# Patient Record
Sex: FEMALE | Race: BLACK OR AFRICAN AMERICAN | Employment: FULL TIME | URBAN - METROPOLITAN AREA
[De-identification: names, ages, dates, MRNs, and addresses within clinical notes are randomized per-mention and may not be internally consistent; named-entity substitution may affect disease eponyms.]

---

## 2019-03-03 ENCOUNTER — HOSPITAL ENCOUNTER (INPATIENT)
Age: 29
LOS: 14 days | Discharge: HOME | DRG: 500 | End: 2019-03-18
Attending: EMERGENCY MEDICINE | Admitting: HOSPITALIST
Payer: SELF-PAY

## 2019-03-03 DIAGNOSIS — G72.9 MYOPATHY: ICD-10-CM

## 2019-03-03 DIAGNOSIS — M62.82 NON-TRAUMATIC RHABDOMYOLYSIS: Primary | ICD-10-CM

## 2019-03-03 LAB
BASOPHILS # BLD: 0 K/UL (ref 0–0.1)
BASOPHILS NFR BLD: 0 % (ref 0–1)
DIFFERENTIAL METHOD BLD: ABNORMAL
EOSINOPHIL # BLD: 0 K/UL (ref 0–0.4)
EOSINOPHIL NFR BLD: 0 % (ref 0–7)
ERYTHROCYTE [DISTWIDTH] IN BLOOD BY AUTOMATED COUNT: 13.1 % (ref 11.5–14.5)
HCT VFR BLD AUTO: 41.4 % (ref 35–47)
HGB BLD-MCNC: 13.8 G/DL (ref 11.5–16)
IMM GRANULOCYTES # BLD AUTO: 0 K/UL (ref 0–0.04)
IMM GRANULOCYTES NFR BLD AUTO: 0 % (ref 0–0.5)
LYMPHOCYTES # BLD: 0.7 K/UL (ref 0.8–3.5)
LYMPHOCYTES NFR BLD: 5 % (ref 12–49)
MCH RBC QN AUTO: 28.3 PG (ref 26–34)
MCHC RBC AUTO-ENTMCNC: 33.3 G/DL (ref 30–36.5)
MCV RBC AUTO: 84.8 FL (ref 80–99)
MONOCYTES # BLD: 0.8 K/UL (ref 0–1)
MONOCYTES NFR BLD: 6 % (ref 5–13)
NEUTS SEG # BLD: 12.1 K/UL (ref 1.8–8)
NEUTS SEG NFR BLD: 89 % (ref 32–75)
NRBC # BLD: 0 K/UL (ref 0–0.01)
NRBC BLD-RTO: 0 PER 100 WBC
PLATELET # BLD AUTO: 256 K/UL (ref 150–400)
PMV BLD AUTO: 9.1 FL (ref 8.9–12.9)
RBC # BLD AUTO: 4.88 M/UL (ref 3.8–5.2)
RBC MORPH BLD: ABNORMAL
WBC # BLD AUTO: 13.6 K/UL (ref 3.6–11)

## 2019-03-03 PROCEDURE — 82550 ASSAY OF CK (CPK): CPT

## 2019-03-03 PROCEDURE — 74011250636 HC RX REV CODE- 250/636: Performed by: EMERGENCY MEDICINE

## 2019-03-03 PROCEDURE — 96361 HYDRATE IV INFUSION ADD-ON: CPT

## 2019-03-03 PROCEDURE — 84100 ASSAY OF PHOSPHORUS: CPT

## 2019-03-03 PROCEDURE — 36415 COLL VENOUS BLD VENIPUNCTURE: CPT

## 2019-03-03 PROCEDURE — 99285 EMERGENCY DEPT VISIT HI MDM: CPT

## 2019-03-03 PROCEDURE — 96360 HYDRATION IV INFUSION INIT: CPT

## 2019-03-03 PROCEDURE — 83735 ASSAY OF MAGNESIUM: CPT

## 2019-03-03 PROCEDURE — 80053 COMPREHEN METABOLIC PANEL: CPT

## 2019-03-03 PROCEDURE — 85025 COMPLETE CBC W/AUTO DIFF WBC: CPT

## 2019-03-03 RX ADMIN — SODIUM CHLORIDE 1000 ML: 900 INJECTION, SOLUTION INTRAVENOUS at 22:58

## 2019-03-03 RX ADMIN — SODIUM CHLORIDE 1000 ML: 900 INJECTION, SOLUTION INTRAVENOUS at 23:41

## 2019-03-04 PROBLEM — E87.6 HYPOKALEMIA: Status: ACTIVE | Noted: 2019-03-04

## 2019-03-04 LAB
ALBUMIN SERPL-MCNC: 3.9 G/DL (ref 3.5–5)
ALBUMIN/GLOB SERPL: 0.9 {RATIO} (ref 1.1–2.2)
ALP SERPL-CCNC: 72 U/L (ref 45–117)
ALT SERPL-CCNC: 111 U/L (ref 12–78)
AMORPH CRY URNS QL MICRO: ABNORMAL
AMPHET UR QL SCN: NEGATIVE
ANION GAP SERPL CALC-SCNC: 13 MMOL/L (ref 5–15)
ANION GAP SERPL CALC-SCNC: 14 MMOL/L (ref 5–15)
ANION GAP SERPL CALC-SCNC: 20 MMOL/L (ref 5–15)
APPEARANCE UR: ABNORMAL
ARTERIAL PATENCY WRIST A: ABNORMAL
AST SERPL-CCNC: 428 U/L (ref 15–37)
BACTERIA URNS QL MICRO: ABNORMAL /HPF
BARBITURATES UR QL SCN: NEGATIVE
BASE DEFICIT BLD-SCNC: 9 MMOL/L
BDY SITE: ABNORMAL
BENZODIAZ UR QL: NEGATIVE
BILIRUB SERPL-MCNC: 0.6 MG/DL (ref 0.2–1)
BILIRUB UR QL CFM: NEGATIVE
BUN SERPL-MCNC: 14 MG/DL (ref 6–20)
BUN SERPL-MCNC: 15 MG/DL (ref 6–20)
BUN SERPL-MCNC: 20 MG/DL (ref 6–20)
BUN/CREAT SERPL: 12 (ref 12–20)
BUN/CREAT SERPL: 14 (ref 12–20)
BUN/CREAT SERPL: 17 (ref 12–20)
CA-I BLD-SCNC: 0.8 MMOL/L (ref 1.12–1.32)
CALCIUM SERPL-MCNC: 6.3 MG/DL (ref 8.5–10.1)
CALCIUM SERPL-MCNC: 6.6 MG/DL (ref 8.5–10.1)
CALCIUM SERPL-MCNC: 8.5 MG/DL (ref 8.5–10.1)
CANNABINOIDS UR QL SCN: NEGATIVE
CHLORIDE SERPL-SCNC: 100 MMOL/L (ref 97–108)
CHLORIDE SERPL-SCNC: 107 MMOL/L (ref 97–108)
CHLORIDE SERPL-SCNC: 110 MMOL/L (ref 97–108)
CK SERPL-CCNC: ABNORMAL U/L (ref 26–192)
CO2 SERPL-SCNC: 10 MMOL/L (ref 21–32)
CO2 SERPL-SCNC: 16 MMOL/L (ref 21–32)
CO2 SERPL-SCNC: 21 MMOL/L (ref 21–32)
COCAINE UR QL SCN: NEGATIVE
COLOR UR: ABNORMAL
COMMENT, HOLDF: NORMAL
CREAT SERPL-MCNC: 1.01 MG/DL (ref 0.55–1.02)
CREAT SERPL-MCNC: 1.15 MG/DL (ref 0.55–1.02)
CREAT SERPL-MCNC: 1.23 MG/DL (ref 0.55–1.02)
DRUG SCRN COMMENT,DRGCM: NORMAL
EPITH CASTS URNS QL MICRO: ABNORMAL /LPF
GAS FLOW.O2 O2 DELIVERY SYS: ABNORMAL L/MIN
GLOBULIN SER CALC-MCNC: 4.4 G/DL (ref 2–4)
GLUCOSE SERPL-MCNC: 104 MG/DL (ref 65–100)
GLUCOSE SERPL-MCNC: 106 MG/DL (ref 65–100)
GLUCOSE SERPL-MCNC: 88 MG/DL (ref 65–100)
GLUCOSE UR STRIP.AUTO-MCNC: 100 MG/DL
HCG UR QL: NEGATIVE
HCO3 BLD-SCNC: 17.9 MMOL/L (ref 22–26)
HGB UR QL STRIP: ABNORMAL
KETONES UR QL STRIP.AUTO: 15 MG/DL
LEUKOCYTE ESTERASE UR QL STRIP.AUTO: ABNORMAL
MAGNESIUM SERPL-MCNC: 2.2 MG/DL (ref 1.6–2.4)
METHADONE UR QL: NEGATIVE
MUCOUS THREADS URNS QL MICRO: ABNORMAL /LPF
NITRITE UR QL STRIP.AUTO: POSITIVE
OPIATES UR QL: NEGATIVE
PCO2 BLD: 37.5 MMHG (ref 35–45)
PCP UR QL: NEGATIVE
PH BLD: 7.29 [PH] (ref 7.35–7.45)
PH UR STRIP: 7 [PH] (ref 5–8)
PHOSPHATE SERPL-MCNC: 4.4 MG/DL (ref 2.6–4.7)
PO2 BLD: 62 MMHG (ref 80–100)
POTASSIUM SERPL-SCNC: 2.7 MMOL/L (ref 3.5–5.1)
POTASSIUM SERPL-SCNC: 2.8 MMOL/L (ref 3.5–5.1)
POTASSIUM SERPL-SCNC: 3.3 MMOL/L (ref 3.5–5.1)
POTASSIUM SERPL-SCNC: 3.9 MMOL/L (ref 3.5–5.1)
PROT SERPL-MCNC: 8.3 G/DL (ref 6.4–8.2)
PROT UR STRIP-MCNC: >300 MG/DL
RBC #/AREA URNS HPF: ABNORMAL /HPF (ref 0–5)
SAMPLES BEING HELD,HOLD: NORMAL
SAO2 % BLD: 88 % (ref 92–97)
SODIUM SERPL-SCNC: 134 MMOL/L (ref 136–145)
SODIUM SERPL-SCNC: 137 MMOL/L (ref 136–145)
SODIUM SERPL-SCNC: 140 MMOL/L (ref 136–145)
SP GR UR REFRACTOMETRY: 1.02 (ref 1–1.03)
SPECIMEN TYPE: ABNORMAL
TSH SERPL DL<=0.05 MIU/L-ACNC: 0.3 UIU/ML (ref 0.36–3.74)
UROBILINOGEN UR QL STRIP.AUTO: 0.2 EU/DL (ref 0.2–1)
WBC URNS QL MICRO: ABNORMAL /HPF (ref 0–4)
YEAST BUDDING URNS QL: PRESENT

## 2019-03-04 PROCEDURE — 74011250636 HC RX REV CODE- 250/636: Performed by: INTERNAL MEDICINE

## 2019-03-04 PROCEDURE — 82550 ASSAY OF CK (CPK): CPT

## 2019-03-04 PROCEDURE — 81001 URINALYSIS AUTO W/SCOPE: CPT

## 2019-03-04 PROCEDURE — 74011000258 HC RX REV CODE- 258: Performed by: HOSPITALIST

## 2019-03-04 PROCEDURE — 80048 BASIC METABOLIC PNL TOTAL CA: CPT

## 2019-03-04 PROCEDURE — 74011250636 HC RX REV CODE- 250/636: Performed by: HOSPITALIST

## 2019-03-04 PROCEDURE — 84443 ASSAY THYROID STIM HORMONE: CPT

## 2019-03-04 PROCEDURE — 65270000029 HC RM PRIVATE

## 2019-03-04 PROCEDURE — 80307 DRUG TEST PRSMV CHEM ANLYZR: CPT

## 2019-03-04 PROCEDURE — 51798 US URINE CAPACITY MEASURE: CPT

## 2019-03-04 PROCEDURE — 96361 HYDRATE IV INFUSION ADD-ON: CPT

## 2019-03-04 PROCEDURE — 81025 URINE PREGNANCY TEST: CPT

## 2019-03-04 PROCEDURE — 77030034850

## 2019-03-04 PROCEDURE — 74011250637 HC RX REV CODE- 250/637: Performed by: EMERGENCY MEDICINE

## 2019-03-04 PROCEDURE — 84132 ASSAY OF SERUM POTASSIUM: CPT

## 2019-03-04 PROCEDURE — 74011000258 HC RX REV CODE- 258: Performed by: NURSE PRACTITIONER

## 2019-03-04 PROCEDURE — 74011250636 HC RX REV CODE- 250/636: Performed by: NURSE PRACTITIONER

## 2019-03-04 PROCEDURE — 74011000250 HC RX REV CODE- 250: Performed by: HOSPITALIST

## 2019-03-04 PROCEDURE — 82803 BLOOD GASES ANY COMBINATION: CPT

## 2019-03-04 PROCEDURE — 36415 COLL VENOUS BLD VENIPUNCTURE: CPT

## 2019-03-04 RX ORDER — SODIUM CHLORIDE 9 MG/ML
100 INJECTION, SOLUTION INTRAVENOUS CONTINUOUS
Status: DISCONTINUED | OUTPATIENT
Start: 2019-03-04 | End: 2019-03-05

## 2019-03-04 RX ORDER — SODIUM CHLORIDE 0.9 % (FLUSH) 0.9 %
5-40 SYRINGE (ML) INJECTION EVERY 8 HOURS
Status: DISCONTINUED | OUTPATIENT
Start: 2019-03-04 | End: 2019-03-18 | Stop reason: HOSPADM

## 2019-03-04 RX ORDER — ACETAMINOPHEN 500 MG
500 TABLET ORAL
Status: DISCONTINUED | OUTPATIENT
Start: 2019-03-04 | End: 2019-03-18 | Stop reason: HOSPADM

## 2019-03-04 RX ORDER — POTASSIUM CHLORIDE 1.5 G/1.77G
40 POWDER, FOR SOLUTION ORAL
Status: COMPLETED | OUTPATIENT
Start: 2019-03-04 | End: 2019-03-04

## 2019-03-04 RX ORDER — ENOXAPARIN SODIUM 100 MG/ML
40 INJECTION SUBCUTANEOUS EVERY 24 HOURS
Status: DISCONTINUED | OUTPATIENT
Start: 2019-03-04 | End: 2019-03-10

## 2019-03-04 RX ORDER — POTASSIUM CHLORIDE 7.45 MG/ML
10 INJECTION INTRAVENOUS
Status: COMPLETED | OUTPATIENT
Start: 2019-03-04 | End: 2019-03-04

## 2019-03-04 RX ORDER — OXYCODONE HYDROCHLORIDE 5 MG/1
5 TABLET ORAL
Status: DISCONTINUED | OUTPATIENT
Start: 2019-03-04 | End: 2019-03-13

## 2019-03-04 RX ORDER — SODIUM CHLORIDE 0.9 % (FLUSH) 0.9 %
5-40 SYRINGE (ML) INJECTION AS NEEDED
Status: DISCONTINUED | OUTPATIENT
Start: 2019-03-04 | End: 2019-03-18 | Stop reason: HOSPADM

## 2019-03-04 RX ORDER — ONDANSETRON 2 MG/ML
4 INJECTION INTRAMUSCULAR; INTRAVENOUS ONCE
Status: COMPLETED | OUTPATIENT
Start: 2019-03-04 | End: 2019-03-04

## 2019-03-04 RX ORDER — MORPHINE SULFATE 2 MG/ML
2 INJECTION, SOLUTION INTRAMUSCULAR; INTRAVENOUS
Status: DISCONTINUED | OUTPATIENT
Start: 2019-03-04 | End: 2019-03-05

## 2019-03-04 RX ORDER — ONDANSETRON 2 MG/ML
4 INJECTION INTRAMUSCULAR; INTRAVENOUS
Status: DISCONTINUED | OUTPATIENT
Start: 2019-03-04 | End: 2019-03-18 | Stop reason: HOSPADM

## 2019-03-04 RX ORDER — POTASSIUM CHLORIDE 1.5 G/1.77G
40 POWDER, FOR SOLUTION ORAL
Status: DISCONTINUED | OUTPATIENT
Start: 2019-03-04 | End: 2019-03-04 | Stop reason: SDUPTHER

## 2019-03-04 RX ORDER — POTASSIUM CHLORIDE 750 MG/1
40 TABLET, FILM COATED, EXTENDED RELEASE ORAL
Status: DISCONTINUED | OUTPATIENT
Start: 2019-03-04 | End: 2019-03-04 | Stop reason: ALTCHOICE

## 2019-03-04 RX ADMIN — ONDANSETRON HYDROCHLORIDE 4 MG: 2 INJECTION INTRAMUSCULAR; INTRAVENOUS at 20:41

## 2019-03-04 RX ADMIN — MORPHINE SULFATE 2 MG: 2 INJECTION, SOLUTION INTRAMUSCULAR; INTRAVENOUS at 09:18

## 2019-03-04 RX ADMIN — POTASSIUM CHLORIDE 10 MEQ: 10 INJECTION, SOLUTION INTRAVENOUS at 02:31

## 2019-03-04 RX ADMIN — Medication 10 ML: at 06:33

## 2019-03-04 RX ADMIN — POTASSIUM CHLORIDE 10 MEQ: 10 INJECTION, SOLUTION INTRAVENOUS at 06:33

## 2019-03-04 RX ADMIN — MORPHINE SULFATE 2 MG: 2 INJECTION, SOLUTION INTRAMUSCULAR; INTRAVENOUS at 14:01

## 2019-03-04 RX ADMIN — CALCIUM GLUCONATE 2 G: 98 INJECTION, SOLUTION INTRAVENOUS at 16:25

## 2019-03-04 RX ADMIN — ENOXAPARIN SODIUM 40 MG: 100 INJECTION SUBCUTANEOUS at 10:00

## 2019-03-04 RX ADMIN — MORPHINE SULFATE 2 MG: 2 INJECTION, SOLUTION INTRAMUSCULAR; INTRAVENOUS at 02:31

## 2019-03-04 RX ADMIN — CALCIUM GLUCONATE 2 G: 98 INJECTION, SOLUTION INTRAVENOUS at 19:47

## 2019-03-04 RX ADMIN — SODIUM CHLORIDE 200 ML/HR: 900 INJECTION, SOLUTION INTRAVENOUS at 02:31

## 2019-03-04 RX ADMIN — SODIUM BICARBONATE: 84 INJECTION, SOLUTION INTRAVENOUS at 21:52

## 2019-03-04 RX ADMIN — ONDANSETRON 4 MG: 2 INJECTION INTRAMUSCULAR; INTRAVENOUS at 14:01

## 2019-03-04 RX ADMIN — POTASSIUM CHLORIDE 10 MEQ: 10 INJECTION, SOLUTION INTRAVENOUS at 03:44

## 2019-03-04 RX ADMIN — Medication 10 ML: at 14:01

## 2019-03-04 RX ADMIN — MORPHINE SULFATE 2 MG: 2 INJECTION, SOLUTION INTRAMUSCULAR; INTRAVENOUS at 22:31

## 2019-03-04 RX ADMIN — POTASSIUM CHLORIDE 10 MEQ: 10 INJECTION, SOLUTION INTRAVENOUS at 05:20

## 2019-03-04 RX ADMIN — POTASSIUM CHLORIDE 40 MEQ: 1.5 POWDER, FOR SOLUTION ORAL at 01:09

## 2019-03-04 RX ADMIN — MORPHINE SULFATE 2 MG: 2 INJECTION, SOLUTION INTRAMUSCULAR; INTRAVENOUS at 18:23

## 2019-03-04 NOTE — PROGRESS NOTES
Primary Nurse Lenny Eason RN and Alejandro Shin RN performed a dual skin assessment on this patient No impairment noted  Jacky score is 17      0920:  Patient arrived on the unit. 4 assist from stretcher to bed. Assessment and orientation to unit  Patient requested IV morphine - administered to the patient. 0945:  Patient complained of purewick \"not working\". Had patient explain further what she meant. She stated that \"it's just not working. They always give me a catheter every time I come in for this. Why don't they do it anymore? \"  Explained to her that it a catheter increases her risk for a UTI and we would like to avoid that. Educated her on the straight cath protocol. Patient was bladder scanned for 464 and straight cathed for 500.   1000:  Upon reassessment of pain, patient is snoring in bed. [place] the patient who stated her pain is now 7/10, decreased from 8/10  Call received from lab regarding patient's CPK level of 132,780. MD notified. No new orders given. 1115: Mother at bedside. Patient declined turning  1230:  Patient vomited, cleaned by PCT, turned on right side  1400:  Administered PRN medications per request.  Patient stated that her bladd has been feeling \"full\" for 2 hours now, bladder scanned patient for 275ml. Offered bedpan. 1515:  Patient began complaining that we need to straight cath her and that she was feeling \"really uncomfortable\". Patient straight cathed for 400.   Repositioned to left side

## 2019-03-04 NOTE — ROUTINE PROCESS
TRANSFER - OUT REPORT:    Verbal report given to LUIS Ambrocio(name) on [address]  being transferred to 546(unit) for routine progression of care       Report consisted of patients Situation, Background, Assessment and   Recommendations(SBAR). Information from the following report(s) SBAR, ED Summary, [facility] and Recent Results was reviewed with the receiving nurse. Lines:   Peripheral IV 03/03/19 Left Antecubital (Active)   Site Assessment Clean, dry, & intact 3/3/2019 10:59 PM   Phlebitis Assessment 0 3/3/2019 10:59 PM   Infiltration Assessment 0 3/3/2019 10:59 PM   Dressing Status Clean, dry, & intact 3/3/2019 10:59 PM   Dressing Type Transparent 3/3/2019 10:59 PM   Hub Color/Line Status Green; Infusing;Flushed;Patent 3/3/2019 10:59 PM   Action Taken Blood drawn 3/3/2019 10:59 PM        Opportunity for questions and clarification was provided.       Patient transported with:    Jennifer Ayoub RN

## 2019-03-04 NOTE — PROGRESS NOTES
TRANSFER - IN REPORT:    Verbal report received from ED RN (name) on [address]  being received from ED (unit) for routine progression of care      Report consisted of patients Situation, Background, Assessment and   Recommendations(SBAR). Information from the following report(s) SBAR, Kardex, ED Summary and MAR was reviewed with the receiving nurse. Opportunity for questions and clarification was provided. Assessment completed upon patients arrival to unit and care assumed.

## 2019-03-04 NOTE — PROGRESS NOTES
Hospitalist Progress Note  Melissa Arredondo NP  Answering service: [phone] from in house phone  Cell: [phone]   Date of Service:  3/4/2019  NAME:  Kishore Srinivasan  :  1990  MRN:  [id number]    Admission Summary:   Pt presented to the ED with generalized body aches and soreness with any movement. She has a hx of rhabdomyolysis x 2 other times in her life. Reported a mild cold but denied ay strenuous activity or heavy exertion. She hasn't started any new prescription meds, but started taking a weight loss pill called \"thermofight-x\" ~ 1 week PTA. She also reported she noticed her urine had turned a dark brown color. Due to her hx of rhabdo, she noted these symptoms and came to the ED with expectation of the diagnosis. Interval history / Subjective:   Pt is in bed, reports she feels aching and muscle pain all over. In interview, she says she feels weak but no numbness, tingling or paresthesias. Does not want to move due to her pain. Assessment & Plan:     Rhabdomyolysis:  - etiology is uncertain. - significant hypokalemia which could precipitate rhabdo. She also started taking a weight-loss pill called Thermofight-X which contains multiple ingredients including chromium and a \"proprietery blend. \"   - UDS negative  - CK 79,380 on admission and now bumped up to 132,780  - continue high rate of IVNS  - replete potassium, monitor other lytes  - trend CK  - pain control with morphine  - Check TSH, JAMARCUS  - will need EMG outpatient, briefly discussed with neurologist  - pt has declined an in-patient muscle biopsy     Transaminitis:   - elevated AST likely due to muscle breakdown, ALT elevation probably due to mild liver damage from rhabdo  - monitor LFTs with fluids on board     Hypokalemia: replete and recheck labs this afternoon    Hypocalcemia: check an ionized calcium    Code status:Full  DVT prophylaxis: Lovenox  Care Plan discussed with: patient, attending MD  Disposition: home when able     Hospital Problems  Date Reviewed: 5/12/2016          Codes Class Noted POA    Hypokalemia ICD-10-CM: E87.6  ICD-9-CM: 276.8  3/4/2019 Yes        * (Principal) Rhabdomyolysis ICD-10-CM: I67.10  ICD-9-CM: 728.88  5/4/2016 Yes            Review of Systems:   Denies HA. No chest pain or pressure. No respiratory or GI complaints. +++generalized muscle soreness and discomfort to all muscles. Vital Signs:    Last 24hrs VS reviewed since prior progress note. Most recent are:  Visit Vitals  /72 (BP 1 Location: Right arm, BP Patient Position: Post activity)   Pulse 76   Temp 98.6 °F (37 °C)   Resp 16   Ht 5' 3\" (1.6 m)   Wt 109 kg (240 lb 4.8 oz)   SpO2 97%   BMI 42.57 kg/m²       Intake/Output Summary (Last 24 hours) at 3/4/2019 1312  Last data filed at 3/4/2019 7832  Gross per 24 hour   Intake --   Output 500 ml   Net -500 ml      Physical Examination:         Constitutional:  Cooperative, pleasant. Looks uncomfortable    ENT:  Oral MM moist, oropharynx benign. Resp:  CTA bilaterally. No wheezing. No accessory muscle use and on RA. CV:  Regular rhythm, normal rate. No murmurs. GI:  Obese. Soft, non distended, non tender. Normoactive bowel sounds. Musculoskeletal:  No edema, warm, 2+ pulses throughout. Neurologic:  Moves all extremities but slowly and with pain. AAOx3. Sensation is intact. Data Review:   Labs Reviewed. Labs:     Recent Labs     03/03/19  2300   WBC 13.6*   HGB 13.8   HCT 41.4        Recent Labs     03/04/19  0741 03/03/19  2300    134*   K 2.8* 2.7*    100   CO2 16* 21   BUN 14 15   CREA 1.01 1.23*   * 106*   CA 6.6* 8.5   MG  --  2.2   PHOS  --  4.4     Recent Labs     03/03/19  2300   SGOT 428*   *   AP 72   TBILI 0.6   TP 8.3*   ALB 3.9   GLOB 4.4*     No results for input(s): INR, PTP, APTT in the last 72 hours.     No lab exists for component: INREXT   No results for input(s): FE, TIBC, PSAT, FERR in the last 72 hours. No results found for: FOL, RBCF   No results for input(s): PH, PCO2, PO2 in the last 72 hours.   Recent Labs     03/04/19  0741 03/03/19  2300   ,780* 79,380*     No results found for: CHOL, CHOLX, CHLST, CHOLV, HDL, LDL, LDLC, DLDLP, TGLX, TRIGL, TRIGP, CHHD, CHHDX  Lab Results   Component Value Date/Time    Glucose (POC) 92 04/28/2013 11:56 AM     Lab Results   Component Value Date/Time    Color RED 03/04/2019 12:14 AM    Appearance BLOODY (A) 03/04/2019 12:14 AM    Specific gravity 1.025 03/04/2019 12:14 AM    Specific gravity 1.025 05/26/2016 07:42 PM    pH (UA) 7.0 03/04/2019 12:14 AM    Protein >300 (A) 03/04/2019 12:14 AM    Glucose 100 (A) 03/04/2019 12:14 AM    Ketone 15 (A) 03/04/2019 12:14 AM    Bilirubin NEGATIVE  05/26/2016 07:42 PM    Urobilinogen 0.2 03/04/2019 12:14 AM    Nitrites POSITIVE (A) 03/04/2019 12:14 AM    Leukocyte Esterase MODERATE (A) 03/04/2019 12:14 AM    Epithelial cells FEW 03/04/2019 12:14 AM    Bacteria 1+ (A) 03/04/2019 12:14 AM    WBC 5-10 03/04/2019 12:14 AM    RBC  03/04/2019 12:14 AM     Medications Reviewed:     Current Facility-Administered Medications   Medication Dose Route Frequency    sodium chloride (NS) flush 5-40 mL  5-40 mL IntraVENous Q8H    sodium chloride (NS) flush 5-40 mL  5-40 mL IntraVENous PRN    morphine injection 2 mg  2 mg IntraVENous Q4H PRN    enoxaparin (LOVENOX) injection 40 mg  40 mg SubCUTAneous Q24H    0.9% sodium chloride infusion  200 mL/hr IntraVENous CONTINUOUS    oxyCODONE IR (ROXICODONE) tablet 5 mg  5 mg Oral Q4H PRN    acetaminophen (TYLENOL) tablet 500 mg  500 mg Oral Q6H PRN    ondansetron (ZOFRAN) injection 4 mg  4 mg IntraVENous ONCE   ______________________________________________________________________  EXPECTED LENGTH OF STAY: 3d 4h  ACTUAL LENGTH OF STAY:          0               Conchis Flynn NP

## 2019-03-04 NOTE — PROGRESS NOTES
Care Management Interventions  PCP Verified by CM: No(does not have a PCP, given a list of free clinics)  Palliative Care Criteria Met (RRAT>21 & CHF Dx)?: No  MyChart Signup: No  Discharge Durable Medical Equipment: No  Health Maintenance Reviewed: Yes  Physical Therapy Consult: No  Occupational Therapy Consult: No  Speech Therapy Consult: No  Current Support Network: Own Home  Confirm Follow Up Transport: Family  Plan discussed with Pt/Family/Caregiver: Yes  Freedom of Choice Offered: Yes  [place] Resource Information Provided?: No  Discharge Location  Discharge Placement: Home with family assistance    Reason for Admission:   Generalized body aches                   RRAT Score:     4                Plan for utilizing home health:    Most likely will not need home health                     Likelihood of Readmission:  low                         Transition of Care Plan:    Chart reviewed for transitions of care, met with patient at the bedside. Patient is alert and oriented but not up for conversation. She did confirm that she does not have insurance and does not have  PCP. Cm asked patient if she worked full time and if they offered insurance. She stated that they do, but it is too expensive. Cm will provide her with information on the [facility] in case she is interested in establishing a PCP there.   Dillon Bliss BSW, ACM

## 2019-03-04 NOTE — PROGRESS NOTES
[address] 34 March 5, 2019       RE: Rafael Hankins      To Whom It May Concern,    This is to certify that Rafael Hankins is currently hospitalized (as of 3/4/2018) and her , Lukas Bernal has been present at her bedside 3/4-present.      Sincerely,  Vianey Garcia, NP

## 2019-03-04 NOTE — CONSULTS
[id number] [facility] Surgery at [address] [facility] Consultation    Admit Date: 3/3/2019  Reason for Consultation: Muscle biopsy for familial rhabdomyolysis    HPI:  Stefan Qureshi is a 34 y.o. female whom we are asked to see in consultation by for muscle biopsy. She is currently admitted rhabdomyolysis. Symptoms began yesterday with a \"mild cold\" and she subsequently developed body aches. States this is the third time she has gotten rhabdomyolysis. She reports history of rhabdomyolysis in both her mother and father. She wants to follow-up in the office to have muscle biopsy done as an outpatient. Does not want procedure done this admission. Patient Active Problem List    Diagnosis Date Noted    Hypokalemia 03/04/2019    Traumatic rhabdomyolysis ([facility] [address] 05/12/2016    Rhabdomyolysis 05/04/2016     Past Medical History:   Diagnosis Date    Other ill-defined conditions(799.89)     rhabdo    Rhabdomyolysis     Traumatic rhabdomyolysis ([facility] [address] 5/12/2016      History reviewed. No pertinent surgical history. Social History     Tobacco Use    Smoking status: Current Some Day Smoker    Smokeless tobacco: Never Used   Substance Use Topics    Alcohol use: No     Comment: rare      Family History   Problem Relation Age of Onset    Diabetes Father     Diabetes Maternal Grandmother     Diabetes Maternal Grandfather       Prior to Admission medications    Medication Sig Start Date End Date Taking?  Authorizing Provider   OTHER Thermofight X, weight loss supplement   Yes Provider, Historical     Current Facility-Administered Medications   Medication Dose Route Frequency    sodium chloride (NS) flush 5-40 mL  5-40 mL IntraVENous Q8H    sodium chloride (NS) flush 5-40 mL  5-40 mL IntraVENous PRN    morphine injection 2 mg  2 mg IntraVENous Q4H PRN    enoxaparin (LOVENOX) injection 40 mg  40 mg SubCUTAneous Q24H    0.9% sodium chloride infusion  200 mL/hr IntraVENous CONTINUOUS    oxyCODONE IR (ROXICODONE) tablet 5 mg  5 mg Oral Q4H PRN    acetaminophen (TYLENOL) tablet 500 mg  500 mg Oral Q6H PRN     Allergies   Allergen Reactions    Compazine [Prochlorperazine Edisylate] Anaphylaxis     stroke    Midol [Acetaminophen-Pamabrom] Anaphylaxis     dont take again because of rabdo    Zofran [Ondansetron Hcl (Pf)] Nausea and Vomiting    Aspirin Other (comments)     Never take again because of rabdo    Compazine [Prochlorperazine Edisylate] Other (comments)     Stroke symptoms    Contrast Agent [Iodine] Other (comments)     Kidney problems    Ibuprofen Other (comments)     Kidney problem,rhabdo      Motrin [Ibuprofen] Nausea and Vomiting    Zofran [Ondansetron Hcl (Pf)] Nausea and Vomiting          Subjective:     Review of Systems:    A comprehensive review of systems was negative except for that written in the History of Present Illness. Objective:     Blood pressure 124/72, pulse 76, temperature 98.6 °F (37 °C), resp. rate 16, height 5' 3\" (1.6 m), weight 109 kg (240 lb 4.8 oz), SpO2 97 %. Temp (24hrs), Av °F (37.2 °C), Min:98.6 °F (37 °C), Max:100.1 °F (37.8 °C)      Recent Labs     19  2300   WBC 13.6*   HGB 13.8   HCT 41.4        Recent Labs     19  0741 19  2300    134*   K 2.8* 2.7*    100   CO2 16* 21   * 106*   BUN 14 15   CREA 1.01 1.23*   CA 6.6* 8.5   MG  --  2.2   PHOS  --  4.4   ALB  --  3.9   TBILI  --  0.6   SGOT  --  428*   ALT  --  111*     No results for input(s): AML, LPSE in the last 72 hours.       Intake/Output Summary (Last 24 hours) at 3/4/2019 1055  Last data filed at 3/4/2019 0952  Gross per 24 hour   Intake --   Output 500 ml   Net -500 ml     Date 19 0700 - 19 0659   Shift 8247-7962 1247-1075 2077-4196 24 Hour Total   INTAKE   Shift Total(mL/kg)       OUTPUT   Urine(mL/kg/hr) 500   500   Shift Total(mL/kg) 500(4.6)   500(4.6)   Weight (kg) 109 109 109 109     _____________________  Physical Exam: General:  Alert, cooperative, no distress, appears stated age. Eyes:   PERRL, EOMs intact. Sclera clear. Throat: Lips, mucosa, and tongue normal.   Neck: Supple, symmetrical, trachea midline. Lungs:   Clear to auscultation bilaterally. Heart:  Regular rate and rhythm. Abdomen:   Normal BS, obese, Soft, non-tender. No masses,  No organomegaly. Extremities: Extremities normal, atraumatic, no cyanosis or edema. Skin: Skin color, texture, turgor normal. No rashes or lesions. Assessment:   Principal Problem:    Rhabdomyolysis (5/4/2016)    Active Problems:    Hypokalemia (3/4/2019)            Plan:     Appointment scheduled for 1:20pm on Tuesday, March 26, [address]. Thank you for allowing us to participate in the care of this patient. Total time spent with patient: 13 minutes.     Signed By: Alka Munguia NP     March 4, 2019

## 2019-03-04 NOTE — PROGRESS NOTES
Admission Medication Reconciliation:    Information obtained from:  patient, chart review, insurance claim information    Comments/Recommendations: All medications/allergies have been reviewed and updated; the patient reports that she is not taking any prescribed medications, but is currently taking a weight loss supplement called Therofight X. Changes made to Prior to Admission (PTA) Medication List:   ?   Medications Added:   - weight loss supplement  ? Medications Changed:   - None   ? Medications Removed:   - None       Significant PMH/Disease States:   Past Medical History:   Diagnosis Date    Other ill-defined conditions(799.89)     rhabdo    Rhabdomyolysis     Traumatic rhabdomyolysis ([facility] [address] 5/12/2016       Chief Complaint for this Admission:    Chief Complaint   Patient presents with    Generalized Body Aches       Allergies:  Compazine [prochlorperazine edisylate]; Midol [acetaminophen-pamabrom]; Zofran [ondansetron hcl (pf)]; Aspirin; Compazine [prochlorperazine edisylate]; Contrast agent [iodine]; Ibuprofen; Motrin [ibuprofen]; and Zofran [ondansetron hcl (pf)]    Prior to Admission Medications:   Prior to Admission Medications   Prescriptions Last Dose Informant Patient Reported? Taking? OTHER   Yes Yes   Sig: Thermofight X, weight loss supplement      Facility-Administered Medications: None       Thank you for allowing pharmacy to participate in the coordination of this patient's care. If you have any other questions, please contact the medication reconciliation pharmacist at x [phone]. Leandro Moreira, Pharm. D., BCPS

## 2019-03-04 NOTE — ED PROVIDER NOTES
34 y.o. female with past medical history significant for Rhabdomyolysis who presents from home via private vehicle with chief complaint of generalized body aches. Pt reports onset earlier today of generalized body aches accompanied by chills, nausea, vomiting, sore throat, and cough. Pt also notes onset \"2 hours ago\" of dark urine. Pt reports history of rhabdomyolysis and states, \"I had it once when I was 22 and again in 2015\". Pt reports current symptoms \"feel like Rhabdo\". Pt reports she works at "[employer]", so it is likely she may have recent ill contacts. Pt denies any fever or bowel symptoms. There are no other acute medical concerns at this time. Old Chart Review:  Pt was last seen in ED on 5/26/2016 for muscle aches. Pt's CK level was WNL. Pt was given IV fluids, discharged, and recommended to follow up with PCP as needed. Pt was last admitted for rhabdomyolysis from 5/4/2016 to 5/8/2016. Pt was treated with IV fluids. Upon admission, pt's CK was approximately 91233. According to progress note from PCP, dated 5/12/16, pt has family history of rhabdomyolosis; both her mother and grandfather experienced 1 episode each of rhabdomyolysis. Social hx: Current smoker; No EtOH use    Note written by Marisol Wong, as dictated by Vivian Perdue MD 10:06 PM        The history is provided by the patient and medical records. No  was used. Past Medical History:   Diagnosis Date    Other ill-defined conditions(799.89)     rhabdo    Rhabdomyolysis     Traumatic rhabdom[facility]olysis ([facility] [address] 5/12/2016       No past surgical history on file.       Family History:   Problem Relation Age of Onset    Diabetes Father     Diabetes Maternal Grandmother     Diabetes Maternal Grandfather        Social History     Socioeconomic History    Marital status: SINGLE     Spouse name: Not on file    Number of children: Not on file    Years of education: Not on file    Highest education level: Not on file   Social Needs    Financial resource strain: Not on file    Food insecurity - worry: Not on file    Food insecurity - inability: Not on file   [employer] needs - medical: Not on file   [employer] needs - non-medical: Not on file   Occupational History    Not on file   Tobacco Use    Smoking status: Current Some Day Smoker   Substance and Sexual Activity    Alcohol use: No     Comment: rare    Drug use: No    Sexual activity: Yes     Partners: Male     Birth control/protection: None   Other Topics Concern    Not on file   Social History Narrative    ** Merged History Encounter **              ALLERGIES: Compazine [prochlorperazine edisylate]; Midol [acetaminophen-pamabrom]; Zofran [ondansetron hcl (pf)]; Aspirin; Compazine [prochlorperazine edisylate]; Contrast agent [iodine]; Ibuprofen; Motrin [ibuprofen]; and Zofran [ondansetron hcl (pf)]    Review of Systems   Constitutional: Positive for chills. Negative for fever. HENT: Positive for sore throat. Negative for rhinorrhea. Respiratory: Positive for cough. Negative for shortness of breath. Cardiovascular: Negative for chest pain. Gastrointestinal: Positive for nausea and vomiting. Negative for abdominal pain and diarrhea. Genitourinary: Negative for dysuria and urgency. Musculoskeletal: Positive for myalgias. Negative for arthralgias and back pain. Skin: Negative for rash. Neurological: Negative for dizziness, weakness and light-headedness. All other systems reviewed and are negative. Vitals:    03/03/19 2142 03/03/19 2159   BP:  141/82   Pulse: (!) 110 (!) 111   Resp: 18 18   Temp:  100.1 °F (37.8 °C)   SpO2: 99% 94%   Weight:  109 kg (240 lb 4.8 oz)            Physical Exam   Vital signs reviewed. Nursing notes reviewed.     Const:  No acute distress, well developed, well nourished  Head:  Atraumatic, normocephalic  Eyes:  PERRL, conjunctiva normal, no scleral icterus  Neck:  Supple, trachea midline  Cardiovascular:  Pt is tachycardic, no murmurs, no gallops, no rubs  Resp:  No resp distress, no increased work of breathing, no wheezes, no rhonchi, no rales,  Abd:  Soft, non-tender, non-distended, no rebound, no guarding, no CVA tenderness  :  Deferred  MSK:  No pedal edema, normal ROM  Neuro:  Alert and oriented x3, no cranial nerve defect  Skin:  Warm, dry, intact  Psych: normal mood and affect, behavior is normal, judgement and thought content is normal    Note written by Marisol Quezada, as dictated by Amando Mustafa MD 10:06 PM    MDM  Number of Diagnoses or Management Options  Non-traumatic rhabdomyolysis:      Amount and/or Complexity of Data Reviewed  Clinical lab tests: ordered and reviewed  Tests in the radiology section of CPT®: ordered and reviewed  Review and summarize past medical records: yes    Patient Progress  Patient progress: stable         Procedures  PROGRESS NOTE:  10:56 PM  Pt is refusing flu test.    Pt. Presents to the ER with rhabdo. Pt. Has had several episodes of rhabdo in the past.  No obvious precipitating events  I have started her on IVF. CK is pending, but I suspect that it will be high. Pt. Signed out to Dr. Marvel Fraga.

## 2019-03-04 NOTE — PROGRESS NOTES
Bedside shift change report given to Nisreen Schwarz (oncoming nurse) by Sudheer Roberts (offgoing nurse). Report included the following information SBAR, Kardex, ED Summary, Intake/Output and MAR.

## 2019-03-04 NOTE — H&P
HISTORY AND PHYSICAL      PCP: None  History source: the patient      CC: body aches      HPI: 34 y.o lady with a history of rhabdomyolysis who presents with generalized body aches. Symptoms began today. Her entire body is sore and hurts with any movement. She denies feeling ill recently. She denies ay strenuous activity or heavy exertion recently. She hasn't started any new prescription meds, but started taking a weight loss pill called \"thermofight-x. \" She denies fever, dyspnea, n/v. She noticed her urine had turned a dark brown color. PMH/PSH:  Past Medical History:   Diagnosis Date    Other ill-defined conditions(799.89)     rhabdo    Rhabdomyolysis     Traumatic rhabdomyolysis ([facility] [address] 5/12/2016     History reviewed. No pertinent surgical history. Home meds:   Prior to Admission medications    Not on File   Takes a supplement called Thermofight-X for the past week    Allergies: Allergies   Allergen Reactions    Compazine [Prochlorperazine Edisylate] Anaphylaxis     stroke    Midol [Acetaminophen-Pamabrom] Anaphylaxis     dont take again because of rabdo    Zofran [Ondansetron Hcl (Pf)] Nausea and Vomiting    Aspirin Other (comments)     Never take again because of rabdo    Compazine [Prochlorperazine Edisylate] Other (comments)     Stroke symptoms    Contrast Agent [Iodine] Other (comments)     Kidney problems    Ibuprofen Other (comments)     Kidney problem,rhabdo      Motrin [Ibuprofen] Nausea and Vomiting    Zofran [Ondansetron Hcl (Pf)] Nausea and Vomiting       FH:  Family History   Problem Relation Age of Onset    Diabetes Father     Diabetes Maternal Grandmother     Diabetes Maternal Grandfather        SH:  Social History     Tobacco Use    Smoking status: Current Some Day Smoker    Smokeless tobacco: Never Used   Substance Use Topics    Alcohol use: No     Comment: rare       ROS: A comprehensive review of systems was negative except for that written in the HPI.       PHYSICAL EXAM:  Visit Vitals  /82   Pulse (!) 111   Temp 100.1 °F (37.8 °C)   Resp 18   Ht 5' 3\" (1.6 m)   Wt 109 kg (240 lb 4.8 oz)   SpO2 94%   BMI 42.57 kg/m²       Gen: NAD, non-toxic  HEENT: anicteric sclerae, normal conjunctiva, oropharynx clear, MM moist  Neck: supple, trachea midline, no adenopathy  Heart: RRR, no MRG, no JVD, no peripheral edema  Lungs: CTA b/l, non-labored respirations  Abd: soft, NT, ND, BS+, no organomegaly  Extr: warm, diffusely tender  Skin: dry, no rash  Neuro: CN II-XII grossly intact, normal speech, moves all extremities  Psych: flat affect      Labs/Imaging:  Recent Results (from the past 24 hour(s))   CK    Collection Time: 03/03/19 11:00 PM   Result Value Ref Range    CK 79,380 (HH) 26 - 366 U/L   METABOLIC PANEL, COMPREHENSIVE    Collection Time: 03/03/19 11:00 PM   Result Value Ref Range    Sodium 134 (L) 136 - 145 mmol/L    Potassium 2.7 (LL) 3.5 - 5.1 mmol/L    Chloride 100 97 - 108 mmol/L    CO2 21 21 - 32 mmol/L    Anion gap 13 5 - 15 mmol/L    Glucose 106 (H) 65 - 100 mg/dL    BUN 15 6 - 20 MG/DL    Creatinine 1.23 (H) 0.55 - 1.02 MG/DL    BUN/Creatinine ratio 12 12 - 20      GFR est AA >60 >60 ml/min/1.73m2    GFR est non-AA 52 (L) >60 ml/min/1.73m2    Calcium 8.5 8.5 - 10.1 MG/DL    Bilirubin, total 0.6 0.2 - 1.0 MG/DL    ALT (SGPT) 111 (H) 12 - 78 U/L    AST (SGOT) 428 (H) 15 - 37 U/L    Alk.  phosphatase 72 45 - 117 U/L    Protein, total 8.3 (H) 6.4 - 8.2 g/dL    Albumin 3.9 3.5 - 5.0 g/dL    Globulin 4.4 (H) 2.0 - 4.0 g/dL    A-G Ratio 0.9 (L) 1.1 - 2.2     CBC WITH AUTOMATED DIFF    Collection Time: 03/03/19 11:00 PM   Result Value Ref Range    WBC 13.6 (H) 3.6 - 11.0 K/uL    RBC 4.88 3.80 - 5.20 M/uL    HGB 13.8 11.5 - 16.0 g/dL    HCT 41.4 35.0 - 47.0 %    MCV 84.8 80.0 - 99.0 FL    MCH 28.3 26.0 - 34.0 PG    MCHC 33.3 30.0 - 36.5 g/dL    RDW 13.1 11.5 - 14.5 %    PLATELET 052 991 - 574 K/uL    MPV 9.1 8.9 - 12.9 FL    NRBC 0.0 0  WBC    ABSOLUTE NRBC 0.00 0.00 - 0.01 K/uL    NEUTROPHILS 89 (H) 32 - 75 %    LYMPHOCYTES 5 (L) 12 - 49 %    MONOCYTES 6 5 - 13 %    EOSINOPHILS 0 0 - 7 %    BASOPHILS 0 0 - 1 %    IMMATURE GRANULOCYTES 0 0.0 - 0.5 %    ABS. NEUTROPHILS 12.1 (H) 1.8 - 8.0 K/UL    ABS. LYMPHOCYTES 0.7 (L) 0.8 - 3.5 K/UL    ABS. MONOCYTES 0.8 0.0 - 1.0 K/UL    ABS. EOSINOPHILS 0.0 0.0 - 0.4 K/UL    ABS. BASOPHILS 0.0 0.0 - 0.1 K/UL    ABS. IMM. GRANS. 0.0 0.00 - 0.04 K/UL    DF SMEAR SCANNED      RBC COMMENTS OVALOCYTES  PRESENT       SAMPLES BEING HELD    Collection Time: 03/03/19 11:00 PM   Result Value Ref Range    SAMPLES BEING HELD 1RED,1BLUE, HOLD     COMMENT        Add-on orders for these samples will be processed based on acceptable specimen integrity and analyte stability, which may vary by analyte.    MAGNESIUM    Collection Time: 03/03/19 11:00 PM   Result Value Ref Range    Magnesium 2.2 1.6 - 2.4 mg/dL   URINALYSIS W/MICROSCOPIC    Collection Time: 03/04/19 12:14 AM   Result Value Ref Range    Color RED      Appearance BLOODY (A) CLEAR      Specific gravity 1.025 1.003 - 1.030      pH (UA) 7.0 5.0 - 8.0      Protein >300 (A) NEG mg/dL    Glucose 100 (A) NEG mg/dL    Ketone 15 (A) NEG mg/dL    Blood LARGE (A) NEG      Urobilinogen 0.2 0.2 - 1.0 EU/dL    Nitrites POSITIVE (A) NEG      Leukocyte Esterase MODERATE (A) NEG      Bilirubin UA, confirm NEGATIVE  NEG      WBC 5-10 0 - 4 /hpf    RBC  0 - 5 /hpf    Epithelial cells FEW FEW /lpf    Bacteria 1+ (A) NEG /hpf    Mucus TRACE (A) NEG /lpf    Amorphous Crystals 2+ (A) NEG    Budding yeast PRESENT (A) NEG     HCG URINE, QL. - POC    Collection Time: 03/04/19 12:32 AM   Result Value Ref Range    Pregnancy test,urine (POC) NEGATIVE  NEG         Recent Labs     03/03/19  2300   WBC 13.6*   HGB 13.8   HCT 41.4        Recent Labs     03/03/19  2300   *   K 2.7*      CO2 21   BUN 15   CREA 1.23*   *   CA 8.5   MG 2.2     Recent Labs     03/03/19  2300   SGOT 428*   * AP 72   TBILI 0.6   TP 8.3*   ALB 3.9   GLOB 4.4*       Recent Labs     03/03/19  2300   CPK 79,380*       No results for input(s): INR, PTP, APTT in the last 72 hours. No lab exists for component: INREXT     No results for input(s): PH, PCO2, PO2 in the last 72 hours. No results found. Assessment & Plan:     Rhabdomyolysis: the etiology is uncertain. She has significant hypokalemia which could precipitate rhabdo. She also started taking a weight-loss pill called Thermofight-X which contains multiple ingredients including chromium and a \"proprietery blend. \" UDS negative. CK 79,380 on admission.   -IVNS  -replete potassium, monitor other lytes  -trend CK  -pain control    Transaminitis: elevated AST likely due to muscle breakdown, ALT elevation probably due to mild liver damage from rhabdo.   -monitor LFTs    Hypokalemia:  -replete    DVT ppx: sq Lovenox  Code status: full  Disposition: home when ready    Signed By: Pito Kirk MD     March 4, 2019

## 2019-03-05 LAB
ALBUMIN SERPL-MCNC: 2.8 G/DL (ref 3.5–5)
ALBUMIN SERPL-MCNC: 2.8 G/DL (ref 3.5–5)
ALBUMIN/GLOB SERPL: 0.7 {RATIO} (ref 1.1–2.2)
ALBUMIN/GLOB SERPL: 0.7 {RATIO} (ref 1.1–2.2)
ALP SERPL-CCNC: 69 U/L (ref 45–117)
ALP SERPL-CCNC: 71 U/L (ref 45–117)
ALT SERPL-CCNC: 607 U/L (ref 12–78)
ALT SERPL-CCNC: 613 U/L (ref 12–78)
ANION GAP SERPL CALC-SCNC: 11 MMOL/L (ref 5–15)
ANION GAP SERPL CALC-SCNC: 12 MMOL/L (ref 5–15)
ANION GAP SERPL CALC-SCNC: 9 MMOL/L (ref 5–15)
AST SERPL-CCNC: >2000 U/L (ref 15–37)
AST SERPL-CCNC: >2000 U/L (ref 15–37)
BILIRUB DIRECT SERPL-MCNC: <0.1 MG/DL (ref 0–0.2)
BILIRUB SERPL-MCNC: 0.3 MG/DL (ref 0.2–1)
BILIRUB SERPL-MCNC: 0.3 MG/DL (ref 0.2–1)
BUN SERPL-MCNC: 24 MG/DL (ref 6–20)
BUN SERPL-MCNC: 24 MG/DL (ref 6–20)
BUN SERPL-MCNC: 28 MG/DL (ref 6–20)
BUN/CREAT SERPL: 17 (ref 12–20)
BUN/CREAT SERPL: 17 (ref 12–20)
BUN/CREAT SERPL: 19 (ref 12–20)
CALCIUM SERPL-MCNC: 5.7 MG/DL (ref 8.5–10.1)
CALCIUM SERPL-MCNC: 6.2 MG/DL (ref 8.5–10.1)
CALCIUM SERPL-MCNC: 6.2 MG/DL (ref 8.5–10.1)
CHLORIDE SERPL-SCNC: 104 MMOL/L (ref 97–108)
CHLORIDE SERPL-SCNC: 105 MMOL/L (ref 97–108)
CHLORIDE SERPL-SCNC: 99 MMOL/L (ref 97–108)
CK SERPL-CCNC: ABNORMAL U/L (ref 26–192)
CO2 SERPL-SCNC: 17 MMOL/L (ref 21–32)
CO2 SERPL-SCNC: 18 MMOL/L (ref 21–32)
CO2 SERPL-SCNC: 25 MMOL/L (ref 21–32)
CREAT SERPL-MCNC: 1.4 MG/DL (ref 0.55–1.02)
CREAT SERPL-MCNC: 1.41 MG/DL (ref 0.55–1.02)
CREAT SERPL-MCNC: 1.46 MG/DL (ref 0.55–1.02)
CREAT UR-MCNC: 56.9 MG/DL
GLOBULIN SER CALC-MCNC: 4.1 G/DL (ref 2–4)
GLOBULIN SER CALC-MCNC: 4.2 G/DL (ref 2–4)
GLUCOSE SERPL-MCNC: 95 MG/DL (ref 65–100)
GLUCOSE SERPL-MCNC: 95 MG/DL (ref 65–100)
GLUCOSE SERPL-MCNC: 96 MG/DL (ref 65–100)
MAGNESIUM SERPL-MCNC: 3.6 MG/DL (ref 1.6–2.4)
PHOSPHATE SERPL-MCNC: 7.9 MG/DL (ref 2.6–4.7)
POTASSIUM SERPL-SCNC: 5.3 MMOL/L (ref 3.5–5.1)
POTASSIUM SERPL-SCNC: 5.5 MMOL/L (ref 3.5–5.1)
POTASSIUM SERPL-SCNC: 5.5 MMOL/L (ref 3.5–5.1)
PROT SERPL-MCNC: 6.9 G/DL (ref 6.4–8.2)
PROT SERPL-MCNC: 7 G/DL (ref 6.4–8.2)
PROT UR-MCNC: 755 MG/DL (ref 0–11.9)
PROT/CREAT UR-RTO: 13.3
SODIUM SERPL-SCNC: 133 MMOL/L (ref 136–145)
SODIUM SERPL-SCNC: 133 MMOL/L (ref 136–145)
SODIUM SERPL-SCNC: 134 MMOL/L (ref 136–145)
SODIUM UR-SCNC: 28 MMOL/L

## 2019-03-05 PROCEDURE — 84100 ASSAY OF PHOSPHORUS: CPT

## 2019-03-05 PROCEDURE — 74011250636 HC RX REV CODE- 250/636: Performed by: HOSPITALIST

## 2019-03-05 PROCEDURE — 80053 COMPREHEN METABOLIC PANEL: CPT

## 2019-03-05 PROCEDURE — 74011000258 HC RX REV CODE- 258: Performed by: INTERNAL MEDICINE

## 2019-03-05 PROCEDURE — 74011000250 HC RX REV CODE- 250: Performed by: INTERNAL MEDICINE

## 2019-03-05 PROCEDURE — 84300 ASSAY OF URINE SODIUM: CPT

## 2019-03-05 PROCEDURE — 74011250636 HC RX REV CODE- 250/636: Performed by: INTERNAL MEDICINE

## 2019-03-05 PROCEDURE — 65270000029 HC RM PRIVATE

## 2019-03-05 PROCEDURE — 82550 ASSAY OF CK (CPK): CPT

## 2019-03-05 PROCEDURE — 74011000250 HC RX REV CODE- 250: Performed by: HOSPITALIST

## 2019-03-05 PROCEDURE — 36415 COLL VENOUS BLD VENIPUNCTURE: CPT

## 2019-03-05 PROCEDURE — 74011250636 HC RX REV CODE- 250/636: Performed by: NURSE PRACTITIONER

## 2019-03-05 PROCEDURE — 83735 ASSAY OF MAGNESIUM: CPT

## 2019-03-05 PROCEDURE — 80076 HEPATIC FUNCTION PANEL: CPT

## 2019-03-05 PROCEDURE — 84156 ASSAY OF PROTEIN URINE: CPT

## 2019-03-05 PROCEDURE — 86038 ANTINUCLEAR ANTIBODIES: CPT

## 2019-03-05 PROCEDURE — 74011250637 HC RX REV CODE- 250/637: Performed by: HOSPITALIST

## 2019-03-05 PROCEDURE — 80048 BASIC METABOLIC PNL TOTAL CA: CPT

## 2019-03-05 PROCEDURE — 74011000258 HC RX REV CODE- 258: Performed by: HOSPITALIST

## 2019-03-05 RX ORDER — CALCIUM CARBONATE 200(500)MG
400 TABLET,CHEWABLE ORAL
Status: DISCONTINUED | OUTPATIENT
Start: 2019-03-06 | End: 2019-03-18

## 2019-03-05 RX ORDER — SEVELAMER CARBONATE 800 MG/1
800 TABLET, FILM COATED ORAL
Status: DISCONTINUED | OUTPATIENT
Start: 2019-03-05 | End: 2019-03-09

## 2019-03-05 RX ORDER — MORPHINE SULFATE 2 MG/ML
2 INJECTION, SOLUTION INTRAMUSCULAR; INTRAVENOUS
Status: DISCONTINUED | OUTPATIENT
Start: 2019-03-05 | End: 2019-03-06

## 2019-03-05 RX ADMIN — MORPHINE SULFATE 2 MG: 2 INJECTION, SOLUTION INTRAMUSCULAR; INTRAVENOUS at 08:40

## 2019-03-05 RX ADMIN — CALCIUM GLUCONATE 2 G: 98 INJECTION, SOLUTION INTRAVENOUS at 20:34

## 2019-03-05 RX ADMIN — Medication 10 ML: at 15:26

## 2019-03-05 RX ADMIN — MORPHINE SULFATE 2 MG: 2 INJECTION, SOLUTION INTRAMUSCULAR; INTRAVENOUS at 22:06

## 2019-03-05 RX ADMIN — CALCIUM GLUCONATE 2 G: 98 INJECTION, SOLUTION INTRAVENOUS at 05:21

## 2019-03-05 RX ADMIN — Medication 10 ML: at 21:57

## 2019-03-05 RX ADMIN — ONDANSETRON HYDROCHLORIDE 4 MG: 2 INJECTION INTRAMUSCULAR; INTRAVENOUS at 20:37

## 2019-03-05 RX ADMIN — ONDANSETRON HYDROCHLORIDE 4 MG: 2 INJECTION INTRAMUSCULAR; INTRAVENOUS at 15:27

## 2019-03-05 RX ADMIN — SODIUM CHLORIDE 100 ML/HR: 900 INJECTION, SOLUTION INTRAVENOUS at 12:43

## 2019-03-05 RX ADMIN — MORPHINE SULFATE 2 MG: 2 INJECTION, SOLUTION INTRAMUSCULAR; INTRAVENOUS at 18:40

## 2019-03-05 RX ADMIN — MORPHINE SULFATE 2 MG: 2 INJECTION, SOLUTION INTRAMUSCULAR; INTRAVENOUS at 15:27

## 2019-03-05 RX ADMIN — SODIUM BICARBONATE: 84 INJECTION, SOLUTION INTRAVENOUS at 08:37

## 2019-03-05 RX ADMIN — ONDANSETRON HYDROCHLORIDE 4 MG: 2 INJECTION INTRAMUSCULAR; INTRAVENOUS at 00:31

## 2019-03-05 RX ADMIN — SODIUM BICARBONATE: 84 INJECTION, SOLUTION INTRAVENOUS at 21:56

## 2019-03-05 RX ADMIN — SODIUM BICARBONATE: 84 INJECTION, SOLUTION INTRAVENOUS at 15:53

## 2019-03-05 RX ADMIN — ONDANSETRON HYDROCHLORIDE 4 MG: 2 INJECTION INTRAMUSCULAR; INTRAVENOUS at 11:26

## 2019-03-05 RX ADMIN — MORPHINE SULFATE 2 MG: 2 INJECTION, SOLUTION INTRAMUSCULAR; INTRAVENOUS at 12:13

## 2019-03-05 RX ADMIN — ENOXAPARIN SODIUM 40 MG: 100 INJECTION SUBCUTANEOUS at 11:16

## 2019-03-05 RX ADMIN — MORPHINE SULFATE 2 MG: 2 INJECTION, SOLUTION INTRAMUSCULAR; INTRAVENOUS at 03:03

## 2019-03-05 RX ADMIN — OXYCODONE HYDROCHLORIDE 5 MG: 5 TABLET ORAL at 00:25

## 2019-03-05 RX ADMIN — ONDANSETRON HYDROCHLORIDE 4 MG: 2 INJECTION INTRAMUSCULAR; INTRAVENOUS at 05:12

## 2019-03-05 NOTE — PROGRESS NOTES
Problem: Falls - Risk of  Goal: *Absence of Falls  Document Alicia Fall Risk and appropriate interventions in the flowsheet.   Outcome: Progressing Towards Goal  Fall Risk Interventions:       Mentation Interventions: Adequate sleep, hydration, pain control    Medication Interventions: Patient to call before getting OOB, Teach patient to arise slowly    Elimination Interventions: Call light in reach, Patient to call for help with toileting needs, Toileting schedule/hourly rounds

## 2019-03-05 NOTE — PROGRESS NOTES
Metabolic acidosis worsening, HCO3 16->10. Will reduce IVNS to 100mL/hr and start bicarb drip at 125 mL/hr. Replete calcium.

## 2019-03-05 NOTE — PROGRESS NOTES
Hospitalist Progress Note  Elham Bonilla NP  Answering service: [phone] from in house phone  Cell: [phone]   Date of Service:  3/5/2019  NAME:  Susan Hensley  :  1990  MRN:  [id number]    Admission Summary:   Pt presented to the ED with generalized body aches and soreness with any movement. She has a hx of rhabdomyolysis x 2 other times in her life. Reported a mild cold but denied ay strenuous activity or heavy exertion. She hasn't started any new prescription meds, but started taking a weight loss pill called \"thermofight-x\" ~ 1 week PTA. She also reported she noticed her urine had turned a dark brown color. Due to her hx of rhabdo, she noted these symptoms and came to the ED with expectation of the diagnosis. Interval history / Subjective:   Pt is in bed, reports she is still having myalgias and it is still difficult to move (she is able to move, just very painful). We discussed plan for nephrologist to see her today due to increasing signs of renal dysfunction and steadily climbing CKs. Assessment & Plan:     Rhabdomyolysis:  - etiology is uncertain. - significant hypokalemia which could precipitate rhabdo. She also started taking a weight-loss pill called Thermofight-X which contains multiple ingredients including chromium and a \"proprietery blend. \"   - UDS negative  - CK now 657,600, trending this  - continue high rate of IVNS - this was changed to NS and bicarb to run together  - monitor electrolytes  - pain control with morphine or oxycodone  - TSH 0.30; JAMARCUS pending  - will need EMG outpatient, briefly discussed with neurologist  - pt has declined an in-patient muscle biopsy     Transaminitis:   - elevated AST likely due to muscle breakdown, ALT elevation probably due to mild liver damage from rhabdo  - monitor LFTs      Hypokalemia/Hyperkalemia:   - K+ mildly elevated, had been replaced yesterday due to hypokalemia  - on no replacement at present  - monitor labs    Hypocalcemia: repleted calcium x 3 thus far  - monitor labs    ITZ:   - creatinine up to 1.41 today, probably due to rhabdo  - nephrology consulted and he is aware of consult this am  - Holley placed 3/4 due to inability to void as well as increasing CK and need to better monitor output from a renal standpoint  - strict I/O  - output yesterday 1875 (no input documented from yesterday and pt was receiving at least 200 mL/hr). Discussed with nursing    Code status:Full  DVT prophylaxis: Lovenox  Care Plan discussed with: patient, attending MD  Disposition: home when able     Hospital Problems  Date Reviewed: 5/12/2016          Codes Class Noted POA    Hypokalemia ICD-10-CM: E87.6  ICD-9-CM: 276.8  3/4/2019 Yes        * (Principal) Rhabdomyolysis ICD-10-CM: C63.50  ICD-9-CM: 728.88  5/4/2016 Yes            Review of Systems:   Denies HA. No chest pain or pressure. No respiratory or GI complaints. +++generalized muscle soreness and pain to all muscles. Able to move, just painful. Vital Signs:    Last 24hrs VS reviewed since prior progress note. Most recent are:  Visit Vitals  BP (!) 148/92 (BP 1 Location: Right arm, BP Patient Position: At rest)   Pulse 80   Temp 97.7 °F (36.5 °C)   Resp 16   Ht 5' 3\" (1.6 m)   Wt 109 kg (240 lb 4.8 oz)   SpO2 95%   BMI 42.57 kg/m²       Intake/Output Summary (Last 24 hours) at 3/5/2019 1227  Last data filed at 3/5/2019 0731  Gross per 24 hour   Intake 2280 ml   Output 1375 ml   Net 905 ml      Physical Examination:         Constitutional:  Cooperative, pleasant. Looks uncomfortable    ENT:  Oral MM moist, oropharynx benign. Resp:  CTA bilaterally. No wheezing. No accessory muscle use and on RA. CV:  Regular rhythm, normal rate. No murmurs. GI:  Obese. Soft, non distended, non tender. Normoactive bowel sounds. Musculoskeletal:  No edema, warm, 2+ pulses throughout.      Neurologic:  Moves all extremities but slowly and with pain. AAOx3. Sensation is intact. Lines: Holley present, draining dark brown thick urine      Data Review:   Labs Reviewed. Labs:     Recent Labs     03/03/19  2300   WBC 13.6*   HGB 13.8   HCT 41.4        Recent Labs     03/05/19  0206 03/04/19  1830 03/04/19  1405 03/04/19  0741 03/03/19  2300   *  134* 140  --  137 134*   K 5.5*  5.5* 3.9 3.3* 2.8* 2.7*     104 110*  --  107 100   CO2 17*  18* 10*  --  16* 21   BUN 24*  24* 20  --  14 15   CREA 1.41*  1.40* 1.15*  --  1.01 1.23*   GLU 96  95 88  --  104* 106*   CA 6.2*  6.2* 6.3*  --  6.6* 8.5   MG 3.6*  --   --   --  2.2   PHOS 7.9*  --   --   --  4.4     Recent Labs     03/05/19 0206 03/03/19  2300   SGOT >2,000*  >2,000* 428*   *  613* 111*   AP 71  69 72   TBILI 0.3  0.3 0.6   TP 6.9  7.0 8.3*   ALB 2.8*  2.8* 3.9   GLOB 4.1*  4.2* 4.4*     No results for input(s): INR, PTP, APTT in the last 72 hours. No lab exists for component: INREXT, INREXT   No results for input(s): FE, TIBC, PSAT, FERR in the last 72 hours. No results found for: FOL, RBCF   No results for input(s): PH, PCO2, PO2 in the last 72 hours.   Recent Labs     03/05/19 0206 03/04/19 1405 03/04/19  0741   ,600* 435,081* 132,780*     No results found for: CHOL, CHOLX, CHLST, CHOLV, HDL, LDL, LDLC, DLDLP, TGLX, TRIGL, TRIGP, CHHD, CHHDX  Lab Results   Component Value Date/Time    Glucose (POC) 92 04/28/2013 11:56 AM     Lab Results   Component Value Date/Time    Color RED 03/04/2019 12:14 AM    Appearance BLOODY (A) 03/04/2019 12:14 AM    Specific gravity 1.025 03/04/2019 12:14 AM    Specific gravity 1.025 05/26/2016 07:42 PM    pH (UA) 7.0 03/04/2019 12:14 AM    Protein >300 (A) 03/04/2019 12:14 AM    Glucose 100 (A) 03/04/2019 12:14 AM    Ketone 15 (A) 03/04/2019 12:14 AM    Bilirubin NEGATIVE  05/26/2016 07:42 PM    Urobilinogen 0.2 03/04/2019 12:14 AM    Nitrites POSITIVE (A) 03/04/2019 12:14 AM    Leukocyte Esterase MODERATE (A) 03/04/2019 12:14 AM    Epithelial cells FEW 03/04/2019 12:14 AM    Bacteria 1+ (A) 03/04/2019 12:14 AM    WBC 5-10 03/04/2019 12:14 AM    RBC  03/04/2019 12:14 AM     Medications Reviewed:     Current Facility-Administered Medications   Medication Dose Route Frequency    morphine injection 2 mg  2 mg IntraVENous Q3H PRN    sodium chloride (NS) flush 5-40 mL  5-40 mL IntraVENous Q8H    sodium chloride (NS) flush 5-40 mL  5-40 mL IntraVENous PRN    enoxaparin (LOVENOX) injection 40 mg  40 mg SubCUTAneous Q24H    0.9% sodium chloride infusion  100 mL/hr IntraVENous CONTINUOUS    oxyCODONE IR (ROXICODONE) tablet 5 mg  5 mg Oral Q4H PRN    acetaminophen (TYLENOL) tablet 500 mg  500 mg Oral Q6H PRN    sodium bicarbonate (8.4%) 150 mEq in sterile water 1,000 mL infusion   IntraVENous CONTINUOUS    ondansetron (ZOFRAN) injection 4 mg  4 mg IntraVENous Q4H PRN   ______________________________________________________________________  EXPECTED LENGTH OF STAY: 3d 4h  ACTUAL LENGTH OF STAY:          1               Ap Aquino NP

## 2019-03-05 NOTE — CONSULTS
NEPHROLOGY CONSULT NOTE     Patient: Anila Luther MRN: [id number]  PCP: None   :     1990  Age:   34 y.o. Sex:  female      Referring physician: Marvel Billings MD       Reason for consultation:     ITZ   Rhabdomyolysis      Ms. Maria Elena Jaquez was seen and examined in Room 546 @ [facility] this afternoon. Admission Date: 3/3/2019  9:59 PM  LOS: 1 day     DISCUSSION / PLAN :   The picture is very suggestive of rhabdomyolysis . The precipitant on this occasion seems to have been the weight loss supplement Thermo-Fight  X. Interestingly, there have been other episodes of rhabdo with no clear-cut precipitating factors. Recommendations    Urine Lytes  Increase the rate of the Bicarb Drip to 250 mls per hour. D/C N/Saline  Renvela as a phos binder. Exertion caution with calcium supplements  Avoid NSAIDs + IV contrast  Hold off on renal imaging for now. Follow lytes q 8 hourly  Consider a Neurology Consult. She may have ? Farzad Garrett's  Old chart from [facility]         Active Problems / Assessment AAActive  :   Principal Problem:    Rhabdomyolysis (2016)    Active Problems:    Hypokalemia (3/4/2019)    Acute Kidney Injury --> likely sec to Rhabdo  Hyponatremia (Mild)  Hyperkalemia - secondary to ITZ  Metabolic Acidosis - sec to ITZ  Hypocalcemia  Hyperphosphatemia  Proteinuria - sec to Rhabdo  Hematuria - sec to Rhabdo     Subjective:     Soreness      HPI:        Ms. [address] is a 34year old young lady with no major chronic medical conditions. She was previously seen @ [facility] [place] for rhabdomyolysis. At that time, she reported prior episodes with no clear-cut precipitating factors. She was seen at [facility] as well. She started a weight loss supplement \" Thermo Fight X\" one week ago. Two days prior to presentation, she had severe myalgia , arthralgia and \" soreness \" . She was unable to get up from a seated position. There was no history or recent viral infections or sick contacts.  She was not taking any prescription meds or NSAIDs. She also reported a cough and vomiting. There was no history of fever. She was evaluated in the ER @ [facility]. Her serum creatinine was 1.1  However, it has risen to 1.4. The serum CK level has increased from just over 132 K to 657, 600. Ms. Maria Elena Jaquez was admitted and placed on IV fluids. Past Medical Hx:   Past Medical History:   Diagnosis Date    Other ill-defined conditions(799.89)     rhabdo    Rhabdomyolysis     Traumatic rhabdomyolysis ([facility] [address] 5/12/2016        Past Surgical Hx:   History reviewed. No pertinent surgical history. Medications:  Prior to Admission medications    Medication Sig Start Date End Date Taking?  Authorizing Provider   OTHER Thermofight X, weight loss supplement   Yes Provider, Historical       Allergies   Allergen Reactions    Compazine [Prochlorperazine Edisylate] Anaphylaxis     stroke    Midol [Acetaminophen-Pamabrom] Anaphylaxis     dont take again because of rabdo    Zofran [Ondansetron Hcl (Pf)] Nausea and Vomiting    Aspirin Other (comments)     Never take again because of rabdo    Compazine [Prochlorperazine Edisylate] Other (comments)     Stroke symptoms    Contrast Agent [Iodine] Other (comments)     Kidney problems    Ibuprofen Other (comments)     Kidney problem,rhabdo      Motrin [Ibuprofen] Nausea and Vomiting    Zofran [Ondansetron Hcl (Pf)] Nausea and Vomiting       Social Hx:     She has smoked cigarettes in the past.     Family History   Problem Relation Age of Onset    Diabetes Father     Diabetes Maternal Grandmother     Diabetes Maternal Grandfather        Mother + Father ---> Hx of Rhabdomyolysis    Review of Systems:    General --> chills    CVS -> Neg for chest pain    RS --->  Cough    GI --> Vomiting (food previously consumed)            Renal --> neg for dysuria    ENT --> Sore throat    M/S --> Arthralgia    CNS --> Headache           Objective:    Vitals:    Vitals:    03/04/19 1552 03/04/19 1950 03/05/19 0302 03/05/19 0838   BP: 123/74 125/85 111/72 (!) 148/92   Pulse: 79 84 93 80   Resp: 16 16 16 16   Temp: 97.7 °F (36.5 °C) 97.8 °F (36.6 °C) 97.4 °F (36.3 °C) 97.7 °F (36.5 °C)   SpO2: 97% 93% 100% 95%   Weight:       Height:         I&O's:  03/04 0701 - 03/05 0700  In: -   Out: 1875 [Urine:1875]  Visit Vitals  BP (!) 148/92 (BP 1 Location: Right arm, BP Patient Position: At rest)   Pulse 80   Temp 97.7 °F (36.5 °C)   Resp 16   Ht 5' 3\" (1.6 m)   Wt 109 kg (240 lb 4.8 oz)   SpO2 95%   BMI 42.57 kg/m²       Physical Exam:      Seen in Room 546 @ [facility]. Her  was present during my visit as well. General: Calm    HEENT: Sclerae without icterus                 Moist mucous membranes     Lungs : Clear  to auscultation, no wheezes, no rales    CVS: RRR, S1 , S2 ,  No S3 gallop     Abdomen: Soft, Non tender,no guarding    Extremities: No cyanosis (hands)      MS:  No tenderness in the upper extremity muscle groups. No calf tenderness (R+L)     Holley --> with concentrated urine    Neurologic:  Responding appropriately.     Psych:  Seemed a bit flat           Laboratory Results:    Lab Results   Component Value Date    BUN 24 (H) 03/05/2019    BUN 24 (H) 03/05/2019     (L) 03/05/2019     (L) 03/05/2019    K 5.5 (H) 03/05/2019    K 5.5 (H) 03/05/2019     03/05/2019     03/05/2019    CO2 18 (L) 03/05/2019    CO2 17 (L) 03/05/2019       Lab Results   Component Value Date    BUN 24 (H) 03/05/2019    BUN 24 (H) 03/05/2019    BUN 20 03/04/2019    BUN 14 03/04/2019    BUN 15 03/03/2019    K 5.5 (H) 03/05/2019    K 5.5 (H) 03/05/2019    K 3.9 03/04/2019    K 3.3 (L) 03/04/2019    K 2.8 (L) 03/04/2019       Lab Results   Component Value Date    WBC 13.6 (H) 03/03/2019    RBC 4.88 03/03/2019    HGB 13.8 03/03/2019    HCT 41.4 03/03/2019    MCV 84.8 03/03/2019    MCH 28.3 03/03/2019    RDW 13.1 03/03/2019     03/03/2019       Lab Results   Component Value Date    PHOS 7.9 (H) 03/05/2019       Urine dipstick:   Lab Results   Component Value Date/Time    Color RED 03/04/2019 12:14 AM    Appearance BLOODY (A) 03/04/2019 12:14 AM    Specific gravity 1.025 03/04/2019 12:14 AM    Specific gravity 1.025 05/26/2016 07:42 PM    pH (UA) 7.0 03/04/2019 12:14 AM    Protein >300 (A) 03/04/2019 12:14 AM    Glucose 100 (A) 03/04/2019 12:14 AM    Ketone 15 (A) 03/04/2019 12:14 AM    Bilirubin NEGATIVE  05/26/2016 07:42 PM    Urobilinogen 0.2 03/04/2019 12:14 AM    Nitrites POSITIVE (A) 03/04/2019 12:14 AM    Leukocyte Esterase MODERATE (A) 03/04/2019 12:14 AM    Epithelial cells FEW 03/04/2019 12:14 AM    Bacteria 1+ (A) 03/04/2019 12:14 AM    WBC 5-10 03/04/2019 12:14 AM    RBC  03/04/2019 12:14 AM       I have reviewed the following: All pertinent labs, microbiology data, radiology imaging for my assessment       Care Plan discussed with:  Mrs. [address] and     Chart reviewed. Thank you for allowing us to participate in the care of this patient. We will follow patient. Please dont hesitate to call with any questions    Riccardo Wakefield MD  3/5/2019    Eder Camacho [place] [address]  Phone - [phone]   Fax - [phone]  [email or web address]

## 2019-03-05 NOTE — PROGRESS NOTES
Notified Irina Barragan, NP of patient complaint of bilateral \"tingling\" sensation in her cheeks. Mickeal Gammons is not concerned and feels that this is related to her severe electrolyte abnormalities.

## 2019-03-06 LAB
25(OH)D3 SERPL-MCNC: 9.7 NG/ML (ref 30–100)
ANA SER QL: NEGATIVE
ANION GAP SERPL CALC-SCNC: 10 MMOL/L (ref 5–15)
ANION GAP SERPL CALC-SCNC: 11 MMOL/L (ref 5–15)
ARTERIAL PATENCY WRIST A: ABNORMAL
ATRIAL RATE: 75 BPM
ATRIAL RATE: 79 BPM
BASE EXCESS BLD CALC-SCNC: 8 MMOL/L
BASOPHILS # BLD: 0 K/UL (ref 0–0.1)
BASOPHILS NFR BLD: 0 % (ref 0–1)
BDY SITE: ABNORMAL
BUN SERPL-MCNC: 28 MG/DL (ref 6–20)
BUN SERPL-MCNC: 30 MG/DL (ref 6–20)
BUN/CREAT SERPL: 21 (ref 12–20)
BUN/CREAT SERPL: 21 (ref 12–20)
CA-I BLD-SCNC: 0.59 MMOL/L (ref 1.12–1.32)
CALCIUM SERPL-MCNC: 5.6 MG/DL (ref 8.5–10.1)
CALCIUM SERPL-MCNC: 5.8 MG/DL (ref 8.5–10.1)
CALCULATED P AXIS, ECG09: 28 DEGREES
CALCULATED P AXIS, ECG09: 33 DEGREES
CALCULATED R AXIS, ECG10: 6 DEGREES
CALCULATED R AXIS, ECG10: 8 DEGREES
CALCULATED T AXIS, ECG11: 2 DEGREES
CALCULATED T AXIS, ECG11: 8 DEGREES
CHLORIDE SERPL-SCNC: 91 MMOL/L (ref 97–108)
CHLORIDE SERPL-SCNC: 93 MMOL/L (ref 97–108)
CK MB CFR SERPL CALC: ABNORMAL % (ref 0–2.5)
CK MB SERPL-MCNC: 58.6 NG/ML (ref 5–25)
CK SERPL-CCNC: ABNORMAL U/L
CK SERPL-CCNC: ABNORMAL U/L (ref 26–192)
CO2 SERPL-SCNC: 27 MMOL/L (ref 21–32)
CO2 SERPL-SCNC: 31 MMOL/L (ref 21–32)
COMMENT, HOLDF: NORMAL
CREAT SERPL-MCNC: 1.33 MG/DL (ref 0.55–1.02)
CREAT SERPL-MCNC: 1.42 MG/DL (ref 0.55–1.02)
CRP SERPL HS-MCNC: >9.5 MG/L
DIAGNOSIS, 93000: NORMAL
DIAGNOSIS, 93000: NORMAL
DIFFERENTIAL METHOD BLD: ABNORMAL
EOSINOPHIL # BLD: 0 K/UL (ref 0–0.4)
EOSINOPHIL NFR BLD: 0 % (ref 0–7)
ERYTHROCYTE [DISTWIDTH] IN BLOOD BY AUTOMATED COUNT: 13.2 % (ref 11.5–14.5)
ERYTHROCYTE [SEDIMENTATION RATE] IN BLOOD: 1 MM/HR (ref 0–20)
GAS FLOW.O2 O2 DELIVERY SYS: ABNORMAL L/MIN
GLUCOSE SERPL-MCNC: 97 MG/DL (ref 65–100)
GLUCOSE SERPL-MCNC: 98 MG/DL (ref 65–100)
HCO3 BLD-SCNC: 31.5 MMOL/L (ref 22–26)
HCT VFR BLD AUTO: 43.7 % (ref 35–47)
HCT VFR BLD AUTO: 44.1 % (ref 35–47)
HGB BLD-MCNC: 14.5 G/DL (ref 11.5–16)
HGB BLD-MCNC: 14.8 G/DL (ref 11.5–16)
IMM GRANULOCYTES # BLD AUTO: 0 K/UL (ref 0–0.04)
IMM GRANULOCYTES NFR BLD AUTO: 0 % (ref 0–0.5)
LYMPHOCYTES # BLD: 1.1 K/UL (ref 0.8–3.5)
LYMPHOCYTES NFR BLD: 9 % (ref 12–49)
MCH RBC QN AUTO: 27.2 PG (ref 26–34)
MCHC RBC AUTO-ENTMCNC: 32.9 G/DL (ref 30–36.5)
MCV RBC AUTO: 82.7 FL (ref 80–99)
MONOCYTES # BLD: 0.5 K/UL (ref 0–1)
MONOCYTES NFR BLD: 4 % (ref 5–13)
NEUTS SEG # BLD: 10.3 K/UL (ref 1.8–8)
NEUTS SEG NFR BLD: 87 % (ref 32–75)
NRBC # BLD: 0 K/UL (ref 0–0.01)
NRBC BLD-RTO: 0 PER 100 WBC
O2/TOTAL GAS SETTING VFR VENT: 0.21 %
P-R INTERVAL, ECG05: 144 MS
P-R INTERVAL, ECG05: 150 MS
PCO2 BLD: 40.6 MMHG (ref 35–45)
PH BLD: 7.5 [PH] (ref 7.35–7.45)
PHOSPHATE SERPL-MCNC: 7 MG/DL (ref 2.6–4.7)
PLATELET # BLD AUTO: 278 K/UL (ref 150–400)
PMV BLD AUTO: 9.3 FL (ref 8.9–12.9)
PO2 BLD: 68 MMHG (ref 80–100)
POTASSIUM SERPL-SCNC: 4.9 MMOL/L (ref 3.5–5.1)
POTASSIUM SERPL-SCNC: 5 MMOL/L (ref 3.5–5.1)
Q-T INTERVAL, ECG07: 456 MS
Q-T INTERVAL, ECG07: 470 MS
QRS DURATION, ECG06: 80 MS
QRS DURATION, ECG06: 84 MS
QTC CALCULATION (BEZET), ECG08: 522 MS
QTC CALCULATION (BEZET), ECG08: 524 MS
RBC # BLD AUTO: 5.33 M/UL (ref 3.8–5.2)
RBC MORPH BLD: ABNORMAL
SAMPLES BEING HELD,HOLD: NORMAL
SAO2 % BLD: 95 % (ref 92–97)
SODIUM SERPL-SCNC: 131 MMOL/L (ref 136–145)
SODIUM SERPL-SCNC: 132 MMOL/L (ref 136–145)
SPECIMEN TYPE: ABNORMAL
T3FREE SERPL-MCNC: 1.5 PG/ML (ref 2.2–4)
T4 FREE SERPL-MCNC: 1.2 NG/DL (ref 0.8–1.5)
TROPONIN I SERPL-MCNC: <0.05 NG/ML
VENTRICULAR RATE, ECG03: 75 BPM
VENTRICULAR RATE, ECG03: 79 BPM
WBC # BLD AUTO: 11.9 K/UL (ref 3.6–11)

## 2019-03-06 PROCEDURE — 65660000000 HC RM CCU STEPDOWN

## 2019-03-06 PROCEDURE — 76937 US GUIDE VASCULAR ACCESS: CPT

## 2019-03-06 PROCEDURE — 84439 ASSAY OF FREE THYROXINE: CPT

## 2019-03-06 PROCEDURE — 82803 BLOOD GASES ANY COMBINATION: CPT

## 2019-03-06 PROCEDURE — 80048 BASIC METABOLIC PNL TOTAL CA: CPT

## 2019-03-06 PROCEDURE — 86141 C-REACTIVE PROTEIN HS: CPT

## 2019-03-06 PROCEDURE — 02HV33Z INSERTION OF INFUSION DEVICE INTO SUPERIOR VENA CAVA, PERCUTANEOUS APPROACH: ICD-10-PCS | Performed by: INTERNAL MEDICINE

## 2019-03-06 PROCEDURE — 82550 ASSAY OF CK (CPK): CPT

## 2019-03-06 PROCEDURE — 74011250637 HC RX REV CODE- 250/637: Performed by: INTERNAL MEDICINE

## 2019-03-06 PROCEDURE — 85652 RBC SED RATE AUTOMATED: CPT

## 2019-03-06 PROCEDURE — 84484 ASSAY OF TROPONIN QUANT: CPT

## 2019-03-06 PROCEDURE — 77030018719 HC DRSG PTCH ANTIMIC J&J -A

## 2019-03-06 PROCEDURE — C1751 CATH, INF, PER/CENT/MIDLINE: HCPCS

## 2019-03-06 PROCEDURE — 85018 HEMOGLOBIN: CPT

## 2019-03-06 PROCEDURE — 36415 COLL VENOUS BLD VENIPUNCTURE: CPT

## 2019-03-06 PROCEDURE — 74011000250 HC RX REV CODE- 250: Performed by: INTERNAL MEDICINE

## 2019-03-06 PROCEDURE — 85025 COMPLETE CBC W/AUTO DIFF WBC: CPT

## 2019-03-06 PROCEDURE — 84100 ASSAY OF PHOSPHORUS: CPT

## 2019-03-06 PROCEDURE — 77030020847 HC STATLOK BARD -A

## 2019-03-06 PROCEDURE — 82306 VITAMIN D 25 HYDROXY: CPT

## 2019-03-06 PROCEDURE — 84481 FREE ASSAY (FT-3): CPT

## 2019-03-06 PROCEDURE — 74011250636 HC RX REV CODE- 250/636: Performed by: NURSE PRACTITIONER

## 2019-03-06 PROCEDURE — 74011000258 HC RX REV CODE- 258: Performed by: NURSE PRACTITIONER

## 2019-03-06 PROCEDURE — 36569 INSJ PICC 5 YR+ W/O IMAGING: CPT | Performed by: NURSE PRACTITIONER

## 2019-03-06 PROCEDURE — 93005 ELECTROCARDIOGRAM TRACING: CPT

## 2019-03-06 PROCEDURE — 74011250636 HC RX REV CODE- 250/636: Performed by: HOSPITALIST

## 2019-03-06 PROCEDURE — 74011250636 HC RX REV CODE- 250/636: Performed by: INTERNAL MEDICINE

## 2019-03-06 RX ORDER — HYDROMORPHONE HYDROCHLORIDE 1 MG/ML
1 INJECTION, SOLUTION INTRAMUSCULAR; INTRAVENOUS; SUBCUTANEOUS
Status: DISCONTINUED | OUTPATIENT
Start: 2019-03-06 | End: 2019-03-12 | Stop reason: CLARIF

## 2019-03-06 RX ORDER — SODIUM CHLORIDE 9 MG/ML
250 INJECTION, SOLUTION INTRAVENOUS CONTINUOUS
Status: DISCONTINUED | OUTPATIENT
Start: 2019-03-06 | End: 2019-03-09

## 2019-03-06 RX ADMIN — ONDANSETRON HYDROCHLORIDE 4 MG: 2 INJECTION INTRAMUSCULAR; INTRAVENOUS at 16:57

## 2019-03-06 RX ADMIN — HYDROMORPHONE HYDROCHLORIDE 1 MG: 1 INJECTION, SOLUTION INTRAMUSCULAR; INTRAVENOUS; SUBCUTANEOUS at 17:10

## 2019-03-06 RX ADMIN — ONDANSETRON HYDROCHLORIDE 4 MG: 2 INJECTION INTRAMUSCULAR; INTRAVENOUS at 21:47

## 2019-03-06 RX ADMIN — SODIUM BICARBONATE: 84 INJECTION, SOLUTION INTRAVENOUS at 19:22

## 2019-03-06 RX ADMIN — HYDROMORPHONE HYDROCHLORIDE 1 MG: 1 INJECTION, SOLUTION INTRAMUSCULAR; INTRAVENOUS; SUBCUTANEOUS at 21:46

## 2019-03-06 RX ADMIN — MORPHINE SULFATE 2 MG: 2 INJECTION, SOLUTION INTRAMUSCULAR; INTRAVENOUS at 02:40

## 2019-03-06 RX ADMIN — MORPHINE SULFATE 2 MG: 2 INJECTION, SOLUTION INTRAMUSCULAR; INTRAVENOUS at 08:44

## 2019-03-06 RX ADMIN — Medication 10 ML: at 05:39

## 2019-03-06 RX ADMIN — SODIUM BICARBONATE: 84 INJECTION, SOLUTION INTRAVENOUS at 14:56

## 2019-03-06 RX ADMIN — CALCIUM GLUCONATE 2 G: 98 INJECTION, SOLUTION INTRAVENOUS at 11:07

## 2019-03-06 RX ADMIN — Medication 10 ML: at 08:44

## 2019-03-06 RX ADMIN — HYDROMORPHONE HYDROCHLORIDE 1 MG: 1 INJECTION, SOLUTION INTRAMUSCULAR; INTRAVENOUS; SUBCUTANEOUS at 19:22

## 2019-03-06 RX ADMIN — ONDANSETRON HYDROCHLORIDE 4 MG: 2 INJECTION INTRAMUSCULAR; INTRAVENOUS at 00:50

## 2019-03-06 RX ADMIN — Medication 10 ML: at 14:00

## 2019-03-06 RX ADMIN — CALCIUM GLUCONATE 2 G: 98 INJECTION, SOLUTION INTRAVENOUS at 16:47

## 2019-03-06 RX ADMIN — ENOXAPARIN SODIUM 40 MG: 100 INJECTION SUBCUTANEOUS at 11:07

## 2019-03-06 RX ADMIN — SODIUM BICARBONATE: 84 INJECTION, SOLUTION INTRAVENOUS at 02:45

## 2019-03-06 RX ADMIN — HYDROMORPHONE HYDROCHLORIDE 1 MG: 1 INJECTION, SOLUTION INTRAMUSCULAR; INTRAVENOUS; SUBCUTANEOUS at 14:48

## 2019-03-06 RX ADMIN — HYDROMORPHONE HYDROCHLORIDE 1 MG: 1 INJECTION, SOLUTION INTRAMUSCULAR; INTRAVENOUS; SUBCUTANEOUS at 11:07

## 2019-03-06 RX ADMIN — SODIUM CHLORIDE 250 ML/HR: 900 INJECTION, SOLUTION INTRAVENOUS at 22:22

## 2019-03-06 RX ADMIN — MORPHINE SULFATE 2 MG: 2 INJECTION, SOLUTION INTRAMUSCULAR; INTRAVENOUS at 05:39

## 2019-03-06 RX ADMIN — ONDANSETRON HYDROCHLORIDE 4 MG: 2 INJECTION INTRAMUSCULAR; INTRAVENOUS at 07:51

## 2019-03-06 RX ADMIN — SODIUM BICARBONATE: 84 INJECTION, SOLUTION INTRAVENOUS at 07:32

## 2019-03-06 RX ADMIN — Medication 10 ML: at 21:46

## 2019-03-06 NOTE — PROGRESS NOTES
Hospitalist Progress Note  John Luevano NP  Answering service: [phone] from in house phone  Cell: [phone]   Date of Service:  3/6/2019  NAME:  Shereen Zamora  :  1990  MRN:  [id number]    Admission Summary:   Pt presented to the ED with generalized body aches and soreness with any movement. She has a hx of rhabdomyolysis x 2 other times in her life. Reported a mild cold but denied ay strenuous activity or heavy exertion. She hasn't started any new prescription meds, but started taking a weight loss pill called \"thermofight-x\" ~ 1 week PTA. She also reported she noticed her urine had turned a dark brown color. Due to her hx of rhabdo, she noted these symptoms and came to the ED with expectation of the diagnosis. Interval history / Subjective:   RRT called due to pain - also had a RRT several hours earlier due to reported chest pain and pressure. EKG was unchanged. Troponin was negative earlier in am. We discussed change of pain medications to help control pain: changing to dilaudid 1 mg q 2 hours. She is aware that this may be adjusted as needed. Was able to discuss plan of care with nephrologist and pts mother/ at bedside. Pt seen on several occasions today: 0800; 1030     Assessment & Plan:     Rhabdomyolysis:  - etiology is uncertain. - significant hypokalemia which could precipitate rhabdo. She also started taking a weight-loss pill called Thermofight-X which contains multiple ingredients including chromium and a \"proprietery blend. \"   - UDS negative  - CK now >900,000 trending this  - bicarb in IVF running at 250 mL/hr  - monitor electrolytes  - pain not controlled - will change regimen to dilaudid. Pt encouraged to ask for pain medications as these can not be scheduled. She agrees  - TSH 0.30; JAMARCUS pending  - pt has declined an in-patient muscle biopsy again - has not yet decided to proceed with this. Neurology has seen and encouraged it to be done. If it can not be done in am, preferences is to have done on Monday due to testing time frames.      Transaminitis:   - elevated AST likely due to muscle breakdown, ALT elevation probably due to mild liver damage from rhabdo    Hypokalemia/Hyperkalemia: normalized    Hypocalcemia: repleting calcium based on correct calcium (goal corrected 7.5-8.0)  - pt is having symptoms: hand cramping, ++Chvosteks sign    ITZ:   - creatinine stable (1.31), probably due to rhabdo  - nephrology following  - Holley placed 3/4 due to inability to void as well as increasing CK and need to better monitor output from a renal standpoint - urine is brown/red  - strict I/O: 11L in/2.2 L out    Code status:Full  DVT prophylaxis: Lovenox  Care Plan discussed with: patient//family, attending MD, nephrology  Disposition: home when able     Hospital Problems  Date Reviewed: 5/12/2016          Codes Class Noted POA    Hypokalemia ICD-10-CM: E87.6  ICD-9-CM: 276.8  3/4/2019 Yes        * (Principal) Rhabdomyolysis ICD-10-CM: Y32.16  ICD-9-CM: 728.88  5/4/2016 Yes            Review of Systems:   Denies HA. Feels heavy everywhere, + chest heaviness and discomfort but no SOB. Poor appetite +++generalized muscle soreness and pain to all muscles. Able to move, just painful. Vital Signs:    Last 24hrs VS reviewed since prior progress note. Most recent are:  Visit Vitals  /79 (BP 1 Location: Right arm, BP Patient Position: At rest)   Pulse 79   Temp 97.4 °F (36.3 °C)   Resp 18   Ht 5' 3\" (1.6 m)   Wt 115.5 kg (254 lb 10.1 oz)   SpO2 100%   BMI 45.11 kg/m²       Intake/Output Summary (Last 24 hours) at 3/6/2019 1811  Last data filed at 3/6/2019 1217  Gross per 24 hour   Intake 5374.16 ml   Output 2550 ml   Net 2824.16 ml      Physical Examination:         Constitutional:  Cooperative, pleasant. Looks uncomfortable    ENT:  Oral MM moist, oropharynx benign. Resp:  CTA bilaterally.  No wheezing. No accessory muscle use and on RA. CV:  Regular rhythm, normal rate. No murmurs. GI:  Obese. Soft, non distended, non tender. Normoactive bowel sounds. Musculoskeletal:  No edema, warm, 2+ pulses throughout. Neurologic:  Moves all extremities but slowly and with pain - allows more passive movement today. + Chvosteks sign. Having some hand cramping. AAOx3. Sensation is intact. Lines: Holley present, draining red urine      Data Review:   Labs Reviewed. Labs:     Recent Labs     03/06/19  1003 03/06/19  0236 03/03/19  2300   WBC  --  11.9* 13.6*   HGB 14.8 14.5 13.8   HCT 43.7 44.1 41.4   PLT  --  278 256     Recent Labs     03/06/19  1353 03/06/19  0236 03/05/19  1752 03/05/19  0206  03/03/19  2300   * 131* 133* 133*  134*   < > 134*   K 4.9 5.0 5.3* 5.5*  5.5*   < > 2.7*   CL 91* 93* 99 105  104   < > 100   CO2 31 27 25 17*  18*   < > 21   BUN 28* 30* 28* 24*  24*   < > 15   CREA 1.33* 1.42* 1.46* 1.41*  1.40*   < > 1.23*   GLU 98 97 95 96  95   < > 106*   CA 5.6* 5.8* 5.7* 6.2*  6.2*   < > 8.5   MG  --   --   --  3.6*  --  2.2   PHOS  --  7.0*  --  7.9*  --  4.4    < > = values in this interval not displayed. Recent Labs     03/05/19  0206 03/03/19  2300   SGOT >2,000*  >2,000* 428*   *  613* 111*   AP 71  69 72   TBILI 0.3  0.3 0.6   TP 6.9  7.0 8.3*   ALB 2.8*  2.8* 3.9   GLOB 4.1*  4.2* 4.4*     No results for input(s): INR, PTP, APTT in the last 72 hours. No lab exists for component: INREXT, INREXT   No results for input(s): FE, TIBC, PSAT, FERR in the last 72 hours. No results found for: FOL, RBCF   No results for input(s): PH, PCO2, PO2 in the last 72 hours. Recent Labs     03/06/19  1353 03/06/19  0236 03/05/19  1752   ,600* QUANTITY NOT SUFFICIENT.  SUGGEST RECOLLECTION SANJUANITA AT 0959/,800*   CKNDX  --  Cannot be calculated  --    TROIQ  --  <0.05  --      No results found for: CHOL, CHOLX, CHLST, CHOLV, HDL, LDL, LDLC, DLDLP, Valeri Banda, [facility], [facility]  Lab Results   Component Value Date/Time    Glucose (POC) 92 04/28/2013 11:56 AM     Lab Results   Component Value Date/Time    Color RED 03/04/2019 12:14 AM    Appearance BLOODY (A) 03/04/2019 12:14 AM    Specific gravity 1.025 03/04/2019 12:14 AM    Specific gravity 1.025 05/26/2016 07:42 PM    pH (UA) 7.0 03/04/2019 12:14 AM    Protein >300 (A) 03/04/2019 12:14 AM    Glucose 100 (A) 03/04/2019 12:14 AM    Ketone 15 (A) 03/04/2019 12:14 AM    Bilirubin NEGATIVE  05/26/2016 07:42 PM    Urobilinogen 0.2 03/04/2019 12:14 AM    Nitrites POSITIVE (A) 03/04/2019 12:14 AM    Leukocyte Esterase MODERATE (A) 03/04/2019 12:14 AM    Epithelial cells FEW 03/04/2019 12:14 AM    Bacteria 1+ (A) 03/04/2019 12:14 AM    WBC 5-10 03/04/2019 12:14 AM    RBC  03/04/2019 12:14 AM     Medications Reviewed:     Current Facility-Administered Medications   Medication Dose Route Frequency    HYDROmorphone (PF) (DILAUDID) injection 1 mg  1 mg IntraVENous Q2H PRN    calcium gluconate 2 g in 0.9% sodium chloride 100 mL IVPB  2 g IntraVENous ONCE    sevelamer carbonate (RENVELA) tab 800 mg  800 mg Oral TID WITH MEALS    calcium carbonate (TUMS) chewable tablet 400 mg [elemental]  400 mg Oral TID WITH MEALS    sodium chloride (NS) flush 5-40 mL  5-40 mL IntraVENous Q8H    sodium chloride (NS) flush 5-40 mL  5-40 mL IntraVENous PRN    enoxaparin (LOVENOX) injection 40 mg  40 mg SubCUTAneous Q24H    oxyCODONE IR (ROXICODONE) tablet 5 mg  5 mg Oral Q4H PRN    acetaminophen (TYLENOL) tablet 500 mg  500 mg Oral Q6H PRN    sodium bicarbonate (8.4%) 150 mEq in sterile water 1,000 mL infusion   IntraVENous CONTINUOUS    ondansetron (ZOFRAN) injection 4 mg  4 mg IntraVENous Q4H PRN   ______________________________________________________________________  EXPECTED LENGTH OF STAY: 3d 4h  ACTUAL LENGTH OF STAY:          2               Toni Borjas NP

## 2019-03-06 NOTE — PROGRESS NOTES
TRANSFER - IN REPORT:    Verbal report received from enzo rn(name) on [address]  being received from [facility](unit) for routine progression of care      Report consisted of patients Situation, Background, Assessment and   Recommendations(SBAR). Information from the following report(s) Kardex, ED Summary, Procedure Summary, Intake/Output, MAR and Cardiac Rhythm nsr was reviewed with the receiving nurse. Opportunity for questions and clarification was provided. Assessment completed upon patients arrival to unit and care assumed.

## 2019-03-06 NOTE — PROGRESS NOTES
At. 6541 patient complained of tightness in her chest heaviness in bilateral arms. A rapid was called. CKMB, troponin and EKG was done, EKG was normal, vitals WNL for patients, awaiting labs results.

## 2019-03-06 NOTE — PROGRESS NOTES
Name: Susanne Finch MRN: [id number]   : 1990 Hospital: . [address]   Date: 3/6/2019        IMPRESSION:   Acute Kidney Injury - Sec to Rhabdo --> with likely ATN  Met Acidosis -sec to ITZ  Hyperphosphatemia - sec to Rhabdo  Hyperkalemia - resolved  Hypocalcemia - symptomatic  Hyponatremia - mild - likely sec to ITZ with impaired free water clearance  Proteinuria - sec to Rhabdo      PLAN:   Her GFR is stable. Continue the Bicarb infusion at the present rate for now. Replete calcium (oral + IV). Would aim for a corrected calcium closer to 8 (7.5 to 8) initially. Follow lytes q 8 for now. OK for a PICC. There is no history of CKD  Continue sevelamer. Agree with Neuro Consult      Subjective/Interval History:       3/6/19  - F/U ITZ      The events were reviewed and discussed with Sunita(NP).  Ms [place] reported myalgia and muscle cramps. There were no new GI symptoms. Objective:   Vital Signs:    Visit Vitals  /81   Pulse 78   Temp 97.7 °F (36.5 °C)   Resp 18   Ht 5' 3\" (1.6 m)   Wt 115.5 kg (254 lb 10.1 oz)   SpO2 92%   BMI 45.11 kg/m²       O2 Device: Room air       Temp (24hrs), Av.7 °F (36.5 °C), Min:97.5 °F (36.4 °C), Max:97.8 °F (36.6 °C)       Intake/Output:   Last shift:      No intake/output data recorded. Last 3 shifts:  1901 -  0700  In: 26828.9 [P.O.:780; I.V.:23706.9]  Out: 3225 [Urine:3225]    Intake/Output Summary (Last 24 hours) at 3/6/2019 1143  Last data filed at 3/6/2019 0528  Gross per 24 hour   Intake 9087.92 ml   Output 2250 ml   Net 6837.92 ml        Physical Exam:    Seen in Room 546. General:    Calm       Head:   Normocephalic. Dry mucous membranes    Eyes:   No icterus    . Lungs:   Clear to auscultation, No wheezes, no rales  . Heart:   Regular rate and rhythm,  No  S3 gallop. Abdomen:   Not distended.       Extremities: No significant oedema              :       Holley with bloody urine    Psych:  Seemed a bit flat    Neurologic: Responding appropriately       DATA:  Labs:  Recent Labs     03/06/19  0236 03/05/19  1752 03/05/19  0206  03/03/19  2300   * 133* 133*  134*   < > 134*   K 5.0 5.3* 5.5*  5.5*   < > 2.7*   CL 93* 99 105  104   < > 100   CO2 27 25 17*  18*   < > 21   BUN 30* 28* 24*  24*   < > 15   CREA 1.42* 1.46* 1.41*  1.40*   < > 1.23*   CA 5.8* 5.7* 6.2*  6.2*   < > 8.5   ALB  --   --  2.8*  2.8*  --  3.9   PHOS 7.0*  --  7.9*  --  4.4   MG  --   --  3.6*  --  2.2    < > = values in this interval not displayed.      Recent Labs     03/06/19  1003 03/06/19  0236 03/03/19  2300   WBC  --  11.9* 13.6*   HGB 14.8 14.5 13.8   HCT 43.7 44.1 41.4   PLT  --  278 256     Recent Labs     03/05/19  1345   VICKI 28   CREAU 56.90       Total time spent with patient:       Care Plan discussed with:      D/W Family , Ever Townsend MD

## 2019-03-06 NOTE — PROGRESS NOTES
Patient complained of tingling in face and arms again, MD made aware on dayshift per off going nurse. Patient now complained of blurred vision. MD notified, no new order want to continue to monitor.

## 2019-03-06 NOTE — PROCEDURES
PICC Placement Note    PRE-PROCEDURE VERIFICATION  Correct Procedure: yes  Correct Site:  yes  Temperature: Temp: 98.1 °F (36.7 °C), Temperature Source: Temp Source: Oral  Recent Labs     03/06/19  0236   BUN 30*   CREA 1.42*      WBC 11.9*     Allergies: Compazine [prochlorperazine edisylate]; Midol [acetaminophen-pamabrom]; Zofran [ondansetron hcl (pf)]; Aspirin; Compazine [prochlorperazine edisylate]; Contrast agent [iodine]; Ibuprofen; Motrin [ibuprofen]; and Zofran [ondansetron hcl (pf)]  Education materials, including PICC Booklet, for PICC Care given to patient: yes. See Patient Education activity for further details. PROCEDURE DETAIL  A triple lumen PICC line was started for vascular access. The following documentation is in addition to the PICC properties in the lines/airways flowsheet :  Lot #: [id number]  Was xylocaine 1% used intradermally:  yes  Catheter Length: 43 at 1 cm elsa (cm)  Vein Selection for PICC:right basilic  Central Line Bundle followed yes  Complication Related to Insertion: none/ Pulled to 1 cm elsa to get positive blood return all ports. The placement was verified by ECG/Sapiens technology: The  tip location is on the right side and the tip is in the  superior vena cava. See ECG results for PICC tip placement. Report given to nurse Evelyn Toledo. Line is okay to use.     Julian Ruvalcaba RN

## 2019-03-06 NOTE — PROGRESS NOTES
Bedside and Verbal shift change report given to Heaven (oncoming nurse) by Geoff Garcia (offgoing nurse). Report included the following information SBAR, Kardex and MAR.

## 2019-03-06 NOTE — CONSULTS
NEUROLOGY CONSULT NOTE    Name Rafael Hankins Age 34 y.o. MRN [id number]  1990     Consulting Physician: Yina Barfield MD      Chief Complaint:  Myalgia, generalized weakness, hyperCKemia     Assessment:     · Principal Problem:  ·   Rhabdomyolysis (2016)  ·   · Active Problems:  ·   Hypokalemia (3/4/2019)  ·   ·   34year old AAF with a h/o recurrent rhabdomyolysis since age 25 (third episode this admission) presenting with hyperCKemia (>761k), rhabdomyolysis, myalgias, proximal LE>>UE weakness on examination. +FHx maternal recurrent rhabodomyolysis. ? Metabolic vs inflammatory myopathy. JAMARCUS negative this admission. TSH low 0.3.   laboratory investigations reviewed with negative Jo1, ESR/CRP, ANCA. Recommendations:   Advise proceeding with muscle biopsy proximal LE/thigh  Free T3/T4, CRP, ESR  Outpatient EMG Myopathy protocol and consideration for genetic testing/metabolic myopathy-rhabdomyolysis panel  Please arrange for Neurology F/U 2-3 weeks post discharge    Thank you very much for this referral. I appreciate the opportunity to participate in this patient's care. History of Present Illness: This is a 34 y.o.   female, we were asked to see for hyperCKemia, generalized weakness, myalgias. PMH notable for recurrent hyperCKemia/rhabdomyolysis since age 25. Positive FHx recurrent rhabdomyolysis in her mother and maternal grandfather. She presents currently with severely elevated CK >761k, evidence of rhabdomyolysis, significant myalgias, LE>UE generalized weakness. She denies SOB or h/o cardiomyopathy. No reported diplopia/ptosis. She endorsed some transient paresthesias of the face/b/l UE and LE today which appeared to correct after metabolic derangements were addressed. She admits to recent exposure to a weight loss supplement \"thermofight-x\". She reports normal developmental milestones. No prior muscle biopsy or EMG.   JAMARCUS negative this admission. TSH low 0.3.  2016 laboratory investigations reviewed with negative Jo1, ESR/CRP, ANCA. Allergies   Allergen Reactions    Compazine [Prochlorperazine Edisylate] Anaphylaxis     stroke    Midol [Acetaminophen-Pamabrom] Anaphylaxis     dont take again because of rabdo    Zofran [Ondansetron Hcl (Pf)] Nausea and Vomiting    Aspirin Other (comments)     Never take again because of rabdo    Compazine [Prochlorperazine Edisylate] Other (comments)     Stroke symptoms    Contrast Agent [Iodine] Other (comments)     Kidney problems    Ibuprofen Other (comments)     Kidney problem,rhabdo      Motrin [Ibuprofen] Nausea and Vomiting    Zofran [Ondansetron Hcl (Pf)] Nausea and Vomiting        Prior to Admission medications    Medication Sig Start Date End Date Taking? Authorizing Provider   OTHER Thermofight X, weight loss supplement   Yes Provider, Historical       Past Medical History:   Diagnosis Date    Other ill-defined conditions(799.89)     rhabdo    Rhabdomyolysis     Traumatic rhabdomyolysis ([facility] [address] 5/12/2016        History reviewed. No pertinent surgical history. Social History     Tobacco Use    Smoking status: Current Some Day Smoker    Smokeless tobacco: Never Used   Substance Use Topics    Alcohol use: No     Comment: rare        Family History   Problem Relation Age of Onset    Diabetes Father     Diabetes Maternal Grandmother     Diabetes Maternal Grandfather         Review of Systems:   Comprehensive review of systems performed and negative except for as listed above.      Exam:     Visit Vitals  /75 (BP 1 Location: Right arm, BP Patient Position: At rest)   Pulse 76   Temp 98.1 °F (36.7 °C)   Resp 18   Ht 5' 3\" (1.6 m)   Wt 115.5 kg (254 lb 10.1 oz)   SpO2 96%   BMI 45.11 kg/m²        General: Patient in no apparent distress   Head: Normocephalic, atraumatic, anicteric sclera   Lungs:  Clear to auscultation bilaterally, No wheezes or rubs   Cardiac: Regular rate and rhythm with no murmurs. Abd: Bowel sounds were audible. No tenderness on palpation   Ext: No pedal edema   Skin: No overt signs of rash     Neurological Exam:  Mental Status: Alert and oriented to person place and time   Speech: Fluent no aphasia or dysarthria. Cranial Nerves:   Intact visual fields. Facial sensation is normal. Facial movement is symmetric. Palate is midline. Normal sternocleidomastoid strength. Tongue is midline. Hearing is intact bilaterally. Eyes: PERRL, EOM's full, no nystagmus, no ptosis. Motor:  UE 5-/5 b/l, LE prox 2/5 b/l and distal 4+/5 b/l. Normal bulk and tone. Reflexes:   Deep tendon reflexes trace throughout. Plantar response is downgoing b/l. Sensory:   Symmetrically intact  with no perceived deficits modalities involving small or large fibers. Gait:  Gait is deferred 2/2 weakness   Tremor:   No tremor noted. Cerebellar:  No ataxia   Neurovascular: No carotid bruits. Imaging  CT Results (maximum last 3): No results found for this or any previous visit. MRI Results (maximum last 3): No results found for this or any previous visit. Lab Review  Lab Results   Component Value Date/Time    WBC 11.9 (H) 03/06/2019 02:36 AM    HCT 43.7 03/06/2019 10:03 AM    HGB 14.8 03/06/2019 10:03 AM    PLATELET 455 49/88/1245 02:36 AM     Lab Results   Component Value Date/Time    Sodium 131 (L) 03/06/2019 02:36 AM    Potassium 5.0 03/06/2019 02:36 AM    Chloride 93 (L) 03/06/2019 02:36 AM    CO2 27 03/06/2019 02:36 AM    Glucose 97 03/06/2019 02:36 AM    BUN 30 (H) 03/06/2019 02:36 AM    Creatinine 1.42 (H) 03/06/2019 02:36 AM    Calcium 5.8 (LL) 03/06/2019 02:36 AM     No components found for: TROPQUANT  No results found for: JAMARCUS    Signed:  Olesya Macias.  Rachael Thomas DO  3/6/2019  1:59 PM

## 2019-03-06 NOTE — PROGRESS NOTES
CM noted of transfer to this floor. Previous cm noted that patient does not have insurance or PCP, she has been given the [facility] information. CM will follow for any other discharge needs.     Kacey Sorto [facility]

## 2019-03-06 NOTE — PROGRESS NOTES
1062 - Bedside and Verbal shift change report given to Eva Sexton RN (oncoming nurse) by Tyrell Oglesby RN (offgoing nurse). Report included the following information SBAR, Kardex, Intake/Output, MAR, Accordion and Recent Results. 1929 - Paged Dr. Sadaf Leyva to make sure she is aware of today's lab results and discuss possible need for higher level of care. 200 - Second page to Dr. Sadaf Leyva. Pt very anxious at this time, states \"I feel like I'm going to die. \" Prn morphine given. 3828 Marisa Cruz NP returned page for Dr. Sadaf Leyva and states she is managing case. Pt now requesting RRT be called for chest pressure and numbness in her tongue, Sunita CARPNETER made aware. 5420 - RRT at bedside. Nesha Gutierrez NP and Dr. Sadaf Leyva at bedside, made aware of critical value ionized calcium at 0.59. Discussed meds given overnight, specifically calcium gluconate given at 2034. Discussed recent results, including calcium of 5.8 this morning at 0236. EKG completed at bedside d/t chest pain. Pt states her tongue is no longer numb. Plan per Dr. Sadaf Leyva and Nesha Gutierrez NP is to change pain management to dilaudid prn with prn narcan in case of over sedation and initiate remote cardiac monitoring due to continued low calcium. Nesha Gutierrez NP states she will call Dr. Mary Carmen Sena, nephrologist, to discuss calcium repletion. 1033 - Attempted to draw labs, only able to get one tube despite three attempts to draw blood. Pt very lethargic, states she is to tired to verify her name and date of birth. Verified pt identifiers with pt's wristband and pt's . Pt states she wants her pain medicine and doesn't want to feel any pain. Pt advised that pain medication administration must be balanced with her level of consciousness and respiratory drive. 200 - Pt's mother at bedside, states that she has had rhabo in the past and also has had an allergic reaction to dilaudid in the past and expressed concern that the pt might get it.     1100 - NP Erica at bedside, spoke with pt's  and mother. NP states ok to give dilaudid as ordered. NP made aware of issues with 0230 CK not having enough blood to get result, and not being able to draw 1030 CK recheck. NP states she will follow up regarding PICC or central line insertion. 1107 - Prn dilaudid given, first dose. 36 - NP Erica spoke with Dr June Bazzi, nephrology, and got ok for PICC line insertion, vascular access team made aware. 1138 - Pt assigned to room 357 for transfer to telemetry bed. Attempted to call report, RN unable to take report at this time. Receiving RN to call back. 1150 - TRANSFER - OUT REPORT:    [facility] (name) on [address]  being transferred to [address] [place] [address] (unit) for change in patient condition(needs closer monitoring)       Report consisted of patients Situation, Background, Assessment and   Recommendations(SBAR). Information from the following report(s) SBAR, Kardex, Intake/Output, MAR, Accordion, Recent Results and Med Rec Status was reviewed with the receiving nurse.     Lines:   Peripheral IV 03/03/19 Left Antecubital (Active)   Site Assessment Clean 3/5/2019  9:56 PM   Phlebitis Assessment 0 3/5/2019  9:56 PM   Infiltration Assessment 0 3/5/2019  9:56 PM   Dressing Status Clean, dry, & intact 3/5/2019  9:56 PM   Dressing Type Transparent 3/5/2019  9:56 PM   Hub Color/Line Status Infusing 3/5/2019  9:56 PM   Action Taken Open ports on tubing capped 3/5/2019  9:56 PM   Alcohol Cap Used Yes 3/5/2019  9:56 PM       Peripheral IV 03/05/19 Left;Posterior Hand (Active)   Site Assessment Clean, dry, & intact 3/5/2019  9:56 PM   Phlebitis Assessment 0 3/5/2019  9:56 PM   Infiltration Assessment 0 3/5/2019  9:56 PM   Dressing Status Clean 3/5/2019  9:56 PM   Dressing Type Transparent 3/5/2019  9:56 PM   Hub Color/Line Status Clotted 3/5/2019  9:56 PM   Alcohol Cap Used Yes 3/5/2019  9:56 PM      Opportunity for questions and clarification was provided.  - Vascular access team RN called, states they will insert the PICC line when the pt arrives to  [id number] - Pt transported to  357 with her belongings, chart, meds, and two RNs.

## 2019-03-06 NOTE — PROGRESS NOTES
1600- assumed care of pt. Spoke with lab to add on additional testing as ordered by neuro. Pt states she has no pain, relaxed and laying in the bed with family at the bedside.

## 2019-03-06 NOTE — PROGRESS NOTES
Spoke with patient and mother about scheduling muscle biopsy. Pt is still not sure she wants to do it. They are still \"thinking about it\"  Will follow up tomorrow and Friday, if necessary.

## 2019-03-07 LAB
ANION GAP SERPL CALC-SCNC: 12 MMOL/L (ref 5–15)
ANION GAP SERPL CALC-SCNC: 14 MMOL/L (ref 5–15)
ANION GAP SERPL CALC-SCNC: 8 MMOL/L (ref 5–15)
APPEARANCE UR: ABNORMAL
BACTERIA URNS QL MICRO: NEGATIVE /HPF
BILIRUB UR QL CFM: NEGATIVE
BUN SERPL-MCNC: 24 MG/DL (ref 6–20)
BUN SERPL-MCNC: 25 MG/DL (ref 6–20)
BUN SERPL-MCNC: 27 MG/DL (ref 6–20)
BUN/CREAT SERPL: 21 (ref 12–20)
BUN/CREAT SERPL: 21 (ref 12–20)
BUN/CREAT SERPL: 24 (ref 12–20)
CALCIUM SERPL-MCNC: 5.3 MG/DL (ref 8.5–10.1)
CALCIUM SERPL-MCNC: 5.6 MG/DL (ref 8.5–10.1)
CALCIUM SERPL-MCNC: <5 MG/DL (ref 8.5–10.1)
CHLORIDE SERPL-SCNC: 87 MMOL/L (ref 97–108)
CHLORIDE SERPL-SCNC: 89 MMOL/L (ref 97–108)
CHLORIDE SERPL-SCNC: 95 MMOL/L (ref 97–108)
CK SERPL-CCNC: ABNORMAL U/L (ref 26–192)
CO2 SERPL-SCNC: 28 MMOL/L (ref 21–32)
CO2 SERPL-SCNC: 29 MMOL/L (ref 21–32)
CO2 SERPL-SCNC: 30 MMOL/L (ref 21–32)
COLOR UR: ABNORMAL
COMMENT, HOLDF: NORMAL
CREAT SERPL-MCNC: 1.04 MG/DL (ref 0.55–1.02)
CREAT SERPL-MCNC: 1.17 MG/DL (ref 0.55–1.02)
CREAT SERPL-MCNC: 1.27 MG/DL (ref 0.55–1.02)
EPITH CASTS URNS QL MICRO: ABNORMAL /LPF
GLUCOSE SERPL-MCNC: 103 MG/DL (ref 65–100)
GLUCOSE SERPL-MCNC: 103 MG/DL (ref 65–100)
GLUCOSE SERPL-MCNC: 120 MG/DL (ref 65–100)
GLUCOSE UR STRIP.AUTO-MCNC: NEGATIVE MG/DL
HGB UR QL STRIP: ABNORMAL
HYALINE CASTS URNS QL MICRO: ABNORMAL /LPF (ref 0–5)
KETONES UR QL STRIP.AUTO: NEGATIVE MG/DL
LEUKOCYTE ESTERASE UR QL STRIP.AUTO: NEGATIVE
NITRITE UR QL STRIP.AUTO: POSITIVE
PH UR STRIP: 6 [PH] (ref 5–8)
PHOSPHATE SERPL-MCNC: 7 MG/DL (ref 2.6–4.7)
POTASSIUM SERPL-SCNC: 4.7 MMOL/L (ref 3.5–5.1)
POTASSIUM SERPL-SCNC: 5 MMOL/L (ref 3.5–5.1)
POTASSIUM SERPL-SCNC: 5.1 MMOL/L (ref 3.5–5.1)
PROT UR STRIP-MCNC: NEGATIVE MG/DL
RBC #/AREA URNS HPF: ABNORMAL /HPF (ref 0–5)
SAMPLES BEING HELD,HOLD: NORMAL
SODIUM SERPL-SCNC: 130 MMOL/L (ref 136–145)
SODIUM SERPL-SCNC: 131 MMOL/L (ref 136–145)
SODIUM SERPL-SCNC: 131 MMOL/L (ref 136–145)
SP GR UR REFRACTOMETRY: 1.02 (ref 1–1.03)
URATE SERPL-MCNC: 10.6 MG/DL (ref 2.6–6)
UROBILINOGEN UR QL STRIP.AUTO: 1 EU/DL (ref 0.2–1)
WBC URNS QL MICRO: ABNORMAL /HPF (ref 0–4)

## 2019-03-07 PROCEDURE — 74011000258 HC RX REV CODE- 258: Performed by: NURSE PRACTITIONER

## 2019-03-07 PROCEDURE — 36415 COLL VENOUS BLD VENIPUNCTURE: CPT

## 2019-03-07 PROCEDURE — 80048 BASIC METABOLIC PNL TOTAL CA: CPT

## 2019-03-07 PROCEDURE — 97530 THERAPEUTIC ACTIVITIES: CPT | Performed by: PHYSICAL THERAPIST

## 2019-03-07 PROCEDURE — 65660000000 HC RM CCU STEPDOWN

## 2019-03-07 PROCEDURE — 84550 ASSAY OF BLOOD/URIC ACID: CPT

## 2019-03-07 PROCEDURE — 84100 ASSAY OF PHOSPHORUS: CPT

## 2019-03-07 PROCEDURE — 74011250636 HC RX REV CODE- 250/636: Performed by: NURSE PRACTITIONER

## 2019-03-07 PROCEDURE — 97162 PT EVAL MOD COMPLEX 30 MIN: CPT | Performed by: PHYSICAL THERAPIST

## 2019-03-07 PROCEDURE — 82550 ASSAY OF CK (CPK): CPT

## 2019-03-07 PROCEDURE — 81001 URINALYSIS AUTO W/SCOPE: CPT

## 2019-03-07 PROCEDURE — 74011250636 HC RX REV CODE- 250/636: Performed by: HOSPITALIST

## 2019-03-07 PROCEDURE — 74011250636 HC RX REV CODE- 250/636: Performed by: INTERNAL MEDICINE

## 2019-03-07 PROCEDURE — 74011000258 HC RX REV CODE- 258: Performed by: INTERNAL MEDICINE

## 2019-03-07 RX ORDER — ALLOPURINOL 300 MG/1
300 TABLET ORAL DAILY
Status: DISCONTINUED | OUTPATIENT
Start: 2019-03-08 | End: 2019-03-07

## 2019-03-07 RX ADMIN — HYDROMORPHONE HYDROCHLORIDE 1 MG: 1 INJECTION, SOLUTION INTRAMUSCULAR; INTRAVENOUS; SUBCUTANEOUS at 13:04

## 2019-03-07 RX ADMIN — HYDROMORPHONE HYDROCHLORIDE 1 MG: 1 INJECTION, SOLUTION INTRAMUSCULAR; INTRAVENOUS; SUBCUTANEOUS at 17:56

## 2019-03-07 RX ADMIN — HYDROMORPHONE HYDROCHLORIDE 1 MG: 1 INJECTION, SOLUTION INTRAMUSCULAR; INTRAVENOUS; SUBCUTANEOUS at 06:36

## 2019-03-07 RX ADMIN — CALCIUM GLUCONATE 2 G: 98 INJECTION, SOLUTION INTRAVENOUS at 09:45

## 2019-03-07 RX ADMIN — SODIUM CHLORIDE 250 ML/HR: 900 INJECTION, SOLUTION INTRAVENOUS at 19:51

## 2019-03-07 RX ADMIN — ONDANSETRON HYDROCHLORIDE 4 MG: 2 INJECTION INTRAMUSCULAR; INTRAVENOUS at 10:59

## 2019-03-07 RX ADMIN — HYDROMORPHONE HYDROCHLORIDE 1 MG: 1 INJECTION, SOLUTION INTRAMUSCULAR; INTRAVENOUS; SUBCUTANEOUS at 23:32

## 2019-03-07 RX ADMIN — Medication 10 ML: at 20:40

## 2019-03-07 RX ADMIN — Medication 10 ML: at 06:36

## 2019-03-07 RX ADMIN — ONDANSETRON HYDROCHLORIDE 4 MG: 2 INJECTION INTRAMUSCULAR; INTRAVENOUS at 01:58

## 2019-03-07 RX ADMIN — SODIUM CHLORIDE 250 ML/HR: 900 INJECTION, SOLUTION INTRAVENOUS at 10:56

## 2019-03-07 RX ADMIN — SODIUM CHLORIDE 250 ML/HR: 900 INJECTION, SOLUTION INTRAVENOUS at 06:42

## 2019-03-07 RX ADMIN — SODIUM CHLORIDE 250 ML/HR: 900 INJECTION, SOLUTION INTRAVENOUS at 15:46

## 2019-03-07 RX ADMIN — HYDROMORPHONE HYDROCHLORIDE 1 MG: 1 INJECTION, SOLUTION INTRAMUSCULAR; INTRAVENOUS; SUBCUTANEOUS at 03:18

## 2019-03-07 RX ADMIN — HYDROMORPHONE HYDROCHLORIDE 1 MG: 1 INJECTION, SOLUTION INTRAMUSCULAR; INTRAVENOUS; SUBCUTANEOUS at 20:39

## 2019-03-07 RX ADMIN — HYDROMORPHONE HYDROCHLORIDE 1 MG: 1 INJECTION, SOLUTION INTRAMUSCULAR; INTRAVENOUS; SUBCUTANEOUS at 09:45

## 2019-03-07 RX ADMIN — SODIUM CHLORIDE 250 ML/HR: 900 INJECTION, SOLUTION INTRAVENOUS at 02:51

## 2019-03-07 RX ADMIN — CALCIUM GLUCONATE 2 G: 94 INJECTION, SOLUTION INTRAVENOUS at 03:15

## 2019-03-07 RX ADMIN — HYDROMORPHONE HYDROCHLORIDE 1 MG: 1 INJECTION, SOLUTION INTRAMUSCULAR; INTRAVENOUS; SUBCUTANEOUS at 00:33

## 2019-03-07 RX ADMIN — ENOXAPARIN SODIUM 40 MG: 100 INJECTION SUBCUTANEOUS at 10:56

## 2019-03-07 RX ADMIN — ONDANSETRON HYDROCHLORIDE 4 MG: 2 INJECTION INTRAMUSCULAR; INTRAVENOUS at 06:36

## 2019-03-07 RX ADMIN — HYDROMORPHONE HYDROCHLORIDE 1 MG: 1 INJECTION, SOLUTION INTRAMUSCULAR; INTRAVENOUS; SUBCUTANEOUS at 15:46

## 2019-03-07 NOTE — PROGRESS NOTES
Problem: Pressure Injury - Risk of  Goal: *Prevention of pressure injury  Document Jacky Scale and appropriate interventions in the flowsheet. Outcome: Progressing Towards Goal  Pressure Injury Interventions:  Sensory Interventions: Assess changes in LOC, Minimize linen layers    Moisture Interventions: Absorbent underpads    Activity Interventions: Pressure redistribution bed/mattress(bed type)    Mobility Interventions: Turn and reposition approx. every two hours(pillow and wedges), HOB 30 degrees or less, Float heels    Nutrition Interventions: Document food/fluid/supplement intake    Friction and Shear Interventions: Lift sheet, Minimize layers               Problem: Falls - Risk of  Goal: *Absence of Falls  Document Alicia Fall Risk and appropriate interventions in the flowsheet.   Outcome: Progressing Towards Goal  Fall Risk Interventions:       Mentation Interventions: Adequate sleep, hydration, pain control, Door open when patient unattended, Family/sitter at bedside, Update white board    Medication Interventions: Patient to call before getting OOB, Teach patient to arise slowly    Elimination Interventions: Patient to call for help with toileting needs, Call light in reach

## 2019-03-07 NOTE — PROGRESS NOTES
0158: patient c/o nausea, see MAR.    0202: Critical lab result reported and read back, Calcium 5.3, hospitalist paged, awaiting call back. 0221: Answering service called back to page MD.     Dano Leiva: Telephone order read back and verified from MD, order placed. See MAR    0431: Critical lab result reported and read back, ,840.     6281: patient c/o nausea, see MAR.

## 2019-03-07 NOTE — ROUTINE PROCESS
Bedside and Verbal shift change report given to Cassia Griffin (oncoming nurse) by Farhan Smith (offgoing nurse). Report included the following information SBAR, Kardex, Intake/Output, MAR, Recent Results, Med Rec Status and Cardiac Rhythm NSR.

## 2019-03-07 NOTE — PROGRESS NOTES
Daily Progress Note  Micheal Fontaine General Surgery at [address] Date: 3/3/2019    Subjective:     Last 24 hrs: Still undecided if she wants to proceed with muscle biopsy. Per Hospitalist: Monday is next opportunity that samples can be obtained due to lab processing requirements. Note that sample site would be LE, thigh. Discussed procedure, use of local anesthesia/conscious sedation. Site would be proximal LE/thigh per Neurology. Objective:     Blood pressure 110/79, pulse 74, temperature 97.4 °F (36.3 °C), resp. rate 16, height 5' 3\" (1.6 m), weight 114.4 kg (252 lb 3.3 oz), SpO2 95 %. Temp (24hrs), Av.6 °F (36.4 °C), Min:97.4 °F (36.3 °C), Max:98.1 °F (36.7 °C)      _____________________  Physical Exam:     Alert and Oriented, lying in bed, no acute distress. Cardiovascular: RRR, no peripheral edema  Lungs:CTAB       Assessment:   Principal Problem:    Rhabdomyolysis (2016)    Active Problems:    Hypokalemia (3/4/2019)            Plan: Will continue to f/u with patient about performing muscle biopsy while she is here. Next possible date for procedure would be Monday, March 10. Site would be proximal LE/thigh.         Bj Iyer, [facility] General Surgery at [place] [address],  [facility], [address] [place] [address], [facility]  [phone]    Data Review:    Recent Labs     19  1003 19  0236   WBC  --  11.9*   HGB 14.8 14.5   HCT 43.7 44.1   PLT  --  278     Recent Labs     19  0321 19  2238 19  1353 19  0236  19  0206   * 131* 132* 131*   < > 133*  134*   K 5.0 5.1 4.9 5.0   < > 5.5*  5.5*   CL 89* 87* 91* 93*   < > 105  104   CO2 29 30 31 27   < > 17*  18*   * 103* 98 97   < > 96  95   BUN 24* 27* 28* 30*   < > 24*  24*   CREA 1.17* 1.27* 1.33* 1.42*   < > 1.41*  1.40*   CA <5.0* 5.3* 5.6* 5.8*   < > 6.2*  6.2*   MG  --   --   --   --   --  3.6*   PHOS 7.0*  --   --  7.0*  --  7.9*   ALB  --   --   --   --   -- 2. 8*  2.8*   TBILI  --   --   --   --   --  0.3  0.3   SGOT  --   --   --   --   --  >2,000*  >2,000*   ALT  --   --   --   --   --  607*  613*    < > = values in this interval not displayed. No results for input(s): AML, LPSE in the last 72 hours.         ______________________  Medications:    Current Facility-Administered Medications   Medication Dose Route Frequency    calcium gluconate 2 g in 0.9% sodium chloride 100 mL IVPB  2 g IntraVENous ONCE    HYDROmorphone (PF) (DILAUDID) injection 1 mg  1 mg IntraVENous Q2H PRN    0.9% sodium chloride infusion  250 mL/hr IntraVENous CONTINUOUS    sevelamer carbonate (RENVELA) tab 800 mg  800 mg Oral TID WITH MEALS    calcium carbonate (TUMS) chewable tablet 400 mg [elemental]  400 mg Oral TID WITH MEALS    sodium chloride (NS) flush 5-40 mL  5-40 mL IntraVENous Q8H    sodium chloride (NS) flush 5-40 mL  5-40 mL IntraVENous PRN    enoxaparin (LOVENOX) injection 40 mg  40 mg SubCUTAneous Q24H    oxyCODONE IR (ROXICODONE) tablet 5 mg  5 mg Oral Q4H PRN    acetaminophen (TYLENOL) tablet 500 mg  500 mg Oral Q6H PRN    ondansetron (ZOFRAN) injection 4 mg  4 mg IntraVENous Q4H PRN

## 2019-03-07 NOTE — PROGRESS NOTES
The serum bicarb is above 30 and the corrected calcium is below target. Will d/c the bicarb infusion and start N/Saline .

## 2019-03-07 NOTE — PROGRESS NOTES
Problem: Mobility Impaired (Adult and Pediatric)  Goal: *Acute Goals and Plan of Care (Insert Text)  Physical Therapy Goals  Initiated 3/7/2019  1. Patient will move from supine to sit and sit to supine  in bed with modified independence within 7 day(s). 2.  Patient will transfer from bed to chair and chair to bed with CGA using the least restrictive device within 7 day(s). 3.  Patient will perform sit to stand with contact guard assist within 7 day(s). 4.  Patient will ambulate with minimal assistance for 25 feet with the least restrictive device within 7 day(s). physical Therapy EVALUATION  Patient: Shaggy Patrick (34 y.o. female)  Date: 3/7/2019  Primary Diagnosis: Rhabdomyolysis [M62.82]       Precautions:   Fall    ASSESSMENT :  Based on the objective data described below, the patient presents with decreased functional mobility from baseline level of function. Prior to admit patient was independent with mobility at baseline. Lives with  and 3year old step son. Works at [employer] as manager. Currently needs maxA x 2 for supine and overall not putting forth much effort to assist.  Sits on EOB with good balance and able to scoot to EOB with Mane and extra time. Sit to stand x 2 with maxA x 2 and only able to stand less than 5 seconds each trial.  Needs maxA x 2 to return to supine and totalA x 2 to scoot to [facility]. In current condition patient will likely need inpt rehab. As medical condition improves she may be appropriate to DC home pending mobility progress. Patient will benefit from skilled intervention to address the above impairments.   Patients rehabilitation potential is considered to be Good  Factors which may influence rehabilitation potential include:   [x]         None noted  []         Mental ability/status  []         Medical condition  []         Home/family situation and support systems  []         Safety awareness  []         Pain tolerance/management  [] Other: PLAN :  Recommendations and Planned Interventions:  [x]           Bed Mobility Training             [x]    Neuromuscular Re-Education  [x]           Transfer Training                   []    Orthotic/Prosthetic Training  [x]           Gait Training                         []    Modalities  [x]           Therapeutic Exercises           []    Edema Management/Control  [x]           Therapeutic Activities            [x]    Patient and Family Training/Education  []           Other (comment):    Frequency/Duration: Patient will be followed by physical therapy  5 times a week to address goals. Discharge Recommendations: Inpatient Rehab  Further Equipment Recommendations for Discharge: TBD     SUBJECTIVE:   Patient stated Ivanna Senior are we doing here?     OBJECTIVE DATA SUMMARY:   HISTORY:    Past Medical History:   Diagnosis Date    Other ill-defined conditions(799.89)     rhabdo    Rhabdomyolysis     Traumatic rhabdomyolysis ([facility] [address] 5/12/2016   History reviewed. No pertinent surgical history. Prior Level of Function/Home Situation: Independent with mobility at baseline  Personal factors and/or comorbidities impacting plan of care:     Home Situation  Home Environment: Apartment  # Steps to Enter: 0  One/Two Story Residence: One story  Living Alone: No  Support Systems: Spouse/Significant Other/Partner  Current DME Used/Available at Home: None    EXAMINATION/PRESENTATION/DECISION MAKING:   Critical Behavior:  Neurologic State: Alert  Orientation Level: Oriented X4  Cognition: Follows commands     Hearing:   Auditory  Auditory Impairment: None    Range Of Motion:  AROM: Generally decreased, functional                       Strength:    Strength: Generally decreased, functional                    Tone & Sensation:                                  Coordination:     Vision:      Functional Mobility:  Bed Mobility:  Rolling: Maximum assistance;Assist x1  Supine to Sit: Maximum assistance;Assist x2  Sit to Supine: Maximum assistance;Assist x2  Scooting: Minimum assistance; Additional time;Assist x1  Transfers:  Sit to Stand: Maximum assistance;Assist x2  Stand to Sit: Maximum assistance;Assist x2                       Balance:   Sitting: Intact; With support  Standing: Impaired; With support  Standing - Static: Constant support;Poor  Standing - Dynamic : Poor  Ambulation/Gait Training:         Functional Measure:  Barthel Index:    Bathin  Bladder: 0  Bowels: 5  Groomin  Dressin  Feedin  Mobility: 0  Stairs: 0  Toilet Use: 0  Transfer (Bed to Chair and Back): 5  Total: 20/100       Percentage of impairment   0%   1-19%   20-39%   40-59%   60-79%   80-99%   100%   Barthel Score 0-100 100 99-80 79-60 59-40 20-39 1-19   0     The Barthel ADL Index: Guidelines  1. The index should be used as a record of what a patient does, not as a record of what a patient could do. 2. The main aim is to establish degree of independence from any help, physical or verbal, however minor and for whatever reason. 3. The need for supervision renders the patient not independent. 4. A patient's performance should be established using the best available evidence. Asking the patient, friends/relatives and nurses are the usual sources, but direct observation and common sense are also important. However direct testing is not needed. 5. Usually the patient's performance over the preceding 24-48 hours is important, but occasionally longer periods will be relevant. 6. Middle categories imply that the patient supplies over 50 per cent of the effort. 7. Use of aids to be independent is allowed. Miguelina Alvarez., Barthel, D.W. (4672). Functional evaluation: the Barthel Index. [address] [facility] (14)2. Ritu Busing lakshmi Karthikeyan, [email or web address].F, Beth Salvador., Scooby Ochoa., Irena, [facility] (). Measuring the change indisability after inpatient rehabilitation; comparison of the responsiveness of the Barthel Index and Functional [place] Measure.  Journal of Neurology, Neurosurgery, and Psychiatry, 664), 620-458. BERNA Valdez, LANIE Calixto, & Sterling Barrow M.A. (2004.) Assessment of post-stroke quality of life in cost-effectiveness studies: The usefulness of the Barthel Index and the EuroQoL-5D. Quality of Life Research, 13, 427-43         Pain:  Pain Scale 1: Numeric (0 - 10)  Pain Intensity 1: 5           Pain Intervention(s) 1: Refused  Activity Tolerance:   VSS  Please refer to the flowsheet for vital signs taken during this treatment. After treatment:   []         Patient left in no apparent distress sitting up in chair  [x]         Patient left in no apparent distress in bed  [x]         Call bell left within reach  [x]         Nursing notified  [x]         Caregiver present  []         Bed alarm activated    COMMUNICATION/EDUCATION:   The patients plan of care was discussed with: Physical Therapist, Occupational Therapist and Registered Nurse. [x]         Fall prevention education was provided and the patient/caregiver indicated understanding. [x]         Patient/family have participated as able in goal setting and plan of care. [x]         Patient/family agree to work toward stated goals and plan of care. []         Patient understands intent and goals of therapy, but is neutral about his/her participation. []         Patient is unable to participate in goal setting and plan of care.     Thank you for this referral.  Carmelo Luz, PT, DPT   Time Calculation: 18 mins

## 2019-03-07 NOTE — PROGRESS NOTES
Name: Leonor Cary MRN: [id number]   : 1990 [facility]   Date: 3/7/2019        IMPRESSION:   Acute Kidney Injury - Sec to Rhabdo --> with likely ATN  Met Acidosis -sec to ITZ  Hyperphosphatemia - sec to Rhabdo  Hyperkalemia - resolved  Hypocalcemia - asymptomatic this morning  Hyponatremia - mild - likely sec to ITZ with impaired free water clearance  Proteinuria - sec to Rhabdo      PLAN:   Her GFR is stable. Continue hydration with Normal Saline @ 250 mls/min. There is no sign of fluid overload on exam.  Replete calcium (oral + IV) cautiously. Watch for rebound hypercalcemia. Follow lytes q 8 for now. OK for a PICC. There is no history of CKD. Continue sevelamer. Appreciate Neuro recs      Subjective/Interval History:       3/6/19  - F/U ITZ      The events were reviewed and discussed with Sunita(NP).  Ms [place] reported myalgia and muscle cramps. There were no new GI symptoms. 3/7/19    F/U ITZ , Rhabdo      Felt better today. Myalgia / Soreness was no worse. Facial Spasm was not an issue. She was switched to N/Saline overnight. Objective:   Vital Signs:    Visit Vitals  /79 (BP 1 Location: Right arm, BP Patient Position: At rest)   Pulse 74   Temp 97.4 °F (36.3 °C)   Resp 16   Ht 5' 3\" (1.6 m)   Wt 114.4 kg (252 lb 3.3 oz)   SpO2 95%   BMI 44.68 kg/m²       O2 Device: Room air       Temp (24hrs), Av.6 °F (36.4 °C), Min:97.4 °F (36.3 °C), Max:98.1 °F (36.7 °C)       Intake/Output:   Last shift:      701 -  190  In: -   Out: 200 [Urine:200]  Last 3 shifts: 1901 -  07  In: 5374.2 [I.V.:5374.2]  Out: 5200 [Urine:5200]    Intake/Output Summary (Last 24 hours) at 3/7/2019 1102  Last data filed at 3/7/2019 0800  Gross per 24 hour   Intake 1015.83 ml   Output 3850 ml   Net -2834.17 ml        Physical Exam:        Seen in Room 357. Her Nurse was in attendance. General:    Calm       Head:   Normocephalic.  Dry mucous membranes    Eyes:   No icterus    Lungs:   Clear to auscultation, No wheezes, no rales    Heart:   Regular rate and rhythm,  No  S3 gallop. Abdomen:   Not distended. Extremities: No leg  oedema              :       Holley with bloody urine (improving)    Psych:  A bit flat    Neurologic: Responding appropriately       DATA:  Labs:  Recent Labs     03/07/19  0321 03/06/19  2238 03/06/19  1353 03/06/19  0236  03/05/19  0206   * 131* 132* 131*   < > 133*  134*   K 5.0 5.1 4.9 5.0   < > 5.5*  5.5*   CL 89* 87* 91* 93*   < > 105  104   CO2 29 30 31 27   < > 17*  18*   BUN 24* 27* 28* 30*   < > 24*  24*   CREA 1.17* 1.27* 1.33* 1.42*   < > 1.41*  1.40*   CA <5.0* 5.3* 5.6* 5.8*   < > 6.2*  6.2*   ALB  --   --   --   --   --  2.8*  2.8*   PHOS 7.0*  --   --  7.0*  --  7.9*   MG  --   --   --   --   --  3.6*    < > = values in this interval not displayed.      Recent Labs     03/06/19  1003 03/06/19  0236   WBC  --  11.9*   HGB 14.8 14.5   HCT 43.7 44.1   PLT  --  278     Recent Labs     03/05/19  1345   VICKI 28   CREAU 56.90       Total time spent with patient:       Care Plan discussed with:      Patient  Wm Cali MD

## 2019-03-07 NOTE — PROGRESS NOTES
Hospitalist Progress Note  Cher Goldmann, NP  Answering service: [phone] from in house phone  Cell: [phone]   Date of Service:  3/7/2019  NAME:  Soo Rouse  :  1990  MRN:  [id number]    Admission Summary:   Pt presented to the ED with generalized body aches and soreness with any movement. She has a hx of rhabdomyolysis x 2 other times in her life. Reported a mild cold but denied ay strenuous activity or heavy exertion. She hasn't started any new prescription meds, but started taking a weight loss pill called \"thermofight-x\" ~ 1 week PTA. She also reported she noticed her urine had turned a dark brown color. Due to her hx of rhabdo, she noted these symptoms and came to the ED with expectation of the diagnosis. Interval history / Subjective:   Pt feeling better today - noted she is moving more and not having any twitching of her face or cramping of her hands. She reports her pain is controlled and she is trying to move a bit more - ammenable to working with PT today. She remains stiff and does have muscle pain but it is better. Assessment & Plan:     Rhabdomyolysis:  - etiology is uncertain. - significant hypokalemia which could precipitate rhabdo. She also started taking a weight-loss pill called Thermofight-X which contains multiple ingredients including chromium and a \"proprietery blend. \"   - UDS negative and denies any sort of illicit drug  - CK now >900,000  And holding - trending  - fluids changed to NS today  - monitor electrolytes  - pain now controlled - dilaudid appears to be working better.   - TSH 0.30; JAMARCUS negative  - pt has declined an in-patient muscle biopsy again - has not yet decided to proceed with this. Neurology has seen and encouraged it to be done. If it can not be done in am, preferences is to have done on Monday due to testing time frames.  Encouraged her to decide and let us know if she wants to do this on Monday.     Transaminitis:   - elevated AST likely due to muscle breakdown, ALT elevation probably due to mild liver damage from rhabdo    Hypokalemia/Hyperkalemia: normalized    Hypocalcemia:   - persists and continue to replace  - repleting calcium based on correct calcium (goal corrected 7.5-8.0)  - pt is not having symptoms today    ITZ:   - creatinine improved today, 1.17  - nephrology following  - Holley placed 3/4 due to inability to void as well as increasing CK and need to better monitor output from a renal standpoint - urine is brown/red  - strict I/O: Input again not accurate as she is getting at least 250 mL/hr of fluids (6L/day). Output 3.6 L    Code status:Full  DVT prophylaxis: Lovenox  Care Plan discussed with: patient, nurse and attending MD  Disposition: home when able     Hospital Problems  Date Reviewed: 5/12/2016          Codes Class Noted POA    Hypokalemia ICD-10-CM: E87.6  ICD-9-CM: 276.8  3/4/2019 Yes        * (Principal) Rhabdomyolysis ICD-10-CM: U28.36  ICD-9-CM: 728.88  5/4/2016 Yes            Review of Systems:   Denies HA. Denies chest pain or pressure. No SOB or other respiraotry complaints. No abdominal complaints other than a poor appetite. + stiffness and soreness to her extremities    Vital Signs:    Last 24hrs VS reviewed since prior progress note. Most recent are:  Visit Vitals  /79 (BP 1 Location: Right arm, BP Patient Position: At rest)   Pulse 74   Temp 97.4 °F (36.3 °C)   Resp 16   Ht 5' 3\" (1.6 m)   Wt 114.4 kg (252 lb 3.3 oz)   SpO2 95%   BMI 44.68 kg/m²       Intake/Output Summary (Last 24 hours) at 3/7/2019 0931  Last data filed at 3/7/2019 0800  Gross per 24 hour   Intake 1015.83 ml   Output 3850 ml   Net -2834.17 ml      Physical Examination:         Constitutional:  Cooperative, pleasant. Very flat   ENT:  Oral MM moist, oropharynx benign. Resp:  CTA bilaterally. No wheezing. No accessory muscle use and on RA. CV:  Regular rhythm, normal rate. No murmurs. GI:  Obese. Soft, non distended, non tender. Normoactive bowel sounds. + Nausea and vomiting today    Musculoskeletal:  generalized nonpitting edema, warm, 2+ pulses throughout. Neurologic:  Moves all extremities but slowly and with pain - more spontaneous movement today and would like to work with PT. AAOx3. Sensation is intact. Lines: Holley present, draining red/brown urine   PICC: Placed 3/6 (right)      Data Review:   Labs Reviewed. Labs:     Recent Labs     03/06/19  1003 03/06/19  0236   WBC  --  11.9*   HGB 14.8 14.5   HCT 43.7 44.1   PLT  --  278     Recent Labs     03/07/19  0321 03/06/19  2238 03/06/19  1353 03/06/19  0236  03/05/19  0206   * 131* 132* 131*   < > 133*  134*   K 5.0 5.1 4.9 5.0   < > 5.5*  5.5*   CL 89* 87* 91* 93*   < > 105  104   CO2 29 30 31 27   < > 17*  18*   BUN 24* 27* 28* 30*   < > 24*  24*   CREA 1.17* 1.27* 1.33* 1.42*   < > 1.41*  1.40*   * 103* 98 97   < > 96  95   CA <5.0* 5.3* 5.6* 5.8*   < > 6.2*  6.2*   MG  --   --   --   --   --  3.6*   PHOS 7.0*  --   --  7.0*  --  7.9*    < > = values in this interval not displayed. Recent Labs     03/05/19  0206   SGOT >2,000*  >2,000*   *  613*   AP 71  69   TBILI 0.3  0.3   TP 6.9  7.0   ALB 2.8*  2.8*   GLOB 4.1*  4.2*     No results for input(s): INR, PTP, APTT in the last 72 hours. No lab exists for component: INREXT, INREXT   No results for input(s): FE, TIBC, PSAT, FERR in the last 72 hours. No results found for: FOL, RBCF   No results for input(s): PH, PCO2, PO2 in the last 72 hours. Recent Labs     03/07/19  0321 03/06/19  2238 03/06/19  1353 03/06/19  0236   ,200* 995,840* 949,600* QUANTITY NOT SUFFICIENT.  SUGGEST RECOLLECTION SANJUANITA AT 0959/KRL   CKNDX  --   --   --  Cannot be calculated   TROIQ  --   --   --  <0.05     No results found for: CHOL, CHOLX, CHLST, CHOLV, HDL, LDL, LDLC, DLDLP, TGLX, TRIGL, TRIGP, CHHD, CHHDX  Lab Results   Component Value Date/Time    Glucose (POC) 92 04/28/2013 11:56 AM     Lab Results   Component Value Date/Time    Color RED 03/07/2019 05:09 AM    Appearance TURBID (A) 03/07/2019 05:09 AM    Specific gravity 1.022 03/07/2019 05:09 AM    Specific gravity 1.025 03/04/2019 12:14 AM    pH (UA) 6.0 03/07/2019 05:09 AM    Protein NEGATIVE  03/07/2019 05:09 AM    Glucose NEGATIVE  03/07/2019 05:09 AM    Ketone NEGATIVE  03/07/2019 05:09 AM    Bilirubin NEGATIVE  05/26/2016 07:42 PM    Urobilinogen 1.0 03/07/2019 05:09 AM    Nitrites POSITIVE (A) 03/07/2019 05:09 AM    Leukocyte Esterase NEGATIVE  03/07/2019 05:09 AM    Epithelial cells FEW 03/07/2019 05:09 AM    Bacteria NEGATIVE  03/07/2019 05:09 AM    WBC 0-4 03/07/2019 05:09 AM    RBC 5-10 03/07/2019 05:09 AM     Medications Reviewed:     Current Facility-Administered Medications   Medication Dose Route Frequency    calcium gluconate 2 g in 0.9% sodium chloride 100 mL IVPB  2 g IntraVENous ONCE    HYDROmorphone (PF) (DILAUDID) injection 1 mg  1 mg IntraVENous Q2H PRN    0.9% sodium chloride infusion  250 mL/hr IntraVENous CONTINUOUS    sevelamer carbonate (RENVELA) tab 800 mg  800 mg Oral TID WITH MEALS    calcium carbonate (TUMS) chewable tablet 400 mg [elemental]  400 mg Oral TID WITH MEALS    sodium chloride (NS) flush 5-40 mL  5-40 mL IntraVENous Q8H    sodium chloride (NS) flush 5-40 mL  5-40 mL IntraVENous PRN    enoxaparin (LOVENOX) injection 40 mg  40 mg SubCUTAneous Q24H    oxyCODONE IR (ROXICODONE) tablet 5 mg  5 mg Oral Q4H PRN    acetaminophen (TYLENOL) tablet 500 mg  500 mg Oral Q6H PRN    ondansetron (ZOFRAN) injection 4 mg  4 mg IntraVENous Q4H PRN   ______________________________________________________________________  EXPECTED LENGTH OF STAY: 3d 4h  ACTUAL LENGTH OF STAY:          3               Luis Hines NP

## 2019-03-08 ENCOUNTER — ANESTHESIA EVENT (OUTPATIENT)
Dept: SURGERY | Age: 29
DRG: 500 | End: 2019-03-08
Payer: SELF-PAY

## 2019-03-08 LAB
ANION GAP SERPL CALC-SCNC: 11 MMOL/L (ref 5–15)
ANION GAP SERPL CALC-SCNC: 13 MMOL/L (ref 5–15)
BUN SERPL-MCNC: 21 MG/DL (ref 6–20)
BUN SERPL-MCNC: 21 MG/DL (ref 6–20)
BUN/CREAT SERPL: 23 (ref 12–20)
BUN/CREAT SERPL: 26 (ref 12–20)
CALCIUM SERPL-MCNC: 5.4 MG/DL (ref 8.5–10.1)
CALCIUM SERPL-MCNC: 6.4 MG/DL (ref 8.5–10.1)
CHLORIDE SERPL-SCNC: 104 MMOL/L (ref 97–108)
CHLORIDE SERPL-SCNC: 98 MMOL/L (ref 97–108)
CK SERPL-CCNC: ABNORMAL U/L (ref 26–192)
CO2 SERPL-SCNC: 22 MMOL/L (ref 21–32)
CO2 SERPL-SCNC: 23 MMOL/L (ref 21–32)
CREAT SERPL-MCNC: 0.8 MG/DL (ref 0.55–1.02)
CREAT SERPL-MCNC: 0.9 MG/DL (ref 0.55–1.02)
GLUCOSE SERPL-MCNC: 92 MG/DL (ref 65–100)
GLUCOSE SERPL-MCNC: 96 MG/DL (ref 65–100)
PHOSPHATE SERPL-MCNC: 5.4 MG/DL (ref 2.6–4.7)
POTASSIUM SERPL-SCNC: 3.3 MMOL/L (ref 3.5–5.1)
POTASSIUM SERPL-SCNC: 4.3 MMOL/L (ref 3.5–5.1)
SODIUM SERPL-SCNC: 133 MMOL/L (ref 136–145)
SODIUM SERPL-SCNC: 138 MMOL/L (ref 136–145)

## 2019-03-08 PROCEDURE — 82550 ASSAY OF CK (CPK): CPT

## 2019-03-08 PROCEDURE — 74011250636 HC RX REV CODE- 250/636: Performed by: INTERNAL MEDICINE

## 2019-03-08 PROCEDURE — 80048 BASIC METABOLIC PNL TOTAL CA: CPT

## 2019-03-08 PROCEDURE — 65660000000 HC RM CCU STEPDOWN

## 2019-03-08 PROCEDURE — 36415 COLL VENOUS BLD VENIPUNCTURE: CPT

## 2019-03-08 PROCEDURE — 74011250636 HC RX REV CODE- 250/636: Performed by: HOSPITALIST

## 2019-03-08 PROCEDURE — 74011250636 HC RX REV CODE- 250/636: Performed by: FAMILY MEDICINE

## 2019-03-08 PROCEDURE — 74011250636 HC RX REV CODE- 250/636: Performed by: NURSE PRACTITIONER

## 2019-03-08 PROCEDURE — 74011000250 HC RX REV CODE- 250: Performed by: FAMILY MEDICINE

## 2019-03-08 PROCEDURE — 84100 ASSAY OF PHOSPHORUS: CPT

## 2019-03-08 RX ORDER — SODIUM CHLORIDE 0.9 % (FLUSH) 0.9 %
20 SYRINGE (ML) INJECTION AS NEEDED
Status: DISCONTINUED | OUTPATIENT
Start: 2019-03-08 | End: 2019-03-18 | Stop reason: HOSPADM

## 2019-03-08 RX ORDER — SODIUM CHLORIDE 0.9 % (FLUSH) 0.9 %
10 SYRINGE (ML) INJECTION AS NEEDED
Status: DISCONTINUED | OUTPATIENT
Start: 2019-03-08 | End: 2019-03-18 | Stop reason: HOSPADM

## 2019-03-08 RX ORDER — SODIUM CHLORIDE 0.9 % (FLUSH) 0.9 %
10 SYRINGE (ML) INJECTION EVERY 8 HOURS
Status: DISCONTINUED | OUTPATIENT
Start: 2019-03-08 | End: 2019-03-18 | Stop reason: HOSPADM

## 2019-03-08 RX ORDER — SODIUM CHLORIDE 0.9 % (FLUSH) 0.9 %
10 SYRINGE (ML) INJECTION EVERY 24 HOURS
Status: DISCONTINUED | OUTPATIENT
Start: 2019-03-08 | End: 2019-03-18 | Stop reason: HOSPADM

## 2019-03-08 RX ADMIN — SODIUM CHLORIDE 250 ML/HR: 900 INJECTION, SOLUTION INTRAVENOUS at 09:27

## 2019-03-08 RX ADMIN — Medication 10 ML: at 23:53

## 2019-03-08 RX ADMIN — ALTEPLASE 1 MG: 2.2 INJECTION, POWDER, LYOPHILIZED, FOR SOLUTION INTRAVENOUS at 04:35

## 2019-03-08 RX ADMIN — Medication 10 ML: at 14:03

## 2019-03-08 RX ADMIN — ONDANSETRON HYDROCHLORIDE 4 MG: 2 INJECTION INTRAMUSCULAR; INTRAVENOUS at 02:40

## 2019-03-08 RX ADMIN — SODIUM CHLORIDE 250 ML/HR: 900 INJECTION, SOLUTION INTRAVENOUS at 14:08

## 2019-03-08 RX ADMIN — HYDROMORPHONE HYDROCHLORIDE 1 MG: 1 INJECTION, SOLUTION INTRAMUSCULAR; INTRAVENOUS; SUBCUTANEOUS at 18:14

## 2019-03-08 RX ADMIN — HYDROMORPHONE HYDROCHLORIDE 1 MG: 1 INJECTION, SOLUTION INTRAMUSCULAR; INTRAVENOUS; SUBCUTANEOUS at 23:52

## 2019-03-08 RX ADMIN — SODIUM CHLORIDE 250 ML/HR: 900 INJECTION, SOLUTION INTRAVENOUS at 00:24

## 2019-03-08 RX ADMIN — HYDROMORPHONE HYDROCHLORIDE 1 MG: 1 INJECTION, SOLUTION INTRAMUSCULAR; INTRAVENOUS; SUBCUTANEOUS at 04:34

## 2019-03-08 RX ADMIN — HYDROMORPHONE HYDROCHLORIDE 1 MG: 1 INJECTION, SOLUTION INTRAMUSCULAR; INTRAVENOUS; SUBCUTANEOUS at 09:26

## 2019-03-08 RX ADMIN — ENOXAPARIN SODIUM 40 MG: 100 INJECTION SUBCUTANEOUS at 09:26

## 2019-03-08 RX ADMIN — HYDROMORPHONE HYDROCHLORIDE 1 MG: 1 INJECTION, SOLUTION INTRAMUSCULAR; INTRAVENOUS; SUBCUTANEOUS at 14:02

## 2019-03-08 NOTE — PROGRESS NOTES
Pt with complaint of loose stools, requesting Immodium. MD called and made aware. Pt placed on isolation to rule out C-diff.

## 2019-03-08 NOTE — PROGRESS NOTES
Physical Therapy  3/8/2019    Cleared by RN. Pt declining all attempts at mobility despite strong encouragement, education and family motivation. Pt reports she is \"too sore all over\" to do anything. Discussed sitting up in the chair(using mechanical lift) over the weekend as therapy will not be by to see her and pt replied \"maybe. \" Recommend use of leticia lift only for all transfers as pt was Max A x 2 on initial evaluation and only maintained standing for seconds and also had a fall yesterday evening.     Thank Claudine Charles, PT, DPT

## 2019-03-08 NOTE — PROGRESS NOTES
Bedside and Verbal shift change report given to [address] [place] [address] (oncoming nurse) by Jesse Meléndez (offgoing nurse). Report included the following information SBAR, MAR and Cardiac Rhythm Sinus rhythm.

## 2019-03-08 NOTE — PROGRESS NOTES
Pt feels better today overall. Still having pain \"all over\". She has agreed to muscle biopsy of L thigh. To be done on Monday, 3/11 by Dr Cortez Carrion. Reviewed technical aspects of procedure with patient's mother (patient asleep). She agrees with the plan. All questions answered.

## 2019-03-08 NOTE — PROGRESS NOTES
Pt reportedly had a ground level fall tonight. Pt seen and evaluated. Pt notes that she and her family member attempt to self ambulate to the bedside commode without nursing assistance. PE:  GEN-NAD  PSYCH-A&Ox3  NEURO- GCS 15. Moves all extremities x 4. No slurred speech. No facial droop. Sensation grossly intact. No pronator drift. HEENT-NCAT/pupils 2 mm reactive bilateral. Oropharynx clear. NECK-supple, trachea midline  LUNGS-CTA B  HEART-RRR  ABD-soft, NT,ND,  BS. No R/G/R  VASCULAR-2+ radial/1+DP pulses bilateral. No C/C/E  SKIN-warm/dry  MS-no calf tenderness    A/P:  S/p fall. No acute injury noted. Place on fall precautions and neurovascular checks.

## 2019-03-08 NOTE — PROGRESS NOTES
Name: Stefan Qureshi MRN: [id number]   : 1990 Hospital: . [address]   Date: 3/8/2019        IMPRESSION:   · Severe rhabdomyolysis with CPK above 1 mln. · ITZ- stable, non oliguric  · Hemoglobinuria from Rhabdo. · Multiple electrolytes abnormalities  ·   Hypocalcemia- currently asymptomatic. ·   Hyperphosphatemia- on PO4 binders      PLAN:   · Continue present care and monitor electrolytes. · Muscle biopsy is planned for Monday. Subjective/Interval History:   I have reviewed the flowsheet and previous days notes. ROS:Pertinent items are noted in HPI. Patient feels better. She had her pain meds. Objective:   Vital Signs:    Visit Vitals  /85 (BP 1 Location: Right arm, BP Patient Position: At rest) Comment (BP 1 Location): forearm   Pulse 84   Temp 97.8 °F (36.6 °C)   Resp 16   Ht 5' 3\" (1.6 m)   Wt 116.1 kg (255 lb 15.3 oz)   SpO2 95%   BMI 45.34 kg/m²       O2 Device: Room air       Temp (24hrs), Av.6 °F (36.4 °C), Min:97.3 °F (36.3 °C), Max:98 °F (36.7 °C)       Intake/Output:   Last shift:       07 -  1900  In: 4170.8 [P.O.:200; I.V.:3970.8]  Out: 200 [Urine:200]  Last 3 shifts:  1901 -  0700  In: 4837.5 [P.O.:400; I.V.:4437.5]  Out: 4375 [Urine:4375]    Intake/Output Summary (Last 24 hours) at 3/8/2019 1120  Last data filed at 3/8/2019 0800  Gross per 24 hour   Intake 6600 ml   Output 1450 ml   Net 5150 ml        Physical Exam:  General:    Alert, cooperative, no distress, appears stated age. Sleepy. Head:   Normocephalic, without obvious abnormality, atraumatic. Eyes:   Conjunctivae/corneas clear. Lungs:   Clear to auscultation bilaterally. No Wheezing or Rhonchi. No rales. Chest wall:  No tenderness or deformity. No Accessory muscle use. Heart:   Regular rate and rhythm,  no murmur, rub or gallop. Abdomen:   Soft, non-tender. Not distended. Bowel sounds normal. No masses.  Holley cath is in draining brownish urine.  Extremities: Extremities normal, atraumatic, No cyanosis. No edema. No clubbing  Skin:     Texture, turgor normal. No rashes or lesions. Not Jaundiced  Psych:  Good insight. Not depressed. Not anxious or agitated. Neurologic: Normal strength, Alert and oriented X 3. DATA:  Labs:  Recent Labs     03/08/19  0000 03/07/19  1417 03/07/19  0321  03/06/19  0236   * 131* 130*   < > 131*   K 4.3 4.7 5.0   < > 5.0   CL 98 95* 89*   < > 93*   CO2 22 28 29   < > 27   BUN 21* 25* 24*   < > 30*   CREA 0.90 1.04* 1.17*   < > 1.42*   CA 5.4* 5.6* <5.0*   < > 5.8*   PHOS 5.4*  --  7.0*  --  7.0*    < > = values in this interval not displayed.      Recent Labs     03/06/19  1003 03/06/19  0236   WBC  --  11.9*   HGB 14.8 14.5   HCT 43.7 44.1   PLT  --  278     Recent Labs     03/05/19  1345   VICKI 28   CREAU 56.90       Total time spent with patient:  35 minutes    [] Critical Care Provided    Care Plan discussed with:   Family, Medical Team    Dannielle Catalan MD

## 2019-03-08 NOTE — PROGRESS NOTES
Ms Lepe's serum calcium is improving. She no longer has symptomatic hypocalcemia . Calcium supplementation should be approached cautiously . She may be prone to \" rebound hypercalcemia \". Continue the present course. Follow shannan.

## 2019-03-08 NOTE — PROGRESS NOTES
Hospitalist Progress Note  Alyce Singh NP  Answering service: [phone] from in house phone  Cell: [phone]   Date of Service:  3/8/2019  NAME:  Lisa Angulo  :  1990  MRN:  [id number]    Admission Summary:   Pt presented to the ED with generalized body aches and soreness with any movement. She has a hx of rhabdomyolysis x 2 other times in her life. Reported a mild cold but denied ay strenuous activity or heavy exertion. She hasn't started any new prescription meds, but started taking a weight loss pill called \"thermofight-x\" ~ 1 week PTA. She also reported she noticed her urine had turned a dark brown color. Due to her hx of rhabdo, she noted these symptoms and came to the ED with expectation of the diagnosis. Interval history / Subjective:   Pt in bed sleeping, friend or family at bedside. Pt had a \"fall\" last night after she tried to get up to the bathroom. No injuries documented. Pt was counseled on not getting up unassisted. She was unable to move much or walk with PT yesterday. Pt has a Very Flat affect and does not engage in conversation much. Attempted to  about her mobility and increased needs for assistance at this time and PT will be important for her to work on. Wanting to drink and gulping soda and water then becoming nauseated and throwing up. Would like to try Gatorade - will send message to diet office    Removed Zofran from allergy list as she has been taking without incident. Assessment & Plan:     Rhabdomyolysis:  - etiology is uncertain. - significant hypokalemia on admit which could precipitate rhabdo. She also started taking a weight-loss pill called Thermofight-X which contains multiple ingredients including chromium and a \"proprietery blend. \"   - UDS negative and denies any sort of illicit drug   - CK now 1.6 mil  - trending these (they are still trending up) ?  Increased more steeply due to fall last pm? No sharp increase in pain - this is more controlled. - fluids continue, NS @ 250 mL/hr  - monitoring electrolytes  - pain controlled - dilaudid appears to be working better.   - TSH 0.30; JAMARCUS negative  - pt has been declining muscle biopsy but has agreed to it for Monday 3/11.  - pt needs to be NPO after midnight Sunday and hold lovenox Sunday. - no s/s fluid overload - do acknowledge pt I>>>O. Weight is up     Transaminitis:   - elevated AST likely due to muscle breakdown, ALT elevation probably due to mild liver damage from rhabdo  - Check CMP in am    Hypokalemia/Hyperkalemia: normalized    Hypocalcemia:   - persists and only should give IV replacement IF patient is symptomatic. She is on oral replacement and need to be cautious of rebound hyper calcemia. Have discussed this with nephrology. - Serum Ca 5.4 this am, asymptomatic    ITZ:   - creatinine normalized, 0.90  - nephrology following  - Holley placed 3/4 due to inability to void as well as increasing CK and need to better monitor output from a renal standpoint - urine is brown/red  - strict I/O: Input not accurate as she is getting at least 250 mL/hr of fluids (6L/day). Loose Stool:   - c diff ordered last pm but she does not meet criteria so orders and precautions cancelled  - no fever, no leukocytosis, no abdominal pain/tenderness    Code status:Full  DVT prophylaxis: Lovenox  Care Plan discussed with: patient, nurse. Disposition: home when able    Started working with PT yesterday. Addendum: spoke with pts mother over the phone and updated on plan of care. Hospital Problems  Date Reviewed: 5/12/2016          Codes Class Noted POA    Hypokalemia ICD-10-CM: E87.6  ICD-9-CM: 276.8  3/4/2019 Yes        * (Principal) Rhabdomyolysis ICD-10-CM: Q60.76  ICD-9-CM: 728.88  5/4/2016 Yes            Review of Systems:   Denies HA. Denies chest pain or pressure. No SOB or other respiratory complaints.  Poor appetite and nausea, loose stool. + stiffness and soreness to her extremities    Vital Signs:    Last 24hrs VS reviewed since prior progress note. Most recent are:  Visit Vitals  /82 (BP 1 Location: Left arm, BP Patient Position: At rest)   Pulse 77   Temp 98 °F (36.7 °C)   Resp 18   Ht 5' 3\" (1.6 m)   Wt (P) 116.1 kg (255 lb 15.3 oz)   SpO2 92%   BMI (P) 45.34 kg/m²       Intake/Output Summary (Last 24 hours) at 3/8/2019 8601  Last data filed at 3/7/2019 2125  Gross per 24 hour   Intake 4837.5 ml   Output 2525 ml   Net 2312.5 ml      Physical Examination:         Constitutional:  Cooperative, pleasant. Very flat   ENT:  Oral MM moist, oropharynx benign. Resp:  CTA bilaterally. No wheezing. No accessory muscle use and on RA. CV:  Regular rhythm, normal rate. No murmurs. GI:  Obese. Non distended, non tender. Normoactive bowel sounds. + Nausea at times. + BM (loose)    Musculoskeletal:  generalized nonpitting edema, warm, 2+ pulses throughout. Neurologic:  Moves all extremities but slowly, less painful. Not making much effort to move. AAOx3. Sensation is intact. Lines: Holley present, draining red/brown urine   PICC: Placed 3/6 (right)      Data Review:   Labs Reviewed. Labs:     Recent Labs     03/06/19  1003 03/06/19  0236   WBC  --  11.9*   HGB 14.8 14.5   HCT 43.7 44.1   PLT  --  278     Recent Labs     03/08/19  0000 03/07/19  1417 03/07/19  0321  03/06/19  0236   * 131* 130*   < > 131*   K 4.3 4.7 5.0   < > 5.0   CL 98 95* 89*   < > 93*   CO2 22 28 29   < > 27   BUN 21* 25* 24*   < > 30*   CREA 0.90 1.04* 1.17*   < > 1.42*   GLU 92 103* 120*   < > 97   CA 5.4* 5.6* <5.0*   < > 5.8*   PHOS 5.4*  --  7.0*  --  7.0*   URICA  --   --  10.6*  --   --     < > = values in this interval not displayed. No results for input(s): SGOT, GPT, ALT, AP, TBIL, TBILI, TP, ALB, GLOB, GGT, AML, LPSE in the last 72 hours.     No lab exists for component: AMYP, HLPSE  No results for input(s): INR, PTP, APTT in the last 72 hours. No lab exists for component: INREXT, INREXT   No results for input(s): FE, TIBC, PSAT, FERR in the last 72 hours. No results found for: FOL, RBCF   No results for input(s): PH, PCO2, PO2 in the last 72 hours. Recent Labs     03/08/19  0000 03/07/19  1417 03/07/19  0321  03/06/19  0236   CPK 8,757,582* 2,704,484* 995,200*   < > QUANTITY NOT SUFFICIENT. SUGGEST RECOLLECTION SANJUANITA AT 0959/KRL   CKNDX  --   --   --   --  Cannot be calculated   TROIQ  --   --   --   --  <0.05    < > = values in this interval not displayed.      No results found for: CHOL, CHOLX, CHLST, CHOLV, HDL, LDL, LDLC, DLDLP, TGLX, TRIGL, TRIGP, CHHD, CHHDX  Lab Results   Component Value Date/Time    Glucose (POC) 92 04/28/2013 11:56 AM     Lab Results   Component Value Date/Time    Color RED 03/07/2019 05:09 AM    Appearance TURBID (A) 03/07/2019 05:09 AM    Specific gravity 1.022 03/07/2019 05:09 AM    Specific gravity 1.025 03/04/2019 12:14 AM    pH (UA) 6.0 03/07/2019 05:09 AM    Protein NEGATIVE  03/07/2019 05:09 AM    Glucose NEGATIVE  03/07/2019 05:09 AM    Ketone NEGATIVE  03/07/2019 05:09 AM    Bilirubin NEGATIVE  05/26/2016 07:42 PM    Urobilinogen 1.0 03/07/2019 05:09 AM    Nitrites POSITIVE (A) 03/07/2019 05:09 AM    Leukocyte Esterase NEGATIVE  03/07/2019 05:09 AM    Epithelial cells FEW 03/07/2019 05:09 AM    Bacteria NEGATIVE  03/07/2019 05:09 AM    WBC 0-4 03/07/2019 05:09 AM    RBC 5-10 03/07/2019 05:09 AM     Medications Reviewed:     Current Facility-Administered Medications   Medication Dose Route Frequency    sodium chloride (NS) flush 20 mL  20 mL InterCATHeter PRN    sodium chloride (NS) flush 10 mL  10 mL InterCATHeter Q24H    sodium chloride (NS) flush 10 mL  10 mL InterCATHeter PRN    sodium chloride (NS) flush 10 mL  10 mL InterCATHeter Q8H    alteplase (CATHFLO) 1 mg in sterile water (preservative free) 1 mL injection  1 mg InterCATHeter PRN    HYDROmorphone (PF) (DILAUDID) injection 1 mg 1 mg IntraVENous Q2H PRN    0.9% sodium chloride infusion  250 mL/hr IntraVENous CONTINUOUS    sevelamer carbonate (RENVELA) tab 800 mg  800 mg Oral TID WITH MEALS    calcium carbonate (TUMS) chewable tablet 400 mg [elemental]  400 mg Oral TID WITH MEALS    sodium chloride (NS) flush 5-40 mL  5-40 mL IntraVENous Q8H    sodium chloride (NS) flush 5-40 mL  5-40 mL IntraVENous PRN    enoxaparin (LOVENOX) injection 40 mg  40 mg SubCUTAneous Q24H    oxyCODONE IR (ROXICODONE) tablet 5 mg  5 mg Oral Q4H PRN    acetaminophen (TYLENOL) tablet 500 mg  500 mg Oral Q6H PRN    ondansetron (ZOFRAN) injection 4 mg  4 mg IntraVENous Q4H PRN   ______________________________________________________________________  EXPECTED LENGTH OF STAY: 3d 4h  ACTUAL LENGTH OF STAY:          [facility], NP

## 2019-03-08 NOTE — PROGRESS NOTES
Post Fall Documentation      [address] witnessed/unwitnessed fall occurred on 3/7/19 (Date) at 2000 (Time). The answers to the following questions summarize the fall:     · In the patient's own words,:  · What was he/she doing when he/she fell? Attempting to get to bed side commode with family member to assist.     · What are his/her complaints? No complaints post fall    · Nurse:  · Document observation, treatment, conversation, follow-up, and patient response. Pt instructed to call for assistance with ambulation. · What was the patient's condition when found (i.e., pain, symptoms, cuts, bruises)? No injury post fall. · What specific complaints did the patient have? None     · What did the staff do when patient was found (i.e., vital signs, returned to bed with fall alarm, side rails up)? Vital signs, alerted MD, assisted back to bed using leticia lift. · Which physician was notified? Bertram    · Family has been notified. Witnessed by family.     Rosy Daly RN

## 2019-03-09 LAB
ALBUMIN SERPL-MCNC: 1.9 G/DL (ref 3.5–5)
ALBUMIN/GLOB SERPL: 0.5 {RATIO} (ref 1.1–2.2)
ALP SERPL-CCNC: 55 U/L (ref 45–117)
ALT SERPL-CCNC: 1409 U/L (ref 12–78)
ANION GAP SERPL CALC-SCNC: 10 MMOL/L (ref 5–15)
ANION GAP SERPL CALC-SCNC: 8 MMOL/L (ref 5–15)
AST SERPL-CCNC: >2000 U/L (ref 15–37)
BILIRUB SERPL-MCNC: 0.3 MG/DL (ref 0.2–1)
BUN SERPL-MCNC: 16 MG/DL (ref 6–20)
BUN SERPL-MCNC: 20 MG/DL (ref 6–20)
BUN/CREAT SERPL: 30 (ref 12–20)
BUN/CREAT SERPL: 42 (ref 12–20)
CALCIUM SERPL-MCNC: 5.1 MG/DL (ref 8.5–10.1)
CALCIUM SERPL-MCNC: 6.7 MG/DL (ref 8.5–10.1)
CHLORIDE SERPL-SCNC: 106 MMOL/L (ref 97–108)
CHLORIDE SERPL-SCNC: 118 MMOL/L (ref 97–108)
CK SERPL-CCNC: ABNORMAL U/L (ref 26–192)
CO2 SERPL-SCNC: 19 MMOL/L (ref 21–32)
CO2 SERPL-SCNC: 22 MMOL/L (ref 21–32)
CREAT SERPL-MCNC: 0.38 MG/DL (ref 0.55–1.02)
CREAT SERPL-MCNC: 0.67 MG/DL (ref 0.55–1.02)
GLOBULIN SER CALC-MCNC: 3.6 G/DL (ref 2–4)
GLUCOSE SERPL-MCNC: 65 MG/DL (ref 65–100)
GLUCOSE SERPL-MCNC: 76 MG/DL (ref 65–100)
PHOSPHATE SERPL-MCNC: 2.3 MG/DL (ref 2.6–4.7)
POTASSIUM SERPL-SCNC: 2.3 MMOL/L (ref 3.5–5.1)
POTASSIUM SERPL-SCNC: 2.9 MMOL/L (ref 3.5–5.1)
PROT SERPL-MCNC: 5.5 G/DL (ref 6.4–8.2)
SODIUM SERPL-SCNC: 138 MMOL/L (ref 136–145)
SODIUM SERPL-SCNC: 145 MMOL/L (ref 136–145)

## 2019-03-09 PROCEDURE — 65660000000 HC RM CCU STEPDOWN

## 2019-03-09 PROCEDURE — 80048 BASIC METABOLIC PNL TOTAL CA: CPT

## 2019-03-09 PROCEDURE — 74011000258 HC RX REV CODE- 258: Performed by: NURSE PRACTITIONER

## 2019-03-09 PROCEDURE — 84100 ASSAY OF PHOSPHORUS: CPT

## 2019-03-09 PROCEDURE — 74011250636 HC RX REV CODE- 250/636: Performed by: NURSE PRACTITIONER

## 2019-03-09 PROCEDURE — 80053 COMPREHEN METABOLIC PANEL: CPT

## 2019-03-09 PROCEDURE — 74011250636 HC RX REV CODE- 250/636: Performed by: HOSPITALIST

## 2019-03-09 PROCEDURE — 74011250636 HC RX REV CODE- 250/636: Performed by: INTERNAL MEDICINE

## 2019-03-09 PROCEDURE — 36415 COLL VENOUS BLD VENIPUNCTURE: CPT

## 2019-03-09 PROCEDURE — 82550 ASSAY OF CK (CPK): CPT

## 2019-03-09 RX ORDER — POTASSIUM CHLORIDE 7.45 MG/ML
10 INJECTION INTRAVENOUS
Status: DISCONTINUED | OUTPATIENT
Start: 2019-03-09 | End: 2019-03-09 | Stop reason: SDUPTHER

## 2019-03-09 RX ORDER — POTASSIUM CHLORIDE 7.45 MG/ML
10 INJECTION INTRAVENOUS
Status: DISCONTINUED | OUTPATIENT
Start: 2019-03-09 | End: 2019-03-09

## 2019-03-09 RX ORDER — POTASSIUM CHLORIDE 7.45 MG/ML
10 INJECTION INTRAVENOUS
Status: COMPLETED | OUTPATIENT
Start: 2019-03-09 | End: 2019-03-09

## 2019-03-09 RX ORDER — POTASSIUM CHLORIDE 7.45 MG/ML
10 INJECTION INTRAVENOUS
Status: DISPENSED | OUTPATIENT
Start: 2019-03-09 | End: 2019-03-09

## 2019-03-09 RX ORDER — POTASSIUM CHLORIDE AND SODIUM CHLORIDE 900; 300 MG/100ML; MG/100ML
INJECTION, SOLUTION INTRAVENOUS CONTINUOUS
Status: DISCONTINUED | OUTPATIENT
Start: 2019-03-09 | End: 2019-03-13

## 2019-03-09 RX ORDER — POTASSIUM CHLORIDE 750 MG/1
40 TABLET, FILM COATED, EXTENDED RELEASE ORAL DAILY
Status: DISCONTINUED | OUTPATIENT
Start: 2019-03-09 | End: 2019-03-09

## 2019-03-09 RX ADMIN — POTASSIUM CHLORIDE 10 MEQ: 10 INJECTION, SOLUTION INTRAVENOUS at 16:22

## 2019-03-09 RX ADMIN — POTASSIUM CHLORIDE 10 MEQ: 7.46 INJECTION, SOLUTION INTRAVENOUS at 11:08

## 2019-03-09 RX ADMIN — Medication 10 ML: at 14:10

## 2019-03-09 RX ADMIN — HYDROMORPHONE HYDROCHLORIDE 1 MG: 1 INJECTION, SOLUTION INTRAMUSCULAR; INTRAVENOUS; SUBCUTANEOUS at 12:25

## 2019-03-09 RX ADMIN — HYDROMORPHONE HYDROCHLORIDE 1 MG: 1 INJECTION, SOLUTION INTRAMUSCULAR; INTRAVENOUS; SUBCUTANEOUS at 17:18

## 2019-03-09 RX ADMIN — POTASSIUM CHLORIDE 10 MEQ: 7.46 INJECTION, SOLUTION INTRAVENOUS at 12:18

## 2019-03-09 RX ADMIN — HYDROMORPHONE HYDROCHLORIDE 1 MG: 1 INJECTION, SOLUTION INTRAMUSCULAR; INTRAVENOUS; SUBCUTANEOUS at 19:35

## 2019-03-09 RX ADMIN — HYDROMORPHONE HYDROCHLORIDE 1 MG: 1 INJECTION, SOLUTION INTRAMUSCULAR; INTRAVENOUS; SUBCUTANEOUS at 08:06

## 2019-03-09 RX ADMIN — SODIUM CHLORIDE 250 ML/HR: 900 INJECTION, SOLUTION INTRAVENOUS at 03:25

## 2019-03-09 RX ADMIN — ENOXAPARIN SODIUM 40 MG: 100 INJECTION SUBCUTANEOUS at 11:08

## 2019-03-09 RX ADMIN — Medication 10 ML: at 03:33

## 2019-03-09 RX ADMIN — Medication 10 ML: at 22:08

## 2019-03-09 RX ADMIN — HYDROMORPHONE HYDROCHLORIDE 1 MG: 1 INJECTION, SOLUTION INTRAMUSCULAR; INTRAVENOUS; SUBCUTANEOUS at 03:31

## 2019-03-09 RX ADMIN — Medication 10 ML: at 22:07

## 2019-03-09 RX ADMIN — POTASSIUM CHLORIDE 10 MEQ: 10 INJECTION, SOLUTION INTRAVENOUS at 19:31

## 2019-03-09 RX ADMIN — POTASSIUM CHLORIDE 10 MEQ: 7.46 INJECTION, SOLUTION INTRAVENOUS at 09:46

## 2019-03-09 RX ADMIN — SODIUM CHLORIDE AND POTASSIUM CHLORIDE: 9; 2.98 INJECTION, SOLUTION INTRAVENOUS at 18:23

## 2019-03-09 RX ADMIN — CALCIUM GLUCONATE 1 G: 98 INJECTION, SOLUTION INTRAVENOUS at 16:22

## 2019-03-09 RX ADMIN — Medication 10 ML: at 08:07

## 2019-03-09 RX ADMIN — POTASSIUM CHLORIDE 10 MEQ: 7.46 INJECTION, SOLUTION INTRAVENOUS at 08:15

## 2019-03-09 RX ADMIN — HYDROMORPHONE HYDROCHLORIDE 1 MG: 1 INJECTION, SOLUTION INTRAMUSCULAR; INTRAVENOUS; SUBCUTANEOUS at 23:53

## 2019-03-09 RX ADMIN — SODIUM CHLORIDE 250 ML/HR: 900 INJECTION, SOLUTION INTRAVENOUS at 08:14

## 2019-03-09 RX ADMIN — POTASSIUM CHLORIDE 10 MEQ: 10 INJECTION, SOLUTION INTRAVENOUS at 18:24

## 2019-03-09 NOTE — PROGRESS NOTES
Bedside and Verbal shift change report given to [address] K[address] Hankins (oncoming nurse) by Aston Oviedo (offgoing nurse). Report included the following information SBAR, Kardex, Procedure Summary, MAR, Recent Results, Med Rec Status and Cardiac Rhythm NSR.

## 2019-03-09 NOTE — PROGRESS NOTES
Bedside and Verbal shift change report given to [facility] (oncoming nurse) by Yomaira Chaudhari (offgoing nurse). Report included the following information SBAR, MAR and Cardiac Rhythm Sinus Rhythm.

## 2019-03-09 NOTE — PROGRESS NOTES
Problem: Falls - Risk of  Goal: *Absence of Falls  Document Alicia Fall Risk and appropriate interventions in the flowsheet. Outcome: Progressing Towards Goal  Fall Risk Interventions:  Mobility Interventions: Mechanical lift, Patient to call before getting OOB    Mentation Interventions: Increase mobility    Medication Interventions: Patient to call before getting OOB    Elimination Interventions: Patient to call for help with toileting needs    History of Falls Interventions: Door open when patient unattended        Problem: Pain  Goal: *Control of Pain  Outcome: Progressing Towards Goal  0806- pt complaining of 7/10 pain - gave 1mg of hydromorphone - reassessed at 0900 and pt. Sleeping. Continuing to assess pain.

## 2019-03-09 NOTE — PROGRESS NOTES
Hospitalist Progress Note  Kelin King NP  Answering service: [phone] OR [phone] from in house phone  Cell: [phone]   Date of Service:  3/9/2019  NAME:  Sheron Arias  :  1990  MRN:  [id number]    Admission Summary:   Pt presented to the ED with generalized body aches and soreness with any movement. She has a hx of rhabdomyolysis x 2 other times in her life. Reported a mild cold but denied ay strenuous activity or heavy exertion. She hasn't started any new prescription meds, but started taking a weight loss pill called \"thermofight-x\" ~ 1 week PTA. She also reported she noticed her urine had turned a dark brown color. Due to her hx of rhabdo, she noted these symptoms and came to the ED with expectation of the diagnosis. Interval history / Subjective:   No issues overnight and CK slowly trended down this morning. Still says she is very \"sore\" and refusing really to get OOB much. Very flat affect and will not discuss much and one word response answers so difficult to obtain many answers. K+ low this am and currently receiving replacement     Assessment & Plan:     Rhabdomyolysis:  - etiology is uncertain. - significant hypokalemia on admit which could precipitate rhabdo. She also started taking a weight-loss pill called Thermofight-X which contains multiple ingredients including chromium and a \"proprietery blend. \"   - UDS negative and denies any sort of illicit drug   - fluids continue, NS @ 250 mL/hr  - monitoring electrolytes  - pain controlled - dilaudid appears to be working better.   - TSH 0.30; JAMARCUS negative  - pt has been declining muscle biopsy but has agreed to it for Monday 3/11.  - pt needs to be NPO after midnight  and hold lovenox .    - ,200 today     Transaminitis:   - Need to monitor closely given gross elevation of ALT 1,409 & AST > 2,000  - Monitor daily    Hypokalemia  - Replace with 40mEq this morning -> recheck when this is completed    Hypocalcemia:   - persists and only should give IV replacement IF patient is symptomatic. She is on oral replacement and need to be cautious of rebound hyper calcemia. Have discussed this with nephrology. - Serum Ca 6.7 this am, asymptomatic    ITZ:   - creatinine normalized, 0.90  - nephrology following  - Holley placed 3/4 due to inability to void as well as increasing CK and need to better monitor output from a renal standpoint - urine is brown/red  - strict I/O    Loose Stool:   - no fever, no leukocytosis, no abdominal pain/tenderness  - Improving    Code status:Full  DVT prophylaxis: Lovenox  Care Plan discussed with: patient, nurse, sister, attending  Disposition: home when able         Hospital Problems  Date Reviewed: 5/12/2016          Codes Class Noted POA    Hypokalemia ICD-10-CM: E87.6  ICD-9-CM: 276.8  3/4/2019 Yes        * (Principal) Rhabdomyolysis ICD-10-CM: A30.76  ICD-9-CM: 728.88  5/4/2016 Yes            Review of Systems:   Denies CP or SOB, minimal appetite, some nausea but no emesis, no fevers or chills, no abdominal pain    Vital Signs:    Last 24hrs VS reviewed since prior progress note. Most recent are:  Visit Vitals  /83 (BP 1 Location: Left arm, BP Patient Position: At rest)   Pulse 94   Temp 98.1 °F (36.7 °C)   Resp 16   Ht 5' 3\" (1.6 m)   Wt 118.2 kg (260 lb 9.3 oz)   SpO2 98%   BMI 46.16 kg/m²       Intake/Output Summary (Last 24 hours) at 3/9/2019 1213  Last data filed at 3/9/2019 1110  Gross per 24 hour   Intake 2200 ml   Output 3450 ml   Net -1250 ml      Physical Examination:         Constitutional:  Cooperative, pleasant. Very flat   ENT:  Oral MM moist, oropharynx benign. Resp:  CTA bilaterally. No wheezing. No accessory muscle use and on RA. CV:  Regular rhythm, normal rate. No murmurs. GI:  Obese. Non distended, non tender. Normoactive bowel sounds. Musculoskeletal:  Generalized lower extremity edema.  +2 distal pulses x 4    Neurologic:  Moves all extremities but slowly, less painful. Not making much effort to move. AAOx3. Sensation is intact. Lines: Holley present, draining red/brown urine   PICC: Placed 3/6 (right)      Data Review:   Labs Reviewed on 3/9    Labs:     No results for input(s): WBC, HGB, HCT, PLT, HGBEXT, HCTEXT, PLTEXT, HGBEXT, HCTEXT, PLTEXT in the last 72 hours. Recent Labs     03/09/19  0321 03/08/19  1810 03/08/19  0000  03/07/19  0321    138 133*   < > 130*   K 2.9* 3.3* 4.3   < > 5.0    104 98   < > 89*   CO2 22 23 22   < > 29   BUN 20 21* 21*   < > 24*   CREA 0.67 0.80 0.90   < > 1.17*   GLU 76 96 92   < > 120*   CA 6.7* 6.4* 5.4*   < > <5.0*   PHOS 2.3*  --  5.4*  --  7.0*   URICA  --   --   --   --  10.6*    < > = values in this interval not displayed. Recent Labs     03/09/19  0321   SGOT >2,000*   ALT 1,409*   AP 55   TBILI 0.3   TP 5.5*   ALB 1.9*   GLOB 3.6     No results for input(s): INR, PTP, APTT in the last 72 hours. No lab exists for component: INREXT, INREXT   No results for input(s): FE, TIBC, PSAT, FERR in the last 72 hours. No results found for: FOL, RBCF   No results for input(s): PH, PCO2, PO2 in the last 72 hours.   Recent Labs     03/09/19  0200 03/08/19  0000 03/07/19  1417   ,200* 1,685,800* 4,879,040*     No results found for: CHOL, CHOLX, CHLST, CHOLV, HDL, LDL, LDLC, DLDLP, TGLX, TRIGL, TRIGP, CHHD, CHHDX  Lab Results   Component Value Date/Time    Glucose (POC) 92 04/28/2013 11:56 AM     Lab Results   Component Value Date/Time    Color RED 03/07/2019 05:09 AM    Appearance TURBID (A) 03/07/2019 05:09 AM    Specific gravity 1.022 03/07/2019 05:09 AM    Specific gravity 1.025 03/04/2019 12:14 AM    pH (UA) 6.0 03/07/2019 05:09 AM    Protein NEGATIVE  03/07/2019 05:09 AM    Glucose NEGATIVE  03/07/2019 05:09 AM    Ketone NEGATIVE  03/07/2019 05:09 AM    Bilirubin NEGATIVE  05/26/2016 07:42 PM    Urobilinogen 1.0 03/07/2019 05:09 AM    Nitrites POSITIVE (A) 03/07/2019 05:09 AM    Leukocyte Esterase NEGATIVE  03/07/2019 05:09 AM    Epithelial cells FEW 03/07/2019 05:09 AM    Bacteria NEGATIVE  03/07/2019 05:09 AM    WBC 0-4 03/07/2019 05:09 AM    RBC 5-10 03/07/2019 05:09 AM     Medications Reviewed:     Current Facility-Administered Medications   Medication Dose Route Frequency    potassium chloride 10 mEq in 100 ml IVPB  10 mEq IntraVENous Q1H    potassium chloride 10 mEq in 100 ml IVPB  10 mEq IntraVENous Q1H    sodium chloride (NS) flush 20 mL  20 mL InterCATHeter PRN    sodium chloride (NS) flush 10 mL  10 mL InterCATHeter Q24H    sodium chloride (NS) flush 10 mL  10 mL InterCATHeter PRN    sodium chloride (NS) flush 10 mL  10 mL InterCATHeter Q8H    alteplase (CATHFLO) 1 mg in sterile water (preservative free) 1 mL injection  1 mg InterCATHeter PRN    HYDROmorphone (PF) (DILAUDID) injection 1 mg  1 mg IntraVENous Q2H PRN    0.9% sodium chloride infusion  250 mL/hr IntraVENous CONTINUOUS    calcium carbonate (TUMS) chewable tablet 400 mg [elemental]  400 mg Oral TID WITH MEALS    sodium chloride (NS) flush 5-40 mL  5-40 mL IntraVENous Q8H    sodium chloride (NS) flush 5-40 mL  5-40 mL IntraVENous PRN    enoxaparin (LOVENOX) injection 40 mg  40 mg SubCUTAneous Q24H    oxyCODONE IR (ROXICODONE) tablet 5 mg  5 mg Oral Q4H PRN    acetaminophen (TYLENOL) tablet 500 mg  500 mg Oral Q6H PRN    ondansetron (ZOFRAN) injection 4 mg  4 mg IntraVENous Q4H PRN   ______________________________________________________________________  EXPECTED LENGTH OF STAY: 3d 4h  ACTUAL LENGTH OF STAY:          5               Cassandra A Macel Gitelman, NP

## 2019-03-09 NOTE — PROGRESS NOTES
Called by RN and K+ 2.3 after 40mEq IV KCL. NS running at 250ml/hr. Discussed with Dr. Cony Mendez and will give 4 additional runs of KCL (40mEq) and change IVF to add some potassium. Additionally, Ca back at 5.1 and RN notes patient refusing to take any PO meds, including calcium. Ordered 1g Calcium Gluconate now. Labs ordered for tomorrow morning. Will follow closely. On tele.     Lynn Angulo DNP, ACNP-BC  Hospitalist

## 2019-03-09 NOTE — PROGRESS NOTES
Name: Skinny Rider MRN: [id number]   : 1990 [facility]   Date: 3/9/2019        IMPRESSION:   · Severe rhabdomyolysis with CPK 96,000  · ITZ- stable, non oliguric  · Hemoglobinuria from Rhabdo. · Multiple electrolytes abnormalities  ·   Hypocalcemia- currently asymptomatic. ·   Hypokalemia:       PLAN:   · Continue present care and monitor electrolytes. · Continue on IVF [email or web address] ml/hr  · Give KCL 40 meq IV X1.  · Muscle biopsy is planned for Monday. Subjective/Interval History:   I have reviewed the flowsheet and previous days notes. ROS:Pertinent items are noted in HPI. Patient feels better. She had her pain meds. Objective:   Vital Signs:    Visit Vitals  /83 (BP 1 Location: Left arm, BP Patient Position: At rest)   Pulse 94   Temp 98.1 °F (36.7 °C)   Resp 16   Ht 5' 3\" (1.6 m)   Wt 118.2 kg (260 lb 9.3 oz)   SpO2 98%   BMI 46.16 kg/m²       O2 Device: Room air       Temp (24hrs), Av.9 °F (36.6 °C), Min:97.6 °F (36.4 °C), Max:98.1 °F (36.7 °C)       Intake/Output:   Last shift:       07 -  190  In: -   Out: 700 [Urine:700]  Last 3 shifts:  1901 -  0700  In: 6370.8 [P.O.:400; I.V.:5970.8]  Out: 3850 [Urine:3850]    Intake/Output Summary (Last 24 hours) at 3/9/2019 1129  Last data filed at 3/9/2019 1110  Gross per 24 hour   Intake 2200 ml   Output 3450 ml   Net -1250 ml        Physical Exam:  General:    Alert, cooperative, no distress, appears stated age. Sleepy. Head:   Normocephalic, without obvious abnormality, atraumatic. Eyes:   Conjunctivae/corneas clear. Lungs:   Clear to auscultation bilaterally. No Wheezing or Rhonchi. No rales. Chest wall:  No tenderness or deformity. No Accessory muscle use. Heart:   Regular rate and rhythm,  no murmur, rub or gallop. Abdomen:   Soft, non-tender. Not distended. Bowel sounds normal. No masses. Holley cath is in draining brownish urine.   Extremities: Extremities normal, atraumatic, No cyanosis. No edema. No clubbing  Skin:     Texture, turgor normal. No rashes or lesions. Not Jaundiced  Psych:  Good insight. Not depressed. Not anxious or agitated. Neurologic: Normal strength, Alert and oriented X 3. DATA:  Labs:  Recent Labs     03/09/19  0321 03/08/19  1810 03/08/19  0000  03/07/19  0321    138 133*   < > 130*   K 2.9* 3.3* 4.3   < > 5.0    104 98   < > 89*   CO2 22 23 22   < > 29   BUN 20 21* 21*   < > 24*   CREA 0.67 0.80 0.90   < > 1.17*   CA 6.7* 6.4* 5.4*   < > <5.0*   ALB 1.9*  --   --   --   --    PHOS 2.3*  --  5.4*  --  7.0*    < > = values in this interval not displayed. No results for input(s): WBC, HGB, HCT, PLT, HGBEXT, HCTEXT, PLTEXT, HGBEXT, HCTEXT, PLTEXT in the last 72 hours. No results for input(s): VICKI, KU, CLU, CREAU in the last 72 hours.     No lab exists for component: PROU    Total time spent with patient:  35 minutes    [] Critical Care Provided    Care Plan discussed with:   Family, Medical Team    Zeny Fletcher MD

## 2019-03-10 LAB
ANION GAP SERPL CALC-SCNC: 11 MMOL/L (ref 5–15)
ANION GAP SERPL CALC-SCNC: 9 MMOL/L (ref 5–15)
BUN SERPL-MCNC: 21 MG/DL (ref 6–20)
BUN SERPL-MCNC: 21 MG/DL (ref 6–20)
BUN/CREAT SERPL: 30 (ref 12–20)
BUN/CREAT SERPL: 30 (ref 12–20)
CALCIUM SERPL-MCNC: 8.2 MG/DL (ref 8.5–10.1)
CALCIUM SERPL-MCNC: 8.5 MG/DL (ref 8.5–10.1)
CHLORIDE SERPL-SCNC: 108 MMOL/L (ref 97–108)
CHLORIDE SERPL-SCNC: 109 MMOL/L (ref 97–108)
CK SERPL-CCNC: ABNORMAL U/L (ref 26–192)
CO2 SERPL-SCNC: 21 MMOL/L (ref 21–32)
CO2 SERPL-SCNC: 21 MMOL/L (ref 21–32)
CREAT SERPL-MCNC: 0.69 MG/DL (ref 0.55–1.02)
CREAT SERPL-MCNC: 0.71 MG/DL (ref 0.55–1.02)
GLUCOSE SERPL-MCNC: 87 MG/DL (ref 65–100)
GLUCOSE SERPL-MCNC: 98 MG/DL (ref 65–100)
MAGNESIUM SERPL-MCNC: 1.9 MG/DL (ref 1.6–2.4)
PHOSPHATE SERPL-MCNC: 1.3 MG/DL (ref 2.6–4.7)
POTASSIUM SERPL-SCNC: 3.5 MMOL/L (ref 3.5–5.1)
POTASSIUM SERPL-SCNC: 3.5 MMOL/L (ref 3.5–5.1)
SODIUM SERPL-SCNC: 139 MMOL/L (ref 136–145)
SODIUM SERPL-SCNC: 140 MMOL/L (ref 136–145)

## 2019-03-10 PROCEDURE — 74011250636 HC RX REV CODE- 250/636: Performed by: NURSE PRACTITIONER

## 2019-03-10 PROCEDURE — 65660000000 HC RM CCU STEPDOWN

## 2019-03-10 PROCEDURE — 82550 ASSAY OF CK (CPK): CPT

## 2019-03-10 PROCEDURE — 74011250636 HC RX REV CODE- 250/636: Performed by: HOSPITALIST

## 2019-03-10 PROCEDURE — 84100 ASSAY OF PHOSPHORUS: CPT

## 2019-03-10 PROCEDURE — 83735 ASSAY OF MAGNESIUM: CPT

## 2019-03-10 PROCEDURE — 74011250636 HC RX REV CODE- 250/636: Performed by: INTERNAL MEDICINE

## 2019-03-10 PROCEDURE — 36415 COLL VENOUS BLD VENIPUNCTURE: CPT

## 2019-03-10 PROCEDURE — 80048 BASIC METABOLIC PNL TOTAL CA: CPT

## 2019-03-10 PROCEDURE — 74011000250 HC RX REV CODE- 250: Performed by: INTERNAL MEDICINE

## 2019-03-10 RX ORDER — POTASSIUM CHLORIDE 14.9 MG/ML
10 INJECTION INTRAVENOUS
Status: DISCONTINUED | OUTPATIENT
Start: 2019-03-10 | End: 2019-03-10

## 2019-03-10 RX ADMIN — SODIUM CHLORIDE AND POTASSIUM CHLORIDE: 9; 2.98 INJECTION, SOLUTION INTRAVENOUS at 14:43

## 2019-03-10 RX ADMIN — SODIUM CHLORIDE AND POTASSIUM CHLORIDE: 9; 2.98 INJECTION, SOLUTION INTRAVENOUS at 01:43

## 2019-03-10 RX ADMIN — SODIUM CHLORIDE AND POTASSIUM CHLORIDE: 9; 2.98 INJECTION, SOLUTION INTRAVENOUS at 19:53

## 2019-03-10 RX ADMIN — HYDROMORPHONE HYDROCHLORIDE 1 MG: 1 INJECTION, SOLUTION INTRAMUSCULAR; INTRAVENOUS; SUBCUTANEOUS at 17:09

## 2019-03-10 RX ADMIN — ONDANSETRON HYDROCHLORIDE 4 MG: 2 INJECTION INTRAMUSCULAR; INTRAVENOUS at 19:59

## 2019-03-10 RX ADMIN — HYDROMORPHONE HYDROCHLORIDE 1 MG: 1 INJECTION, SOLUTION INTRAMUSCULAR; INTRAVENOUS; SUBCUTANEOUS at 12:52

## 2019-03-10 RX ADMIN — Medication 10 ML: at 22:00

## 2019-03-10 RX ADMIN — HYDROMORPHONE HYDROCHLORIDE 1 MG: 1 INJECTION, SOLUTION INTRAMUSCULAR; INTRAVENOUS; SUBCUTANEOUS at 23:40

## 2019-03-10 RX ADMIN — Medication 10 ML: at 04:00

## 2019-03-10 RX ADMIN — POTASSIUM CHLORIDE 10 MEQ: 200 INJECTION, SOLUTION INTRAVENOUS at 11:26

## 2019-03-10 RX ADMIN — HYDROMORPHONE HYDROCHLORIDE 1 MG: 1 INJECTION, SOLUTION INTRAMUSCULAR; INTRAVENOUS; SUBCUTANEOUS at 21:40

## 2019-03-10 RX ADMIN — SODIUM CHLORIDE AND POTASSIUM CHLORIDE: 9; 2.98 INJECTION, SOLUTION INTRAVENOUS at 08:24

## 2019-03-10 RX ADMIN — HYDROMORPHONE HYDROCHLORIDE 1 MG: 1 INJECTION, SOLUTION INTRAMUSCULAR; INTRAVENOUS; SUBCUTANEOUS at 19:04

## 2019-03-10 RX ADMIN — Medication 10 ML: at 12:32

## 2019-03-10 RX ADMIN — HYDROMORPHONE HYDROCHLORIDE 1 MG: 1 INJECTION, SOLUTION INTRAMUSCULAR; INTRAVENOUS; SUBCUTANEOUS at 08:25

## 2019-03-10 RX ADMIN — Medication 10 ML: at 05:47

## 2019-03-10 RX ADMIN — SODIUM CHLORIDE: 900 INJECTION, SOLUTION INTRAVENOUS at 12:27

## 2019-03-10 RX ADMIN — ONDANSETRON HYDROCHLORIDE 4 MG: 2 INJECTION INTRAMUSCULAR; INTRAVENOUS at 12:50

## 2019-03-10 RX ADMIN — HYDROMORPHONE HYDROCHLORIDE 1 MG: 1 INJECTION, SOLUTION INTRAMUSCULAR; INTRAVENOUS; SUBCUTANEOUS at 04:17

## 2019-03-10 RX ADMIN — ONDANSETRON HYDROCHLORIDE 4 MG: 2 INJECTION INTRAMUSCULAR; INTRAVENOUS at 16:21

## 2019-03-10 NOTE — PROGRESS NOTES
Problem: Pain  Goal: *Control of Pain  Outcome: Progressing Towards Goal  Assessing pain Q4. Gave hydromorphone 1252 for pain 7/10, will continue to reassess. Problem: Patient Education: Go to Patient Education Activity  Goal: Patient/Family Education  Outcome: Progressing Towards Goal  Have educated pt. On benefit of getting out of bed and/or turning Q2. Patient refusing mobility interventions at this time. Pulled patient up and repositioned in bed 1236.

## 2019-03-10 NOTE — PROGRESS NOTES
Bedside shift change report given to Ioana Henning (oncoming nurse) by Ryan Kothari RN, SN Maggi (offgoing nurse). Report included the following information SBAR, Kardex, MAR, Recent Results and Cardiac Rhythm sinus tachycardia .

## 2019-03-10 NOTE — PROGRESS NOTES
Problem: Pressure Injury - Risk of  Goal: *Prevention of pressure injury  Document Jacky Scale and appropriate interventions in the flowsheet. Outcome: Progressing Towards Goal  Pressure Injury Interventions:  Sensory Interventions: Minimize linen layers, Turn and reposition approx. every two hours (pillows and wedges if needed)    Moisture Interventions: Absorbent underpads, Internal/External urinary devices    Activity Interventions: Pressure redistribution bed/mattress(bed type)    Mobility Interventions: Pressure redistribution bed/mattress (bed type)    Nutrition Interventions: Document food/fluid/supplement intake    Friction and Shear Interventions: Lift sheet, Minimize layers               Problem: Falls - Risk of  Goal: *Absence of Falls  Document Alicia Fall Risk and appropriate interventions in the flowsheet.   Outcome: Progressing Towards Goal  Fall Risk Interventions:  Mobility Interventions: Mechanical lift, Patient to call before getting OOB    Mentation Interventions: Adequate sleep, hydration, pain control, Update white board, Room close to nurse's station, More frequent rounding    Medication Interventions: Patient to call before getting OOB    Elimination Interventions: Patient to call for help with toileting needs, Call light in reach    History of Falls Interventions: Door open when patient unattended        Problem: Pain  Goal: *Control of Pain  Outcome: Progressing Towards Goal  Evaluate pain level with hourly rounding

## 2019-03-10 NOTE — PROGRESS NOTES
Hospitalist Progress Note  Donell Garcia NP  Answering service: [phone] from in house phone  Cell: [phone]   Date of Service:  3/10/2019  NAME:  Owen Mccarthy  :  1990  MRN:  [id number]    Admission Summary:   Pt presented to the ED with generalized body aches and soreness with any movement. She has a hx of rhabdomyolysis x 2 other times in her life. Reported a mild cold but denied ay strenuous activity or heavy exertion. She hasn't started any new prescription meds, but started taking a weight loss pill called \"thermofight-x\" ~ 1 week PTA. She also reported she noticed her urine had turned a dark brown color. Due to her hx of rhabdo, she noted these symptoms and came to the ED with expectation of the diagnosis. Interval history / Subjective:   Continues to be very flat affect and will not get OOB despite our discussions. Aware of muscle biopsy tomorrow as plan. Eating is variable. Electrolytes are making some improvement. She reports being \"sore\" all over still. Will not answer some of my questions     Assessment & Plan:     Rhabdomyolysis:  - etiology is uncertain. - significant hypokalemia on admit which could precipitate rhabdo. She also started taking a weight-loss pill called Thermofight-X which contains multiple ingredients including chromium and a \"proprietery blend. \"   - UDS negative and denies any sort of illicit drug   - fluids continue, NS with 40mEq KCL @ 200ml/hr  - monitoring electrolytes  - pain controlled - dilaudid appears to be working better.   - TSH 0.30; JAMARCUS negative  - pt has been declining muscle biopsy but has agreed to it for Monday 3/11.  - pt needs to be NPO after midnight  and hold lovenox  (done!)  - ,200 on 3/9 and down to 353,600!     Transaminitis:   - Need to monitor closely given gross elevation of ALT 1,409 & AST > 2,000  - Monitor daily -> was not checked so will add tomorrow    Hypokalemia  - Finally up to 3.5 this morning with multiple runs of KCL yesterday and adding to IVF. - Renal has ordered some additional K+ today and continue to monitor electrolytes closely    Hypocalcemia:   - persists and only should give IV replacement IF patient is symptomatic. She is on oral replacement and need to be cautious of rebound hyper calcemia. Have discussed this with nephrology. - Serum Ca 5.1 on 3/9 and we identified that she has been refusing her PO Ca so after discussion with Dr. Reyna Gotti we replaced with 1g IV Ca Gluconate. - Otherwise asymptomatic    ITZ:   - creatinine normalized, 0.90  - nephrology following  - Holley placed 3/4 due to inability to void as well as increasing CK and need to better monitor output from a renal standpoint - urine is brown/red  - strict I/O    Loose Stool:   - no fever, no leukocytosis, no abdominal pain/tenderness  - Improving    Code status:Full  DVT prophylaxis: Lovenox  Care Plan discussed with: patient, nurse, attending  Disposition: home when able         Hospital Problems  Date Reviewed: 5/12/2016          Codes Class Noted POA    Hypokalemia ICD-10-CM: E87.6  ICD-9-CM: 276.8  3/4/2019 Yes        * (Principal) Rhabdomyolysis ICD-10-CM: U04.89  ICD-9-CM: 728.88  5/4/2016 Yes            Review of Systems:   Denies CP or SOB, no abdominal pain    Vital Signs:    Last 24hrs VS reviewed since prior progress note. Most recent are:  Visit Vitals  /81 (BP 1 Location: Right arm, BP Patient Position: At rest)   Pulse (!) 118   Temp 98.1 °F (36.7 °C)   Resp 18   Ht 5' 3\" (1.6 m)   Wt 118.9 kg (262 lb 2 oz)   SpO2 95%   BMI 46.43 kg/m²       Intake/Output Summary (Last 24 hours) at 3/10/2019 1153  Last data filed at 3/10/2019 6762  Gross per 24 hour   Intake --   Output 2175 ml   Net -2175 ml      Physical Examination:         Constitutional:  Cooperative, pleasant. Very flat   Resp:  CTA bilaterally. No wheezing. No accessory muscle use and on RA. CV:  Regular rhythm, tachycardic rate. No murmurs. GI:  Obese. Non distended, non tender. Normoactive bowel sounds. Musculoskeletal:  Generalized lower extremity edema. +2 distal pulses x 4    Neurologic:  Moves all extremities but slowly and asking for help to lift her legs. AAOx3. Sensation is intact. No focal deficits   Lines: Holley present, draining red/brown urine   PICC: Placed 3/6 (right)      Data Review:   Labs Reviewed on 3/9    Labs:     No results for input(s): WBC, HGB, HCT, PLT, HGBEXT, HCTEXT, PLTEXT, HGBEXT, HCTEXT, PLTEXT in the last 72 hours. Recent Labs     03/10/19  0546 03/10/19  0542 03/10/19  0042 03/09/19  1411 03/09/19  0321  03/08/19  0000     --  140 145 138   < > 133*   K 3.5  --  3.5 2.3* 2.9*   < > 4.3   *  --  108 118* 106   < > 98   CO2 21  --  21 19* 22   < > 22   BUN 21*  --  21* 16 20   < > 21*   CREA 0.69  --  0.71 0.38* 0.67   < > 0.90   GLU 98  --  87 65 76   < > 92   CA 8.5  --  8.2* 5.1* 6.7*   < > 5.4*   MG 1.9  --   --   --   --   --   --    PHOS  --  1.3*  --   --  2.3*  --  5.4*    < > = values in this interval not displayed. Recent Labs     03/09/19  0321   SGOT >2,000*   ALT 1,409*   AP 55   TBILI 0.3   TP 5.5*   ALB 1.9*   GLOB 3.6     No results for input(s): INR, PTP, APTT in the last 72 hours. No lab exists for component: INREXT, INREXT   No results for input(s): FE, TIBC, PSAT, FERR in the last 72 hours. No results found for: FOL, RBCF   No results for input(s): PH, PCO2, PO2 in the last 72 hours.   Recent Labs     03/09/19  0200 03/08/19  0000 03/07/19  1417   ,200* 1,685,800* 4,724,879*     No results found for: CHOL, CHOLX, CHLST, CHOLV, HDL, LDL, LDLC, DLDLP, TGLX, TRIGL, TRIGP, CHHD, CHHDX  Lab Results   Component Value Date/Time    Glucose (POC) 92 04/28/2013 11:56 AM     Lab Results   Component Value Date/Time    Color RED 03/07/2019 05:09 AM    Appearance TURBID (A) 03/07/2019 05:09 AM    Specific gravity 1.022 03/07/2019 05:09 AM    Specific gravity 1.025 03/04/2019 12:14 AM    pH (UA) 6.0 03/07/2019 05:09 AM    Protein NEGATIVE  03/07/2019 05:09 AM    Glucose NEGATIVE  03/07/2019 05:09 AM    Ketone NEGATIVE  03/07/2019 05:09 AM    Bilirubin NEGATIVE  05/26/2016 07:42 PM    Urobilinogen 1.0 03/07/2019 05:09 AM    Nitrites POSITIVE (A) 03/07/2019 05:09 AM    Leukocyte Esterase NEGATIVE  03/07/2019 05:09 AM    Epithelial cells FEW 03/07/2019 05:09 AM    Bacteria NEGATIVE  03/07/2019 05:09 AM    WBC 0-4 03/07/2019 05:09 AM    RBC 5-10 03/07/2019 05:09 AM     Medications Reviewed:     Current Facility-Administered Medications   Medication Dose Route Frequency    potassium phosphate 30 mmol in 0.9% sodium chloride 250 mL infusion   IntraVENous ONCE    0.9% sodium chloride with KCl 40 mEq/L infusion   IntraVENous CONTINUOUS    sodium chloride (NS) flush 20 mL  20 mL InterCATHeter PRN    sodium chloride (NS) flush 10 mL  10 mL InterCATHeter Q24H    sodium chloride (NS) flush 10 mL  10 mL InterCATHeter PRN    sodium chloride (NS) flush 10 mL  10 mL InterCATHeter Q8H    alteplase (CATHFLO) 1 mg in sterile water (preservative free) 1 mL injection  1 mg InterCATHeter PRN    HYDROmorphone (PF) (DILAUDID) injection 1 mg  1 mg IntraVENous Q2H PRN    calcium carbonate (TUMS) chewable tablet 400 mg [elemental]  400 mg Oral TID WITH MEALS    sodium chloride (NS) flush 5-40 mL  5-40 mL IntraVENous Q8H    sodium chloride (NS) flush 5-40 mL  5-40 mL IntraVENous PRN    oxyCODONE IR (ROXICODONE) tablet 5 mg  5 mg Oral Q4H PRN    acetaminophen (TYLENOL) tablet 500 mg  500 mg Oral Q6H PRN    ondansetron (ZOFRAN) injection 4 mg  4 mg IntraVENous Q4H PRN   ______________________________________________________________________  EXPECTED LENGTH OF STAY: 3d 4h  ACTUAL LENGTH OF STAY:          6               Nydia Burt, NP

## 2019-03-10 NOTE — PROGRESS NOTES
Name: Sheree Diaz MRN: [id number]   : 1990 Hospital: [facility] [address]   Date: 3/10/2019        IMPRESSION:   · Severe rhabdomyolysis with CPK 96,000  · ITZ- stable, non oliguric  · Hemoglobinuria from Rhabdo. · Multiple electrolytes abnormalities  ·   Hypocalcemia- currently asymptomatic. ·   Hypokalemia:   · Hypophosphetemia      PLAN:   · Continue present care and monitor electrolytes. · Continue on IVF Mcguire@[employer] ml/hr  · Give Kphos 30 mmmol IVX1. · Muscle biopsy is planned for Monday. Subjective/Interval History:   I have reviewed the flowsheet and previous days notes. ROS:Pertinent items are noted in HPI. Patient feels better. She had her pain meds. Objective:   Vital Signs:    Visit Vitals  /81 (BP 1 Location: Right arm, BP Patient Position: At rest)   Pulse (!) 118   Temp 98.1 °F (36.7 °C)   Resp 18   Ht 5' 3\" (1.6 m)   Wt 118.9 kg (262 lb 2 oz)   SpO2 95%   BMI 46.43 kg/m²       O2 Device: Room air       Temp (24hrs), Av.8 °F (36.6 °C), Min:97.6 °F (36.4 °C), Max:98.1 °F (36.7 °C)       Intake/Output:   Last shift:      No intake/output data recorded. Last 3 shifts:  1901 - 03/10 0700  In: -   Out: 4725 [Urine:4725]    Intake/Output Summary (Last 24 hours) at 3/10/2019 1149  Last data filed at 3/10/2019 4697  Gross per 24 hour   Intake --   Output 2175 ml   Net -2175 ml        Physical Exam:  General:    Alert, cooperative, no distress, appears stated age. Sleepy. Head:   Normocephalic, without obvious abnormality, atraumatic. Eyes:   Conjunctivae/corneas clear. Lungs:   Clear to auscultation bilaterally. No Wheezing or Rhonchi. No rales. Chest wall:  No tenderness or deformity. No Accessory muscle use. Heart:   Regular rate and rhythm,  no murmur, rub or gallop. Abdomen:   Soft, non-tender. Not distended. Bowel sounds normal. No masses. Holley cath is in draining brownish urine. Extremities: Extremities normal, atraumatic, No cyanosis. No edema.  No clubbing  Skin:     Texture, turgor normal. No rashes or lesions. Not Jaundiced  Psych:  Good insight. Not depressed. Not anxious or agitated. Neurologic: Normal strength, Alert and oriented X 3. DATA:  Labs:  Recent Labs     03/10/19  0546 03/10/19  0542 03/10/19  0042 03/09/19  1411 03/09/19  0321  03/08/19  0000     --  140 145 138   < > 133*   K 3.5  --  3.5 2.3* 2.9*   < > 4.3   *  --  108 118* 106   < > 98   CO2 21  --  21 19* 22   < > 22   BUN 21*  --  21* 16 20   < > 21*   CREA 0.69  --  0.71 0.38* 0.67   < > 0.90   CA 8.5  --  8.2* 5.1* 6.7*   < > 5.4*   ALB  --   --   --   --  1.9*  --   --    PHOS  --  1.3*  --   --  2.3*  --  5.4*    < > = values in this interval not displayed. No results for input(s): WBC, HGB, HCT, PLT, HGBEXT, HCTEXT, PLTEXT, HGBEXT, HCTEXT, PLTEXT in the last 72 hours. No results for input(s): VICKI, KU, CLU, CREAU in the last 72 hours.     No lab exists for component: PROU    Total time spent with patient:  35 minutes    [] Critical Care Provided    Care Plan discussed with:   Family, Medical Team    Zeny Ruiz MD

## 2019-03-10 NOTE — PROGRESS NOTES
Progress Note    Patient: Susan Hensley MRN: [id number]  SSN: [id number]    YOB: 1990  Age: 34 y.o. Sex: female      Admit Date: 3/3/2019    · Severe rhabdomyolysis    Subjective:     No acute surgical issues. Pt reported soreness to lower and upper extremity.       Objective:     Visit Vitals  /83 (BP 1 Location: Right arm, BP Patient Position: At rest)   Pulse (!) 117   Temp 97.9 °F (36.6 °C)   Resp 20   Ht 5' 3\" (1.6 m)   Wt 262 lb 2 oz (118.9 kg)   SpO2 91%   BMI 46.43 kg/m²       Temp (24hrs), Av.8 °F (36.6 °C), Min:97.6 °F (36.4 °C), Max:98.1 °F (36.7 °C)        Physical Exam:    Gen:  NAD  Pulm:  Unlabored  Ext:  Motor and sensation intact to bilateral upper and lower extremity    Recent Results (from the past 24 hour(s))   METABOLIC PANEL, BASIC    Collection Time: 19  2:11 PM   Result Value Ref Range    Sodium 145 136 - 145 mmol/L    Potassium 2.3 (LL) 3.5 - 5.1 mmol/L    Chloride 118 (H) 97 - 108 mmol/L    CO2 19 (L) 21 - 32 mmol/L    Anion gap 8 5 - 15 mmol/L    Glucose 65 65 - 100 mg/dL    BUN 16 6 - 20 MG/DL    Creatinine 0.38 (L) 0.55 - 1.02 MG/DL    BUN/Creatinine ratio 42 (H) 12 - 20      GFR est AA >60 >60 ml/min/1.73m2    GFR est non-AA >60 >60 ml/min/1.73m2    Calcium 5.1 (LL) 8.5 - 70.7 MG/DL   METABOLIC PANEL, BASIC    Collection Time: 03/10/19 12:42 AM   Result Value Ref Range    Sodium 140 136 - 145 mmol/L    Potassium 3.5 3.5 - 5.1 mmol/L    Chloride 108 97 - 108 mmol/L    CO2 21 21 - 32 mmol/L    Anion gap 11 5 - 15 mmol/L    Glucose 87 65 - 100 mg/dL    BUN 21 (H) 6 - 20 MG/DL    Creatinine 0.71 0.55 - 1.02 MG/DL    BUN/Creatinine ratio 30 (H) 12 - 20      GFR est AA >60 >60 ml/min/1.73m2    GFR est non-AA >60 >60 ml/min/1.73m2    Calcium 8.2 (L) 8.5 - 10.1 MG/DL   CK    Collection Time: 03/10/19  5:42 AM   Result Value Ref Range    ,600 (HH) 26 - 192 U/L   PHOSPHORUS    Collection Time: 03/10/19  5:42 AM   Result Value Ref Range    Phosphorus 1.3 (L) 2.6 - 4.7 MG/DL   METABOLIC PANEL, BASIC    Collection Time: 03/10/19  5:46 AM   Result Value Ref Range    Sodium 139 136 - 145 mmol/L    Potassium 3.5 3.5 - 5.1 mmol/L    Chloride 109 (H) 97 - 108 mmol/L    CO2 21 21 - 32 mmol/L    Anion gap 9 5 - 15 mmol/L    Glucose 98 65 - 100 mg/dL    BUN 21 (H) 6 - 20 MG/DL    Creatinine 0.69 0.55 - 1.02 MG/DL    BUN/Creatinine ratio 30 (H) 12 - 20      GFR est AA >60 >60 ml/min/1.73m2    GFR est non-AA >60 >60 ml/min/1.73m2    Calcium 8.5 8.5 - 10.1 MG/DL   MAGNESIUM    Collection Time: 03/10/19  5:46 AM   Result Value Ref Range    Magnesium 1.9 1.6 - 2.4 mg/dL           Assessment:     Hospital Problems  Date Reviewed: 5/12/2016          Codes Class Noted POA    Hypokalemia ICD-10-CM: E87.6  ICD-9-CM: 276.8  3/4/2019 Yes        * (Principal) Rhabdomyolysis ICD-10-CM: P38.73  ICD-9-CM: 728.88  5/4/2016 Yes              Plan/Recommendations/Medical Decision Making:     - Rhabdomyolysis:  Continue to monitor  - To OR tomorrow for muscle biopsy with Dr. Sisi Quezada  - NPO after midnight    Signed By: Rashad Schaeffer MD     March 10, 2019

## 2019-03-10 NOTE — PROGRESS NOTES
3/9/19  2300: hourly rounding performed, patient sleeping with family at bedside. IV infusing. 3/10/19  0100: BMP redrawn, confirmed pause of infusion of IV fluids with added potassium. The ending of Daylight Saving Time occurred at 0200 hrs. Documentation of patient care and medications administered is done with respect to the time change. 0084: hourly rounding completed, pt sleeping with family at bedside. IV infusing. 0415: hourly rounding completed, vitals done. 7/10 pain, medicated prn per md order. 0500: hourly rounding completed, pt sleeping, no signs of distress or pain. 7464: hourly rounding completed, labs drawn.     0709: pt sleeping, family member at bedside. 2669: Bedside and Verbal shift change report given to Alexandra Guzman RN and SN Krunal (oncoming nurse) by [facility] (offgoing nurse). Report included the following information SBAR, Kardex, Recent Results and Cardiac Rhythm NSR.

## 2019-03-11 ENCOUNTER — ANESTHESIA (OUTPATIENT)
Dept: SURGERY | Age: 29
DRG: 500 | End: 2019-03-11
Payer: SELF-PAY

## 2019-03-11 LAB
ALBUMIN SERPL-MCNC: 1.7 G/DL (ref 3.5–5)
ALBUMIN/GLOB SERPL: 0.5 {RATIO} (ref 1.1–2.2)
ALP SERPL-CCNC: 48 U/L (ref 45–117)
ALT SERPL-CCNC: 910 U/L (ref 12–78)
ANION GAP SERPL CALC-SCNC: 7 MMOL/L (ref 5–15)
ANION GAP SERPL CALC-SCNC: 8 MMOL/L (ref 5–15)
AST SERPL-CCNC: 1798 U/L (ref 15–37)
BILIRUB SERPL-MCNC: 0.2 MG/DL (ref 0.2–1)
BUN SERPL-MCNC: 22 MG/DL (ref 6–20)
BUN SERPL-MCNC: 23 MG/DL (ref 6–20)
BUN/CREAT SERPL: 28 (ref 12–20)
BUN/CREAT SERPL: 30 (ref 12–20)
CALCIUM SERPL-MCNC: 8.2 MG/DL (ref 8.5–10.1)
CALCIUM SERPL-MCNC: 8.3 MG/DL (ref 8.5–10.1)
CHLORIDE SERPL-SCNC: 112 MMOL/L (ref 97–108)
CHLORIDE SERPL-SCNC: 112 MMOL/L (ref 97–108)
CK SERPL-CCNC: ABNORMAL U/L (ref 26–192)
CO2 SERPL-SCNC: 22 MMOL/L (ref 21–32)
CO2 SERPL-SCNC: 22 MMOL/L (ref 21–32)
CREAT SERPL-MCNC: 0.76 MG/DL (ref 0.55–1.02)
CREAT SERPL-MCNC: 0.78 MG/DL (ref 0.55–1.02)
GLOBULIN SER CALC-MCNC: 3.3 G/DL (ref 2–4)
GLUCOSE SERPL-MCNC: 93 MG/DL (ref 65–100)
GLUCOSE SERPL-MCNC: 95 MG/DL (ref 65–100)
MAGNESIUM SERPL-MCNC: 1.7 MG/DL (ref 1.6–2.4)
PHOSPHATE SERPL-MCNC: 1.1 MG/DL (ref 2.6–4.7)
POTASSIUM SERPL-SCNC: 4.1 MMOL/L (ref 3.5–5.1)
POTASSIUM SERPL-SCNC: 4.3 MMOL/L (ref 3.5–5.1)
PROT SERPL-MCNC: 5 G/DL (ref 6.4–8.2)
SODIUM SERPL-SCNC: 141 MMOL/L (ref 136–145)
SODIUM SERPL-SCNC: 142 MMOL/L (ref 136–145)

## 2019-03-11 PROCEDURE — 77030002933 HC SUT MCRYL J&J -A: Performed by: SURGERY

## 2019-03-11 PROCEDURE — 74011000250 HC RX REV CODE- 250: Performed by: SURGERY

## 2019-03-11 PROCEDURE — 77030011640 HC PAD GRND REM COVD -A: Performed by: SURGERY

## 2019-03-11 PROCEDURE — 77030031139 HC SUT VCRL2 J&J -A: Performed by: SURGERY

## 2019-03-11 PROCEDURE — 88305 TISSUE EXAM BY PATHOLOGIST: CPT

## 2019-03-11 PROCEDURE — 77030008684 HC TU ET CUF COVD -B: Performed by: ANESTHESIOLOGY

## 2019-03-11 PROCEDURE — 74011250636 HC RX REV CODE- 250/636: Performed by: ANESTHESIOLOGY

## 2019-03-11 PROCEDURE — 77030020782 HC GWN BAIR PAWS FLX 3M -B

## 2019-03-11 PROCEDURE — 74011000250 HC RX REV CODE- 250: Performed by: NURSE PRACTITIONER

## 2019-03-11 PROCEDURE — 76060000033 HC ANESTHESIA 1 TO 1.5 HR: Performed by: SURGERY

## 2019-03-11 PROCEDURE — 76010000149 HC OR TIME 1 TO 1.5 HR: Performed by: SURGERY

## 2019-03-11 PROCEDURE — 88314 HISTOCHEMICAL STAINS ADD-ON: CPT

## 2019-03-11 PROCEDURE — 74011250636 HC RX REV CODE- 250/636: Performed by: HOSPITALIST

## 2019-03-11 PROCEDURE — 76210000006 HC OR PH I REC 0.5 TO 1 HR: Performed by: SURGERY

## 2019-03-11 PROCEDURE — 88342 IMHCHEM/IMCYTCHM 1ST ANTB: CPT

## 2019-03-11 PROCEDURE — 84100 ASSAY OF PHOSPHORUS: CPT

## 2019-03-11 PROCEDURE — 74011250636 HC RX REV CODE- 250/636: Performed by: NURSE PRACTITIONER

## 2019-03-11 PROCEDURE — 36415 COLL VENOUS BLD VENIPUNCTURE: CPT

## 2019-03-11 PROCEDURE — 88319 ENZYME HISTOCHEMISTRY: CPT

## 2019-03-11 PROCEDURE — 74011000250 HC RX REV CODE- 250

## 2019-03-11 PROCEDURE — 80053 COMPREHEN METABOLIC PANEL: CPT

## 2019-03-11 PROCEDURE — 93005 ELECTROCARDIOGRAM TRACING: CPT

## 2019-03-11 PROCEDURE — 77030026438 HC STYL ET INTUB CARD -A: Performed by: ANESTHESIOLOGY

## 2019-03-11 PROCEDURE — 82550 ASSAY OF CK (CPK): CPT

## 2019-03-11 PROCEDURE — 74011250636 HC RX REV CODE- 250/636

## 2019-03-11 PROCEDURE — 77030039266 HC ADH SKN EXOFIN S2SG -A: Performed by: SURGERY

## 2019-03-11 PROCEDURE — 88348 ELECTRON MICROSCOPY DX: CPT

## 2019-03-11 PROCEDURE — 0KBR0ZX EXCISION OF LEFT UPPER LEG MUSCLE, OPEN APPROACH, DIAGNOSTIC: ICD-10-PCS | Performed by: SURGERY

## 2019-03-11 PROCEDURE — 77030018836 HC SOL IRR NACL ICUM -A: Performed by: SURGERY

## 2019-03-11 PROCEDURE — 88341 IMHCHEM/IMCYTCHM EA ADD ANTB: CPT

## 2019-03-11 PROCEDURE — 88313 SPECIAL STAINS GROUP 2: CPT

## 2019-03-11 PROCEDURE — 65660000000 HC RM CCU STEPDOWN

## 2019-03-11 RX ORDER — DIPHENHYDRAMINE HYDROCHLORIDE 50 MG/ML
12.5 INJECTION, SOLUTION INTRAMUSCULAR; INTRAVENOUS AS NEEDED
Status: DISCONTINUED | OUTPATIENT
Start: 2019-03-11 | End: 2019-03-11 | Stop reason: HOSPADM

## 2019-03-11 RX ORDER — LIDOCAINE HYDROCHLORIDE 10 MG/ML
0.1 INJECTION, SOLUTION EPIDURAL; INFILTRATION; INTRACAUDAL; PERINEURAL AS NEEDED
Status: DISCONTINUED | OUTPATIENT
Start: 2019-03-11 | End: 2019-03-11 | Stop reason: HOSPADM

## 2019-03-11 RX ORDER — ROCURONIUM BROMIDE 10 MG/ML
INJECTION, SOLUTION INTRAVENOUS AS NEEDED
Status: DISCONTINUED | OUTPATIENT
Start: 2019-03-11 | End: 2019-03-11 | Stop reason: HOSPADM

## 2019-03-11 RX ORDER — SODIUM CHLORIDE 0.9 % (FLUSH) 0.9 %
5-40 SYRINGE (ML) INJECTION AS NEEDED
Status: DISCONTINUED | OUTPATIENT
Start: 2019-03-11 | End: 2019-03-11 | Stop reason: HOSPADM

## 2019-03-11 RX ORDER — SODIUM CHLORIDE 0.9 % (FLUSH) 0.9 %
5-40 SYRINGE (ML) INJECTION EVERY 8 HOURS
Status: DISCONTINUED | OUTPATIENT
Start: 2019-03-11 | End: 2019-03-11 | Stop reason: HOSPADM

## 2019-03-11 RX ORDER — SODIUM CHLORIDE, SODIUM LACTATE, POTASSIUM CHLORIDE, CALCIUM CHLORIDE 600; 310; 30; 20 MG/100ML; MG/100ML; MG/100ML; MG/100ML
125 INJECTION, SOLUTION INTRAVENOUS CONTINUOUS
Status: DISCONTINUED | OUTPATIENT
Start: 2019-03-11 | End: 2019-03-11 | Stop reason: HOSPADM

## 2019-03-11 RX ORDER — BUPIVACAINE HYDROCHLORIDE 5 MG/ML
INJECTION, SOLUTION EPIDURAL; INTRACAUDAL AS NEEDED
Status: DISCONTINUED | OUTPATIENT
Start: 2019-03-11 | End: 2019-03-11 | Stop reason: HOSPADM

## 2019-03-11 RX ORDER — MIDAZOLAM HYDROCHLORIDE 1 MG/ML
0.5 INJECTION, SOLUTION INTRAMUSCULAR; INTRAVENOUS
Status: DISCONTINUED | OUTPATIENT
Start: 2019-03-11 | End: 2019-03-11 | Stop reason: HOSPADM

## 2019-03-11 RX ORDER — SODIUM CHLORIDE 0.9 % (FLUSH) 0.9 %
5-40 SYRINGE (ML) INJECTION AS NEEDED
Status: DISCONTINUED | OUTPATIENT
Start: 2019-03-11 | End: 2019-03-18 | Stop reason: HOSPADM

## 2019-03-11 RX ORDER — MIDAZOLAM HYDROCHLORIDE 1 MG/ML
1 INJECTION, SOLUTION INTRAMUSCULAR; INTRAVENOUS AS NEEDED
Status: DISCONTINUED | OUTPATIENT
Start: 2019-03-11 | End: 2019-03-11 | Stop reason: HOSPADM

## 2019-03-11 RX ORDER — SODIUM CHLORIDE, SODIUM LACTATE, POTASSIUM CHLORIDE, CALCIUM CHLORIDE 600; 310; 30; 20 MG/100ML; MG/100ML; MG/100ML; MG/100ML
75 INJECTION, SOLUTION INTRAVENOUS CONTINUOUS
Status: DISCONTINUED | OUTPATIENT
Start: 2019-03-11 | End: 2019-03-11 | Stop reason: HOSPADM

## 2019-03-11 RX ORDER — SODIUM CHLORIDE 9 MG/ML
25 INJECTION, SOLUTION INTRAVENOUS CONTINUOUS
Status: DISCONTINUED | OUTPATIENT
Start: 2019-03-11 | End: 2019-03-11 | Stop reason: HOSPADM

## 2019-03-11 RX ORDER — ONDANSETRON 2 MG/ML
4 INJECTION INTRAMUSCULAR; INTRAVENOUS AS NEEDED
Status: DISCONTINUED | OUTPATIENT
Start: 2019-03-11 | End: 2019-03-11 | Stop reason: HOSPADM

## 2019-03-11 RX ORDER — MORPHINE SULFATE 10 MG/ML
2 INJECTION, SOLUTION INTRAMUSCULAR; INTRAVENOUS
Status: DISCONTINUED | OUTPATIENT
Start: 2019-03-11 | End: 2019-03-11 | Stop reason: HOSPADM

## 2019-03-11 RX ORDER — PROPOFOL 10 MG/ML
INJECTION, EMULSION INTRAVENOUS AS NEEDED
Status: DISCONTINUED | OUTPATIENT
Start: 2019-03-11 | End: 2019-03-11 | Stop reason: HOSPADM

## 2019-03-11 RX ORDER — MIDAZOLAM HYDROCHLORIDE 1 MG/ML
INJECTION, SOLUTION INTRAMUSCULAR; INTRAVENOUS AS NEEDED
Status: DISCONTINUED | OUTPATIENT
Start: 2019-03-11 | End: 2019-03-11 | Stop reason: HOSPADM

## 2019-03-11 RX ORDER — HYDROMORPHONE HYDROCHLORIDE 1 MG/ML
0.2 INJECTION, SOLUTION INTRAMUSCULAR; INTRAVENOUS; SUBCUTANEOUS
Status: DISCONTINUED | OUTPATIENT
Start: 2019-03-11 | End: 2019-03-11 | Stop reason: HOSPADM

## 2019-03-11 RX ORDER — FENTANYL CITRATE 50 UG/ML
INJECTION, SOLUTION INTRAMUSCULAR; INTRAVENOUS AS NEEDED
Status: DISCONTINUED | OUTPATIENT
Start: 2019-03-11 | End: 2019-03-11 | Stop reason: HOSPADM

## 2019-03-11 RX ORDER — LIDOCAINE HYDROCHLORIDE 20 MG/ML
INJECTION, SOLUTION EPIDURAL; INFILTRATION; INTRACAUDAL; PERINEURAL AS NEEDED
Status: DISCONTINUED | OUTPATIENT
Start: 2019-03-11 | End: 2019-03-11 | Stop reason: HOSPADM

## 2019-03-11 RX ORDER — BUPIVACAINE HYDROCHLORIDE 5 MG/ML
50 INJECTION, SOLUTION EPIDURAL; INTRACAUDAL ONCE
Status: DISCONTINUED | OUTPATIENT
Start: 2019-03-11 | End: 2019-03-11 | Stop reason: HOSPADM

## 2019-03-11 RX ORDER — HYDROMORPHONE HYDROCHLORIDE 1 MG/ML
1 INJECTION, SOLUTION INTRAMUSCULAR; INTRAVENOUS; SUBCUTANEOUS
Status: DISCONTINUED | OUTPATIENT
Start: 2019-03-11 | End: 2019-03-11

## 2019-03-11 RX ORDER — OXYCODONE HYDROCHLORIDE 5 MG/1
5 TABLET ORAL
Status: DISCONTINUED | OUTPATIENT
Start: 2019-03-11 | End: 2019-03-12 | Stop reason: SDUPTHER

## 2019-03-11 RX ORDER — ROPIVACAINE HYDROCHLORIDE 5 MG/ML
30 INJECTION, SOLUTION EPIDURAL; INFILTRATION; PERINEURAL ONCE
Status: DISCONTINUED | OUTPATIENT
Start: 2019-03-11 | End: 2019-03-11 | Stop reason: HOSPADM

## 2019-03-11 RX ORDER — SODIUM CHLORIDE 0.9 % (FLUSH) 0.9 %
5-40 SYRINGE (ML) INJECTION EVERY 8 HOURS
Status: DISCONTINUED | OUTPATIENT
Start: 2019-03-11 | End: 2019-03-18 | Stop reason: HOSPADM

## 2019-03-11 RX ORDER — FENTANYL CITRATE 50 UG/ML
50 INJECTION, SOLUTION INTRAMUSCULAR; INTRAVENOUS AS NEEDED
Status: DISCONTINUED | OUTPATIENT
Start: 2019-03-11 | End: 2019-03-11 | Stop reason: HOSPADM

## 2019-03-11 RX ORDER — FENTANYL CITRATE 50 UG/ML
25 INJECTION, SOLUTION INTRAMUSCULAR; INTRAVENOUS
Status: DISCONTINUED | OUTPATIENT
Start: 2019-03-11 | End: 2019-03-11 | Stop reason: HOSPADM

## 2019-03-11 RX ORDER — ENOXAPARIN SODIUM 100 MG/ML
40 INJECTION SUBCUTANEOUS EVERY 24 HOURS
Status: DISCONTINUED | OUTPATIENT
Start: 2019-03-11 | End: 2019-03-18 | Stop reason: HOSPADM

## 2019-03-11 RX ADMIN — ONDANSETRON HYDROCHLORIDE 4 MG: 2 INJECTION INTRAMUSCULAR; INTRAVENOUS at 21:05

## 2019-03-11 RX ADMIN — HYDROMORPHONE HYDROCHLORIDE 1 MG: 1 INJECTION, SOLUTION INTRAMUSCULAR; INTRAVENOUS; SUBCUTANEOUS at 19:55

## 2019-03-11 RX ADMIN — ROCURONIUM BROMIDE 40 MG: 10 INJECTION, SOLUTION INTRAVENOUS at 09:47

## 2019-03-11 RX ADMIN — HYDROMORPHONE HYDROCHLORIDE 1 MG: 1 INJECTION, SOLUTION INTRAMUSCULAR; INTRAVENOUS; SUBCUTANEOUS at 03:53

## 2019-03-11 RX ADMIN — PROPOFOL 200 MG: 10 INJECTION, EMULSION INTRAVENOUS at 09:47

## 2019-03-11 RX ADMIN — SODIUM CHLORIDE, POTASSIUM CHLORIDE, SODIUM LACTATE AND CALCIUM CHLORIDE: 600; 310; 30; 20 INJECTION, SOLUTION INTRAVENOUS at 09:27

## 2019-03-11 RX ADMIN — Medication 10 ML: at 22:00

## 2019-03-11 RX ADMIN — FENTANYL CITRATE 50 MCG: 50 INJECTION, SOLUTION INTRAMUSCULAR; INTRAVENOUS at 09:47

## 2019-03-11 RX ADMIN — SODIUM CHLORIDE AND POTASSIUM CHLORIDE: 9; 2.98 INJECTION, SOLUTION INTRAVENOUS at 21:39

## 2019-03-11 RX ADMIN — Medication 10 ML: at 15:26

## 2019-03-11 RX ADMIN — Medication 10 ML: at 04:00

## 2019-03-11 RX ADMIN — HYDROMORPHONE HYDROCHLORIDE 1 MG: 1 INJECTION, SOLUTION INTRAMUSCULAR; INTRAVENOUS; SUBCUTANEOUS at 06:25

## 2019-03-11 RX ADMIN — Medication 10 ML: at 06:00

## 2019-03-11 RX ADMIN — SODIUM CHLORIDE 500 ML: 900 INJECTION, SOLUTION INTRAVENOUS at 10:57

## 2019-03-11 RX ADMIN — FENTANYL CITRATE 50 MCG: 50 INJECTION, SOLUTION INTRAMUSCULAR; INTRAVENOUS at 10:03

## 2019-03-11 RX ADMIN — ENOXAPARIN SODIUM 40 MG: 40 INJECTION SUBCUTANEOUS at 19:20

## 2019-03-11 RX ADMIN — SODIUM CHLORIDE AND POTASSIUM CHLORIDE: 9; 2.98 INJECTION, SOLUTION INTRAVENOUS at 00:49

## 2019-03-11 RX ADMIN — Medication 10 ML: at 15:25

## 2019-03-11 RX ADMIN — SODIUM CHLORIDE 500 ML: 900 INJECTION, SOLUTION INTRAVENOUS at 15:34

## 2019-03-11 RX ADMIN — POTASSIUM PHOSPHATE, MONOBASIC AND POTASSIUM PHOSPHATE, DIBASIC: 224; 236 INJECTION, SOLUTION INTRAVENOUS at 15:18

## 2019-03-11 RX ADMIN — MIDAZOLAM HYDROCHLORIDE 2 MG: 1 INJECTION, SOLUTION INTRAMUSCULAR; INTRAVENOUS at 09:34

## 2019-03-11 RX ADMIN — SODIUM CHLORIDE AND POTASSIUM CHLORIDE: 9; 2.98 INJECTION, SOLUTION INTRAVENOUS at 06:21

## 2019-03-11 RX ADMIN — HYDROMORPHONE HYDROCHLORIDE 1 MG: 1 INJECTION, SOLUTION INTRAMUSCULAR; INTRAVENOUS; SUBCUTANEOUS at 17:27

## 2019-03-11 RX ADMIN — HYDROMORPHONE HYDROCHLORIDE 1 MG: 1 INJECTION, SOLUTION INTRAMUSCULAR; INTRAVENOUS; SUBCUTANEOUS at 21:40

## 2019-03-11 RX ADMIN — HYDROMORPHONE HYDROCHLORIDE 1 MG: 1 INJECTION, SOLUTION INTRAMUSCULAR; INTRAVENOUS; SUBCUTANEOUS at 18:24

## 2019-03-11 RX ADMIN — LIDOCAINE HYDROCHLORIDE 100 MG: 20 INJECTION, SOLUTION EPIDURAL; INFILTRATION; INTRACAUDAL; PERINEURAL at 09:47

## 2019-03-11 RX ADMIN — HYDROMORPHONE HYDROCHLORIDE 1 MG: 1 INJECTION, SOLUTION INTRAMUSCULAR; INTRAVENOUS; SUBCUTANEOUS at 12:34

## 2019-03-11 NOTE — PROGRESS NOTES
Physical therapy:    Attempted PT session. Pt off the floor for testing. Will continue to follow.  Thank you    Harman Marshall, PT, DPT

## 2019-03-11 NOTE — PROGRESS NOTES
Hospitalist Progress Note  Ap Aquino NP  Answering service: [phone] from in house phone  Cell: [phone]   Date of Service:  3/11/2019  NAME:  Moshe Chang  :  1990  MRN:  [id number]    Admission Summary:   Pt presented to the ED with generalized body aches and soreness with any movement. She has a hx of rhabdomyolysis x 2 other times in her life. Reported a mild cold but denied ay strenuous activity or heavy exertion. She hasn't started any new prescription meds, but started taking a weight loss pill called \"thermofight-x\" ~ 1 week PTA. She also reported she noticed her urine had turned a dark brown color. Due to her hx of rhabdo, she noted these symptoms and came to the ED with expectation of the diagnosis. Interval history / Subjective:   Joie is very flat - expressionless at all times. Sometimes will not answer questions though her mother was in the room today and she was more responsive. Encouraged her to work with PT, do her incentive spirometer and try to eat more - she appears to be in recovery from rhabdo and we need to get her moving. Assessment & Plan:     Rhabdomyolysis:  - etiology is uncertain. - significant hypokalemia on admit which could precipitate rhabdo. She also started taking a weight-loss pill called Thermofight-X which contains multiple ingredients including chromium and a \"proprietery blend. \"   - UDS negative and denies any sort of illicit drug   - fluids continue, NS with 40mEq KCL @ 200ml/hr  - monitoring electrolytes  - pain controlled - dilaudid    - TSH 0.30; JAMARCUS negative  - muscle done today  - lovenox restarted  - CK continues to trend down      Tachycardia:  - onset yesterday morning, -130's  - no pain, no shortness of breath or hypoxia.  No fevers.  - gave 500 mL bolus and EKG done  - EKG appeared Sinus Tach  - replete lytes    Transaminitis: Monitor daily    Hypokalemia: Resolved    Hypocalcemia:   - persists and only should give IV replacement IF patient is symptomatic. She is on oral replacement and need to be cautious of rebound hyper calcemia. Have discussed this with nephrology. - Serum Ca 5.1 on 3/9 and we identified that she has been refusing her PO Ca so after discussion with Dr. Orellana Shen was replaced with 1g IV Ca Gluconate. - Calcium has corrected, pt is still declining tums    Hypophosphatemia:  Replaced yesterday and will again today    ITZ:   - creatinine normalized  - nephrology following  - Holley placed 3/4 due to inability to void as well as increasing CK. Will plan on taking out next 1-2 days as she continues to improve    Loose Stool: Resolved    Code status: Full  DVT prophylaxis: Lovenox resumed  Care Plan discussed with: patient, nurse, attending MD  Disposition: home when able       Hospital Problems  Date Reviewed: 5/12/2016          Codes Class Noted POA    Hypokalemia ICD-10-CM: E87.6  ICD-9-CM: 276.8  3/4/2019 Yes        * (Principal) Rhabdomyolysis ICD-10-CM: A92.33  ICD-9-CM: 728.88  5/4/2016 Yes            Review of Systems:   No headache. No chest pain, pressure or palpitations. No shortness of breath or hypoxia. No GI complaints. Vital Signs:    Last 24hrs VS reviewed since prior progress note. Most recent are:  Visit Vitals  /71   Pulse (!) 121   Temp 98.2 °F (36.8 °C)   Resp 20   Ht 5' 3\" (1.6 m)   Wt 123 kg (271 lb 2.7 oz)   SpO2 99%   BMI 48.03 kg/m²       Intake/Output Summary (Last 24 hours) at 3/11/2019 1720  Last data filed at 3/11/2019 1635  Gross per 24 hour   Intake 1300 ml   Output 2905 ml   Net -1605 ml      Physical Examination:         Constitutional:  Cooperative, pleasant. Very flat   Resp:  CTA bilaterally. No wheezing. No accessory muscle use and on RA. CV:  Regular rhythm, tachycardic rate. No murmurs. GI:  Obese. Non distended, non tender. Normoactive bowel sounds.     Musculoskeletal:  Generalized lower extremity edema. Neurologic:  Moves all extremities but slowly and asking for help to lift her legs. AAOx3. Sensation is intact. No focal deficits   Lines: Holley present, draining yellow urine   PICC: Placed 3/6 (right)      Data Review:   Labs Reviewed on 3/11    Labs:     No results for input(s): WBC, HGB, HCT, PLT, HGBEXT, HCTEXT, PLTEXT, HGBEXT, HCTEXT, PLTEXT in the last 72 hours. Recent Labs     03/11/19  0357 03/10/19  2327 03/10/19  0546 03/10/19  0542 03/09/19  0321    142 139  --    < > 138   K 4.3 4.1 3.5  --    < > 2.9*   * 112* 109*  --    < > 106   CO2 22 22 21  --    < > 22   BUN 23* 22* 21*  --    < > 20   CREA 0.76 0.78 0.69  --    < > 0.67   GLU 95 93 98  --    < > 76   CA 8.2* 8.3* 8.5  --    < > 6.7*   MG  --  1.7 1.9  --   --   --    PHOS 1.1*  --   --  1.3*  --  2.3*    < > = values in this interval not displayed. Recent Labs     03/11/19 0357 03/09/19 0321   SGOT 1,798* >2,000*   * 1,409*   AP 48 55   TBILI 0.2 0.3   TP 5.0* 5.5*   ALB 1.7* 1.9*   GLOB 3.3 3.6     No results for input(s): INR, PTP, APTT in the last 72 hours. No lab exists for component: INREXT, INREXT   No results for input(s): FE, TIBC, PSAT, FERR in the last 72 hours. No results found for: FOL, RBCF   No results for input(s): PH, PCO2, PO2 in the last 72 hours.   Recent Labs     03/11/19  0357 03/10/19  0542 03/09/19  0200   ,230* 353,600* 976,200*     No results found for: CHOL, CHOLX, CHLST, CHOLV, HDL, LDL, LDLC, DLDLP, TGLX, TRIGL, TRIGP, CHHD, CHHDX  Lab Results   Component Value Date/Time    Glucose (POC) 92 04/28/2013 11:56 AM     Lab Results   Component Value Date/Time    Color RED 03/07/2019 05:09 AM    Appearance TURBID (A) 03/07/2019 05:09 AM    Specific gravity 1.022 03/07/2019 05:09 AM    Specific gravity 1.025 03/04/2019 12:14 AM    pH (UA) 6.0 03/07/2019 05:09 AM    Protein NEGATIVE  03/07/2019 05:09 AM    Glucose NEGATIVE  03/07/2019 05:09 AM    Ketone NEGATIVE  03/07/2019 05:09 AM    Bilirubin NEGATIVE  05/26/2016 07:42 PM    Urobilinogen 1.0 03/07/2019 05:09 AM    Nitrites POSITIVE (A) 03/07/2019 05:09 AM    Leukocyte Esterase NEGATIVE  03/07/2019 05:09 AM    Epithelial cells FEW 03/07/2019 05:09 AM    Bacteria NEGATIVE  03/07/2019 05:09 AM    WBC 0-4 03/07/2019 05:09 AM    RBC 5-10 03/07/2019 05:09 AM     Medications Reviewed:     Current Facility-Administered Medications   Medication Dose Route Frequency    sodium chloride (NS) flush 5-40 mL  5-40 mL IntraVENous Q8H    sodium chloride (NS) flush 5-40 mL  5-40 mL IntraVENous PRN    oxyCODONE IR (ROXICODONE) tablet 5 mg  5 mg Oral Q4H PRN    HYDROmorphone (PF) (DILAUDID) injection 1 mg  1 mg IntraVENous Q3H PRN    potassium phosphate 30 mmol in 0.9% sodium chloride 250 mL infusion   IntraVENous ONCE    enoxaparin (LOVENOX) injection 40 mg  40 mg SubCUTAneous Q24H    0.9% sodium chloride with KCl 40 mEq/L infusion   IntraVENous CONTINUOUS    sodium chloride (NS) flush 20 mL  20 mL InterCATHeter PRN    sodium chloride (NS) flush 10 mL  10 mL InterCATHeter Q24H    sodium chloride (NS) flush 10 mL  10 mL InterCATHeter PRN    sodium chloride (NS) flush 10 mL  10 mL InterCATHeter Q8H    alteplase (CATHFLO) 1 mg in sterile water (preservative free) 1 mL injection  1 mg InterCATHeter PRN    HYDROmorphone (PF) (DILAUDID) injection 1 mg  1 mg IntraVENous Q2H PRN    calcium carbonate (TUMS) chewable tablet 400 mg [elemental]  400 mg Oral TID WITH MEALS    sodium chloride (NS) flush 5-40 mL  5-40 mL IntraVENous Q8H    sodium chloride (NS) flush 5-40 mL  5-40 mL IntraVENous PRN    oxyCODONE IR (ROXICODONE) tablet 5 mg  5 mg Oral Q4H PRN    acetaminophen (TYLENOL) tablet 500 mg  500 mg Oral Q6H PRN    ondansetron (ZOFRAN) injection 4 mg  4 mg IntraVENous Q4H PRN   ______________________________________________________________________  EXPECTED LENGTH OF STAY: 3d 4h  ACTUAL LENGTH OF STAY:          [facility] Esme Forbes, NP

## 2019-03-11 NOTE — BRIEF OP NOTE
BRIEF OPERATIVE NOTE    Date of Procedure: 3/11/2019   Preoperative Diagnosis: RHABDOMYOLYSIS  Postoperative Diagnosis: RHABDOMYOLYSIS    Procedure(s):  LEFT THIGH MUSCLE BIOPSY  Surgeon(s) and Role:     Ynes Preston MD - Primary         Surgical Assistant: N/A    Surgical Staff:  Circ-1: Keith Jimenez RN  Circ-Relief: David Carr RN  Scrub RN-1: Rios Du RN  Surg Asst-1: Kenneth Meigs  Event Time In Time Out   Incision Start 03/11/2019 0958    Incision Close 03/11/2019 1026      Anesthesia: General   Estimated Blood Loss: 30 cc  Specimens:   ID Type Source Tests Collected by Time Destination   1 : left thigh muscle biopsy Fresh Thigh  Victor Manuel Méndez MD 3/11/2019 1004 Pathology      Findings: left quadriceps muscle biopsy   Complications: none  Implants: * No implants in log *

## 2019-03-11 NOTE — ANESTHESIA POSTPROCEDURE EVALUATION
Post-Anesthesia Evaluation and Assessment    Patient: Rafael Hankins MRN: [id number]  SSN: [id number]    YOB: 1990  Age: 34 y.o. Sex: female      I have evaluated the patient and they are stable and ready for discharge from the PACU. Cardiovascular Function/Vital Signs  Visit Vitals  /46   Pulse (!) 124   Temp 37.1 °C (98.7 °F)   Resp 22   Ht 5' 3\" (1.6 m)   Wt 123 kg (271 lb 2.7 oz)   SpO2 97%   BMI 48.03 kg/m²       Patient is status post General anesthesia for Procedure(s):  LEFT THIGH MUSCLE BIOPSY. Nausea/Vomiting: None    Postoperative hydration reviewed and adequate. Pain:  Pain Scale 1: Numeric (0 - 10) (03/11/19 1048)  Pain Intensity 1: 0 (03/11/19 1048)   Managed    Neurological Status:   Neuro (WDL): Within Defined Limits (03/11/19 1048)  Neuro  Neurologic State: Sleeping; Appropriate for age;Eyes open to voice (03/11/19 1048)  Orientation Level: Oriented X4 (03/11/19 1048)  Cognition: Follows commands (03/11/19 1048)  Speech: Appropriate for age;Clear (03/11/19 1048)  LUE Motor Response: Purposeful (03/11/19 1048)  LLE Motor Response: Purposeful (03/11/19 1048)  RUE Motor Response: Purposeful (03/11/19 1048)  RLE Motor Response: Purposeful (03/11/19 1048)   At baseline    Mental Status, Level of Consciousness: Alert and  oriented to person, place, and time    Pulmonary Status:   O2 Device: Nasal cannula (03/11/19 1048)   Adequate oxygenation and airway patent    Complications related to anesthesia: None    Post-anesthesia assessment completed. No concerns    Signed By: Brent Kaufman MD     March 11, 2019              Procedure(s):  LEFT THIGH MUSCLE BIOPSY.     <BSHSIANPOST>    Visit Vitals  /46   Pulse (!) 124   Temp 37.1 °C (98.7 °F)   Resp 22   Ht 5' 3\" (1.6 m)   Wt 123 kg (271 lb 2.7 oz)   SpO2 97%   BMI 48.03 kg/m²

## 2019-03-11 NOTE — PERIOP NOTES
TRANSFER - OUT REPORT:    Verbal report given to [address] RN(name) on [address]  being transferred to [address] [place] [address](unit) for routine post - op       Report consisted of patients Situation, Background, Assessment and   Recommendations(SBAR). Time Pre op antibiotic given:N  Anesthesia Stop time: N  Holley Present on Transfer to floor:Y  Order for Holley on Chart:Y  Discharge Prescriptions with Chart:N    Information from the following report(s) SBAR was reviewed with the receiving nurse. Opportunity for questions and clarification was provided. Is the patient on 02? YES       L/Min 2       Other LITERS    Is the patient on a monitor? YES    Is the nurse transporting with the patient? YES    Surgical Waiting Area notified of patient's transfer from PACU? YES      The following personal items collected during your admission accompanied patient upon transfer:   Dental Appliance: Dental Appliances: None  Vision: Visual Aid: None  Hearing Aid:    Jewelry: Jewelry: Body Piercing, Necklace(tongue ring and necklace given to mother; nose ring still in place)  Clothing: Clothing: At bedside  Other Valuables:  Other Valuables: Cell Phone  Valuables sent to safe:

## 2019-03-11 NOTE — PERIOP NOTES
TRANSFER - IN REPORT:    Verbal report received from ZENAIDA Zuñiga on [address]  being received from [phone] for routine progression of care      Report consisted of patients Situation, Background, Assessment and   Recommendations(SBAR). Information from the following report(s) SBAR, Kardex, Procedure Summary, Intake/Output and MAR was reviewed with the receiving nurse. Opportunity for questions and clarification was provided. Assessment completed upon patients arrival to unit and care assumed.

## 2019-03-11 NOTE — PROGRESS NOTES
rounded on patient this afternoon and patient states she was not up to getting out of bed this afternoon and did not feel up to working with Physical therapy. Care management will follow for transitions of care needs.

## 2019-03-11 NOTE — PROGRESS NOTES
Patient is in OR for Biopsy. Her labs were reviewed. Normal kidney function. CPK is trending down. No new recommendations from renal stand point.

## 2019-03-11 NOTE — PROGRESS NOTES
Bedside shift change report given to LUIS Caballero (oncoming nurse) by Larry Hicks (offgoing nurse). Report included the following information SBAR, Kardex, MAR, Recent Results and Cardiac Rhythm Sinus tachycardia .

## 2019-03-12 ENCOUNTER — APPOINTMENT (OUTPATIENT)
Dept: NON INVASIVE DIAGNOSTICS | Age: 29
DRG: 500 | End: 2019-03-12
Attending: INTERNAL MEDICINE
Payer: SELF-PAY

## 2019-03-12 ENCOUNTER — APPOINTMENT (OUTPATIENT)
Dept: GENERAL RADIOLOGY | Age: 29
DRG: 500 | End: 2019-03-12
Attending: NURSE PRACTITIONER
Payer: SELF-PAY

## 2019-03-12 LAB
ALBUMIN SERPL-MCNC: 1.7 G/DL (ref 3.5–5)
ALBUMIN/GLOB SERPL: 0.6 {RATIO} (ref 1.1–2.2)
ALP SERPL-CCNC: 45 U/L (ref 45–117)
ALT SERPL-CCNC: 696 U/L (ref 12–78)
ANION GAP SERPL CALC-SCNC: 9 MMOL/L (ref 5–15)
AST SERPL-CCNC: 1001 U/L (ref 15–37)
ATRIAL RATE: 127 BPM
BASOPHILS # BLD: 0 K/UL (ref 0–0.1)
BASOPHILS NFR BLD: 0 % (ref 0–1)
BILIRUB SERPL-MCNC: 0.3 MG/DL (ref 0.2–1)
BUN SERPL-MCNC: 24 MG/DL (ref 6–20)
BUN/CREAT SERPL: 26 (ref 12–20)
CALCIUM SERPL-MCNC: 8.7 MG/DL (ref 8.5–10.1)
CALCULATED P AXIS, ECG09: 37 DEGREES
CALCULATED R AXIS, ECG10: 16 DEGREES
CALCULATED T AXIS, ECG11: -19 DEGREES
CHLORIDE SERPL-SCNC: 115 MMOL/L (ref 97–108)
CK SERPL-CCNC: ABNORMAL U/L (ref 26–192)
CO2 SERPL-SCNC: 20 MMOL/L (ref 21–32)
CREAT SERPL-MCNC: 0.91 MG/DL (ref 0.55–1.02)
DIAGNOSIS, 93000: NORMAL
DIFFERENTIAL METHOD BLD: ABNORMAL
EOSINOPHIL # BLD: 0.1 K/UL (ref 0–0.4)
EOSINOPHIL NFR BLD: 1 % (ref 0–7)
ERYTHROCYTE [DISTWIDTH] IN BLOOD BY AUTOMATED COUNT: 14.3 % (ref 11.5–14.5)
GLOBULIN SER CALC-MCNC: 2.9 G/DL (ref 2–4)
GLUCOSE SERPL-MCNC: 106 MG/DL (ref 65–100)
HCT VFR BLD AUTO: 36 % (ref 35–47)
HGB BLD-MCNC: 11.2 G/DL (ref 11.5–16)
IMM GRANULOCYTES # BLD AUTO: 0 K/UL
IMM GRANULOCYTES NFR BLD AUTO: 0 %
LYMPHOCYTES # BLD: 1.2 K/UL (ref 0.8–3.5)
LYMPHOCYTES NFR BLD: 8 % (ref 12–49)
MCH RBC QN AUTO: 28.1 PG (ref 26–34)
MCHC RBC AUTO-ENTMCNC: 31.1 G/DL (ref 30–36.5)
MCV RBC AUTO: 90.2 FL (ref 80–99)
METAMYELOCYTES NFR BLD MANUAL: 1 %
MONOCYTES # BLD: 1.6 K/UL (ref 0–1)
MONOCYTES NFR BLD: 11 % (ref 5–13)
MYELOCYTES NFR BLD MANUAL: 2 %
NEUTS BAND NFR BLD MANUAL: 3 % (ref 0–6)
NEUTS SEG # BLD: 11.1 K/UL (ref 1.8–8)
NEUTS SEG NFR BLD: 74 % (ref 32–75)
NRBC # BLD: 0 K/UL (ref 0–0.01)
NRBC BLD-RTO: 0 PER 100 WBC
P-R INTERVAL, ECG05: 128 MS
PHOSPHATE SERPL-MCNC: 1.4 MG/DL (ref 2.6–4.7)
PLATELET # BLD AUTO: 247 K/UL (ref 150–400)
PMV BLD AUTO: 9.3 FL (ref 8.9–12.9)
POTASSIUM SERPL-SCNC: 4.3 MMOL/L (ref 3.5–5.1)
PROT SERPL-MCNC: 4.6 G/DL (ref 6.4–8.2)
Q-T INTERVAL, ECG07: 318 MS
QRS DURATION, ECG06: 78 MS
QTC CALCULATION (BEZET), ECG08: 462 MS
RBC # BLD AUTO: 3.99 M/UL (ref 3.8–5.2)
RBC MORPH BLD: ABNORMAL
SODIUM SERPL-SCNC: 144 MMOL/L (ref 136–145)
VENTRICULAR RATE, ECG03: 127 BPM
WBC # BLD AUTO: 14.4 K/UL (ref 3.6–11)

## 2019-03-12 PROCEDURE — 84100 ASSAY OF PHOSPHORUS: CPT

## 2019-03-12 PROCEDURE — 74011250636 HC RX REV CODE- 250/636: Performed by: NURSE PRACTITIONER

## 2019-03-12 PROCEDURE — 97530 THERAPEUTIC ACTIVITIES: CPT

## 2019-03-12 PROCEDURE — 80053 COMPREHEN METABOLIC PANEL: CPT

## 2019-03-12 PROCEDURE — 74011000250 HC RX REV CODE- 250: Performed by: NURSE PRACTITIONER

## 2019-03-12 PROCEDURE — 74011250636 HC RX REV CODE- 250/636: Performed by: HOSPITALIST

## 2019-03-12 PROCEDURE — 82550 ASSAY OF CK (CPK): CPT

## 2019-03-12 PROCEDURE — 65660000000 HC RM CCU STEPDOWN

## 2019-03-12 PROCEDURE — 85025 COMPLETE CBC W/AUTO DIFF WBC: CPT

## 2019-03-12 PROCEDURE — 36415 COLL VENOUS BLD VENIPUNCTURE: CPT

## 2019-03-12 PROCEDURE — 97110 THERAPEUTIC EXERCISES: CPT

## 2019-03-12 PROCEDURE — 71045 X-RAY EXAM CHEST 1 VIEW: CPT

## 2019-03-12 RX ORDER — HYDROMORPHONE HYDROCHLORIDE 2 MG/ML
1 INJECTION, SOLUTION INTRAMUSCULAR; INTRAVENOUS; SUBCUTANEOUS
Status: DISCONTINUED | OUTPATIENT
Start: 2019-03-12 | End: 2019-03-12 | Stop reason: CLARIF

## 2019-03-12 RX ORDER — HYDROMORPHONE HYDROCHLORIDE 2 MG/ML
1 INJECTION, SOLUTION INTRAMUSCULAR; INTRAVENOUS; SUBCUTANEOUS
Status: DISCONTINUED | OUTPATIENT
Start: 2019-03-12 | End: 2019-03-13

## 2019-03-12 RX ADMIN — HYDROMORPHONE HYDROCHLORIDE 1 MG: 2 INJECTION, SOLUTION INTRAMUSCULAR; INTRAVENOUS; SUBCUTANEOUS at 23:19

## 2019-03-12 RX ADMIN — HYDROMORPHONE HYDROCHLORIDE 1 MG: 2 INJECTION, SOLUTION INTRAMUSCULAR; INTRAVENOUS; SUBCUTANEOUS at 07:44

## 2019-03-12 RX ADMIN — ENOXAPARIN SODIUM 40 MG: 40 INJECTION SUBCUTANEOUS at 16:42

## 2019-03-12 RX ADMIN — Medication 10 ML: at 06:00

## 2019-03-12 RX ADMIN — HYDROMORPHONE HYDROCHLORIDE 1 MG: 2 INJECTION, SOLUTION INTRAMUSCULAR; INTRAVENOUS; SUBCUTANEOUS at 18:47

## 2019-03-12 RX ADMIN — SODIUM CHLORIDE: 900 INJECTION, SOLUTION INTRAVENOUS at 09:54

## 2019-03-12 RX ADMIN — Medication 10 ML: at 23:19

## 2019-03-12 RX ADMIN — Medication 10 ML: at 12:48

## 2019-03-12 RX ADMIN — HYDROMORPHONE HYDROCHLORIDE 1 MG: 2 INJECTION, SOLUTION INTRAMUSCULAR; INTRAVENOUS; SUBCUTANEOUS at 14:53

## 2019-03-12 RX ADMIN — HYDROMORPHONE HYDROCHLORIDE 1 MG: 2 INJECTION, SOLUTION INTRAMUSCULAR; INTRAVENOUS; SUBCUTANEOUS at 12:43

## 2019-03-12 RX ADMIN — Medication 10 ML: at 04:00

## 2019-03-12 RX ADMIN — HYDROMORPHONE HYDROCHLORIDE 1 MG: 2 INJECTION, SOLUTION INTRAMUSCULAR; INTRAVENOUS; SUBCUTANEOUS at 16:39

## 2019-03-12 RX ADMIN — SODIUM CHLORIDE AND POTASSIUM CHLORIDE: 9; 2.98 INJECTION, SOLUTION INTRAVENOUS at 16:42

## 2019-03-12 RX ADMIN — Medication 10 ML: at 12:47

## 2019-03-12 RX ADMIN — ONDANSETRON HYDROCHLORIDE 4 MG: 2 INJECTION INTRAMUSCULAR; INTRAVENOUS at 16:41

## 2019-03-12 RX ADMIN — Medication 10 ML: at 22:00

## 2019-03-12 RX ADMIN — ONDANSETRON HYDROCHLORIDE 4 MG: 2 INJECTION INTRAMUSCULAR; INTRAVENOUS at 09:57

## 2019-03-12 RX ADMIN — SODIUM CHLORIDE AND POTASSIUM CHLORIDE: 9; 2.98 INJECTION, SOLUTION INTRAVENOUS at 07:50

## 2019-03-12 RX ADMIN — ONDANSETRON HYDROCHLORIDE 4 MG: 2 INJECTION INTRAMUSCULAR; INTRAVENOUS at 04:50

## 2019-03-12 RX ADMIN — HYDROMORPHONE HYDROCHLORIDE 1 MG: 1 INJECTION, SOLUTION INTRAMUSCULAR; INTRAVENOUS; SUBCUTANEOUS at 03:33

## 2019-03-12 RX ADMIN — ONDANSETRON HYDROCHLORIDE 4 MG: 2 INJECTION INTRAMUSCULAR; INTRAVENOUS at 19:56

## 2019-03-12 RX ADMIN — HYDROMORPHONE HYDROCHLORIDE 1 MG: 1 INJECTION, SOLUTION INTRAMUSCULAR; INTRAVENOUS; SUBCUTANEOUS at 00:56

## 2019-03-12 RX ADMIN — SODIUM CHLORIDE AND POTASSIUM CHLORIDE: 9; 2.98 INJECTION, SOLUTION INTRAVENOUS at 02:10

## 2019-03-12 NOTE — PROGRESS NOTES
Problem: Mobility Impaired (Adult and Pediatric)  Goal: *Acute Goals and Plan of Care (Insert Text)  Physical Therapy Goals  Initiated 3/7/2019  1. Patient will move from supine to sit and sit to supine  in bed with modified independence within 7 day(s). 2.  Patient will transfer from bed to chair and chair to bed with CGA using the least restrictive device within 7 day(s). 3.  Patient will perform sit to stand with contact guard assist within 7 day(s). 4.  Patient will ambulate with minimal assistance for 25 feet with the least restrictive device within 7 day(s). physical Therapy TREATMENT  Patient: Adrianna Barrett (43 y.o. female)  Date: 3/12/2019  Diagnosis: Rhabdomyolysis [M62.82] Rhabdomyolysis  Procedure(s) (LRB):  LEFT THIGH MUSCLE BIOPSY (Left) 1 Day Post-Op  Precautions: Fall  Chart, physical therapy assessment, plan of care and goals were reviewed. ASSESSMENT:  Pt received in supine agreeable to PT stating that she was feeling better than previously. Resting -124. She rolled with max assist X 2 and required max assist X 2 to come to sitting at EOB. She sat at EOB for about 2 minutes, with some posterior support for her balance and HR was noted to rise, got as high at 151 (alerted her nurse) and pt reported feeling lightheaded (dynamap not accessible at the moment in her room but I do recommend using one next session to monitor BPs). She was returned to supine with total assist X 2 and required total assist X 3 to scoot up to [facility] in supine (using trendelenburg). Post sitting her HR promptly recovered to 114-115 and stayed there for the follow LE ex 5 reps bilaterally: ankle pumps, quad sets,rolling her LEs from her preferred resting position of external rotation to close to hip neutral, assist heel slides, assisted hip abd/add, and glut sets. Exercises were written on her dry erase board. Disposition depending on progress.  Pt is self pay (per chart review) so she would require a [facility] bed for rehab. She is likely to need rehab, I recommend at inpt level. At baseline this pt is independent and works as a manager at [employer]. She presently does not have an OT consult, I spoke with her nurse recommending one. .       Progression toward goals:  []    Improving appropriately and progressing toward goals  [x]    Improving very slowly and progressing toward goals  []    Not making progress toward goals and plan of care will be adjusted     PLAN:  Patient continues to benefit from skilled intervention to address the above impairments. Continue treatment per established plan of care. Discharge Recommendations:  Inpatient Rehab and To Be Determined  Further Equipment Recommendations for Discharge: To be determined     SUBJECTIVE:   Patient stated I feel lightheaded.  in sitting    OBJECTIVE DATA SUMMARY:   Chart checked, pt cleared by nursing  Critical Behavior:  Neurologic State: Alert  Orientation Level: Oriented X4  Cognition: Follows commands     Functional Mobility Training:  Bed Mobility:  Rolling: Maximum assistance;Assist x2  Supine to Sit: Maximum assistance;Assist x2  Sit to Supine: Total assistance;Assist x2  Scooting: Total assistance(X 3 in supine)        Transfers:               not assessed                    Balance:  Sitting: Intact; With support  Ambulation/Gait Training:                                                        Stairs:              Neuro Re-Education:    Therapeutic Exercises:   See assessment above  Pain:  Pain Scale 1: Numeric (0 - 10)  Pain Intensity 1: 0 at rest and 7 during activity  Pain Location 1: Generalized     Pain Description 1: Aching  Pain Intervention(s) 1: Medication (see MAR)  Activity Tolerance:   See assessment above  Please refer to the flowsheet for vital signs taken during this treatment.   After treatment:   []    Patient left in no apparent distress sitting up in chair  [x]    Patient left in no apparent distress in bed  [x]    Call bell left within reach  [x]    Nursing notified  []    Caregiver present  []    Bed alarm activated    COMMUNICATION/COLLABORATION:   The patients plan of care was discussed with: Registered Nurse    Aurelio Wilson   Time Calculation: 25 mins

## 2019-03-12 NOTE — PROGRESS NOTES
Hospitalist Progress Note  Mireille Hendricks NP  Answering service: [phone] from in house phone  Cell: [phone]   Date of Service:  3/12/2019  NAME:  Brayan Camargo  :  1990  MRN:  [id number]    Admission Summary:   Pt presented to the ED with generalized body aches and soreness with any movement. She has a hx of rhabdomyolysis x 2 other times in her life. Reported a mild cold but denied ay strenuous activity or heavy exertion. She hasn't started any new prescription meds, but started taking a weight loss pill called \"thermofight-x\" ~ 1 week PTA. She also reported she noticed her urine had turned a dark brown color. Due to her hx of rhabdo, she noted these symptoms and came to the ED with expectation of the diagnosis. Interval history / Subjective:   A bit more communicative today - says she feels swollen but overall better. Pain controlled, eating some. Discussed the importance of her getting up with therapy and MOVING. We discussed her getting better numbers/medical - wise but needs to improve physically as well stressing that we would like to send her home versus a rehab facility or SNF. Assessment & Plan:     Rhabdomyolysis:  - etiology is uncertain. - significant hypokalemia on admit which could precipitate rhabdo. She also started taking a weight-loss pill called Thermofight-X which contains multiple ingredients including chromium and a \"proprietery blend. \"   - UDS negative and denies any sort of illicit drug   - fluids continue, NS with 40mEq KCL but will drop to 125/hr today  - monitoring electrolytes  - pain controlled - dilaudid    - TSH 0.30; JAMARCUS negative  - muscle bx done 3/11, result pending  - lovenox restarted 3/11   - CK continues to trend down      Tachycardia:  - onset 3/10 morning, -130's - still elevated today with no other symptoms  - no pain, no shortness of breath or hypoxia. No fevers.   - EKG appeared Sinus Tach  - + leukocytosis but no fevers  - obtain chest xray to eval for overload or other pathology    Transaminitis: Monitor daily - improving    Hypokalemia: Resolved    Hypocalcemia: Resolved  - Calcium has corrected, pt is still declining tums    Hypophosphatemia: replacing via IV due to poor acceptance of po    ITZ:   - creatinine normalized  - nephrology following  - Holley placed 3/4 due to inability to void as well as increasing CK. - assessing for fluid overload, need for diuretics. Holley likely out tomorrow. Loose Stool: Resolved    Code status: Full  DVT prophylaxis: Lovenox  Care Plan discussed with: patient/pts , nurse, attending MD  Disposition: home when able. PT/OT. Hospital Problems  Date Reviewed: 5/12/2016          Codes Class Noted POA    Hypokalemia ICD-10-CM: E87.6  ICD-9-CM: 276.8  3/4/2019 Yes        * (Principal) Rhabdomyolysis ICD-10-CM: I88.20  ICD-9-CM: 728.88  5/4/2016 Yes            Review of Systems:   No headache. No chest pain, pressure or palpitations. No shortness of breath or hypoxia. Intermittent nausea and vomiting. Vital Signs:    Last 24hrs VS reviewed since prior progress note. Most recent are:  Visit Vitals  /77 (BP 1 Location: Left arm, BP Patient Position: At rest)   Pulse (!) 129   Temp 98.2 °F (36.8 °C)   Resp 18   Ht 5' 3\" (1.6 m)   Wt 126.9 kg (279 lb 12.2 oz)   SpO2 97%   BMI 49.56 kg/m²       Intake/Output Summary (Last 24 hours) at 3/12/2019 1007  Last data filed at 3/12/2019 0377  Gross per 24 hour   Intake 1300 ml   Output 2405 ml   Net -1105 ml      Physical Examination:         Constitutional:  Cooperative, pleasant. Resp:  CTA bilaterally. No wheezing. No accessory muscle use and on RA. CV:  Regular rhythm, tachycardic rate. No murmurs. GI:  Obese. Non distended, non tender. Normoactive bowel sounds.     Musculoskeletal:  Generalized edema to all extremities (reducing fluids today)    Neurologic:  Moves all extremities slowly. AAOx3. Sensation is intact. No focal deficits   Lines: Holley present, draining clear light yellow urine   PICC: Placed 3/6 (right)      Data Review:   Labs Reviewed on 3/11    Labs:     Recent Labs     03/12/19  0342   WBC 14.4*   HGB 11.2*   HCT 36.0        Recent Labs     03/12/19  0331 03/11/19  0357 03/10/19  2327 03/10/19  0546 03/10/19  0542    141 142 139  --    K 4.3 4.3 4.1 3.5  --    * 112* 112* 109*  --    CO2 20* 22 22 21  --    BUN 24* 23* 22* 21*  --    CREA 0.91 0.76 0.78 0.69  --    * 95 93 98  --    CA 8.7 8.2* 8.3* 8.5  --    MG  --   --  1.7 1.9  --    PHOS 1.4* 1.1*  --   --  1.3*     Recent Labs     03/12/19 0331 03/11/19 0357   SGOT 1,001* 1,798*   * 910*   AP 45 48   TBILI 0.3 0.2   TP 4.6* 5.0*   ALB 1.7* 1.7*   GLOB 2.9 3.3     No results for input(s): INR, PTP, APTT in the last 72 hours. No lab exists for component: INREXT, INREXT   No results for input(s): FE, TIBC, PSAT, FERR in the last 72 hours. No results found for: FOL, RBCF   No results for input(s): PH, PCO2, PO2 in the last 72 hours.   Recent Labs     03/12/19  0331 03/11/19  0357 03/10/19  0542   CPK 41,100* 102,230* 353,600*     No results found for: CHOL, CHOLX, CHLST, CHOLV, HDL, LDL, LDLC, DLDLP, TGLX, TRIGL, TRIGP, CHHD, CHHDX  Lab Results   Component Value Date/Time    Glucose (POC) 92 04/28/2013 11:56 AM     Lab Results   Component Value Date/Time    Color RED 03/07/2019 05:09 AM    Appearance TURBID (A) 03/07/2019 05:09 AM    Specific gravity 1.022 03/07/2019 05:09 AM    Specific gravity 1.025 03/04/2019 12:14 AM    pH (UA) 6.0 03/07/2019 05:09 AM    Protein NEGATIVE  03/07/2019 05:09 AM    Glucose NEGATIVE  03/07/2019 05:09 AM    Ketone NEGATIVE  03/07/2019 05:09 AM    Bilirubin NEGATIVE  05/26/2016 07:42 PM    Urobilinogen 1.0 03/07/2019 05:09 AM    Nitrites POSITIVE (A) 03/07/2019 05:09 AM    Leukocyte Esterase NEGATIVE  03/07/2019 05:09 AM    Epithelial cells FEW 03/07/2019 05:09 AM    Bacteria NEGATIVE  03/07/2019 05:09 AM    WBC 0-4 03/07/2019 05:09 AM    RBC 5-10 03/07/2019 05:09 AM     Medications Reviewed:     Current Facility-Administered Medications   Medication Dose Route Frequency    potassium phosphate 30 mmol in 0.9% sodium chloride 250 mL infusion   IntraVENous ONCE    HYDROmorphone (PF) (DILAUDID) injection 1 mg  1 mg IntraVENous Q2H PRN    sodium chloride (NS) flush 5-40 mL  5-40 mL IntraVENous Q8H    sodium chloride (NS) flush 5-40 mL  5-40 mL IntraVENous PRN    oxyCODONE IR (ROXICODONE) tablet 5 mg  5 mg Oral Q4H PRN    enoxaparin (LOVENOX) injection 40 mg  40 mg SubCUTAneous Q24H    0.9% sodium chloride with KCl 40 mEq/L infusion   IntraVENous CONTINUOUS    sodium chloride (NS) flush 20 mL  20 mL InterCATHeter PRN    sodium chloride (NS) flush 10 mL  10 mL InterCATHeter Q24H    sodium chloride (NS) flush 10 mL  10 mL InterCATHeter PRN    sodium chloride (NS) flush 10 mL  10 mL InterCATHeter Q8H    alteplase (CATHFLO) 1 mg in sterile water (preservative free) 1 mL injection  1 mg InterCATHeter PRN    calcium carbonate (TUMS) chewable tablet 400 mg [elemental]  400 mg Oral TID WITH MEALS    sodium chloride (NS) flush 5-40 mL  5-40 mL IntraVENous Q8H    sodium chloride (NS) flush 5-40 mL  5-40 mL IntraVENous PRN    oxyCODONE IR (ROXICODONE) tablet 5 mg  5 mg Oral Q4H PRN    acetaminophen (TYLENOL) tablet 500 mg  500 mg Oral Q6H PRN    ondansetron (ZOFRAN) injection 4 mg  4 mg IntraVENous Q4H PRN   ______________________________________________________________________  EXPECTED LENGTH OF STAY: 3d 4h  ACTUAL LENGTH OF STAY:          8               Mariel Castano NP

## 2019-03-12 NOTE — PROGRESS NOTES
Spiritual Care Partner Volunteer visited patient in room 357/01 on 3.12.19. Documented by: : Rev. Janette Mendez.  Oswaldo Santos; [facility], to contact [phone] Justin Smith call: [phone]

## 2019-03-12 NOTE — PROGRESS NOTES
CM and NP met with patient and her  at bedside to discuss discharge plan. CM and NP encouraged patient to participate in PT to determine recommendations for discharge. Patient is agreeable, and cm to wait for therapy notes. 1455: CM met with patient at bedside to discuss rehab facility for her to discharge too for therapy. Patient stated she did not want to go for 2 weeks, and wanted control in when she could leave. NP advised CM to send referral to rehab facilities for them to evaluate, and since patient worked with therapy today getting to the edge of the bed she may be able to go home. Patient is agreeable for referrals to be sent to [facility] and [facility].        [facility]

## 2019-03-12 NOTE — PROGRESS NOTES
Problem: Pressure Injury - Risk of  Goal: *Prevention of pressure injury  Document Jacky Scale and appropriate interventions in the flowsheet. Outcome: Progressing Towards Goal  Pressure Injury Interventions:  Sensory Interventions: Float heels, Keep linens dry and wrinkle-free, Sit a 90-degree angle/use footstool if needed, Turn and reposition approx. every two hours (pillows and wedges if needed)    Moisture Interventions: Check for incontinence Q2 hours and as needed, Offer toileting Q_hr, Internal/External urinary devices    Activity Interventions: Increase time out of bed, Pressure redistribution bed/mattress(bed type), PT/OT evaluation    Mobility Interventions: Pressure redistribution bed/mattress (bed type), Turn and reposition approx. every two hours(pillow and wedges)    Nutrition Interventions: Document food/fluid/supplement intake    Friction and Shear Interventions: HOB 30 degrees or less, Lift sheet, Lift team/patient mobility team               Problem: Falls - Risk of  Goal: *Absence of Falls  Document Alicia Fall Risk and appropriate interventions in the flowsheet.   Outcome: Progressing Towards Goal  Fall Risk Interventions:  Mobility Interventions: OT consult for ADLs, PT Consult for mobility concerns, Communicate number of staff needed for ambulation/transfer, Strengthening exercises (ROM-active/passive)    Mentation Interventions: Door open when patient unattended, Increase mobility, More frequent rounding    Medication Interventions: Patient to call before getting OOB    Elimination Interventions: Call light in reach, Patient to call for help with toileting needs, Toileting schedule/hourly rounds    History of Falls Interventions: Evaluate medications/consider consulting pharmacy

## 2019-03-12 NOTE — OP NOTES
[facility]  OPERATIVE REPORT    Name:  Kathryn Dominguez  MR#:  [id number]  :  1990  ACCOUNT #:  [de-identified]  DATE OF SERVICE:  2019      PREOPERATIVE DIAGNOSIS:  Rhabdomyolysis. POSTOPERATIVE DIAGNOSIS:  Rhabdomyolysis. PROCEDURE PERFORMED:  Left thigh muscle biopsy. ATTENDING SURGEON:  Viktoriya Tolentino MD    ASSISTANT:  None. ANESTHESIA:  General endotracheal.    DRAINS:  None. COUNTS:  Sponge count correct. Needle count correct. SPECIMENS:  Segment of the quadriceps muscle. ESTIMATED BLOOD LOSS:  30 mL. IMPLANT:  None. COMPLICATIONS:  None. INDICATION:  The patient is a 66-year-old morbidly obese -American female, recently admitted with rhabdomyolysis. This has been medically managed, and as she has no risk factors other than a weight loss pill, there is concern for a primary muscle process. She was taken to the operating room today for muscle biopsy. FINDINGS:  Biopsy of left quadriceps musculature. PROCEDURE:  The patient was identified as the correct patient in the preoperative holding area and informed consent was confirmed. After answering the patient's remaining questions and performing site verification, she was taken to the operating room and placed on the operating room table in the supine position. Sequential compression devices were placed on both lower extremities. Following the uneventful initiation of general anesthesia, she was carefully secured to the operating room table with safety strap in place. All potential pressure points were padded with egg crate. Her abdomen was prepped and draped in the usual sterile fashion. Final time-out was performed, and it was confirmed that she had received intravenous antibiotics. A 3-cm longitudinal incision was made over the anterior thigh musculature.   The dissection was carried through the subcutaneous fatty tissue, down to the fascia which was incised longitudinally, exposing underlying musculature. A large segment of muscle was dissected free from surrounding muscle fibers, followed by application of a 3-0 Vicryl suture at the caudal and cephalad aspect of this fiber. The muscle fiber was divided between the Vicryl sutures, sharply dissected into four separate specimens and sent fresh for review by Pathology. The wound was irrigated with sterile saline. After confirming adequate hemostasis, the fascia was reapproximated with a running 2-0 Vicryl suture. The deep dermal layer was reapproximated with a running 2-0 Vicryl suture. Skin edges were reapproximated with a combination of subcuticular 4-0 Monocryl suture and skin adhesive. The patient tolerated the procedure well. She was extubated in the operating room, transported to the recovery area in stable condition. The attending surgeon, Dr. Ondina Mcclain, was scrubbed and present for the entire procedure.         Krystina Elder MD      [id number]  D:  03/11/2019 17:05  T:  03/12/2019 10:18  JOB #:  [id number]

## 2019-03-12 NOTE — PROGRESS NOTES
Patient is resting in bed family at the bedside she still not interacting with family or staff to much. She has a flat affect and still requiring Dilaudid Q2 hours for pain. She is not tolerating PO fluids at times she vomited up what she had drank. Holley is still draining w/o difficulty. Will continue to monitor patient for changes in her condition. 2100 CHG bath patient vomited in her bed.    2330 patient is resting in bed she vomited again CHG bath and bed changed    0400 patient resting in bed blood drawn and sent to lab    0600 patient had a BM bed pad changed and patient cleaned with spray and water. 0800 Bedside and Verbal shift change report given to LUIS Anguiano  (oncoming nurse) by Kodi Mcdowell RN  (offgoing nurse). Report included the following information SBAR, MAR, Recent Results and Med Rec Status.

## 2019-03-12 NOTE — PROGRESS NOTES
Occupational Therapy Screening:  Services are indicated. Order is required. An InBasket screening referral was triggered for occupational therapy based on results obtained during the nursing admission assessment. The patients chart was reviewed and the patient is appropriate for a skilled therapy evaluation. Please order a consult for occupational therapy if you are in agreement and would like an evaluation to be completed. Thank you.     Dariel Alberts

## 2019-03-13 ENCOUNTER — APPOINTMENT (OUTPATIENT)
Dept: NON INVASIVE DIAGNOSTICS | Age: 29
DRG: 500 | End: 2019-03-13
Attending: INTERNAL MEDICINE
Payer: SELF-PAY

## 2019-03-13 LAB
ALBUMIN SERPL-MCNC: 1.9 G/DL (ref 3.5–5)
ALBUMIN/GLOB SERPL: 0.7 {RATIO} (ref 1.1–2.2)
ALP SERPL-CCNC: 47 U/L (ref 45–117)
ALT SERPL-CCNC: 573 U/L (ref 12–78)
ANION GAP SERPL CALC-SCNC: 10 MMOL/L (ref 5–15)
ANION GAP SERPL CALC-SCNC: 9 MMOL/L (ref 5–15)
AST SERPL-CCNC: 597 U/L (ref 15–37)
BILIRUB SERPL-MCNC: 0.3 MG/DL (ref 0.2–1)
BUN SERPL-MCNC: 28 MG/DL (ref 6–20)
BUN SERPL-MCNC: 29 MG/DL (ref 6–20)
BUN/CREAT SERPL: 29 (ref 12–20)
BUN/CREAT SERPL: 31 (ref 12–20)
CALCIUM SERPL-MCNC: 8.3 MG/DL (ref 8.5–10.1)
CALCIUM SERPL-MCNC: 8.7 MG/DL (ref 8.5–10.1)
CHLORIDE SERPL-SCNC: 114 MMOL/L (ref 97–108)
CHLORIDE SERPL-SCNC: 115 MMOL/L (ref 97–108)
CK SERPL-CCNC: ABNORMAL U/L (ref 26–192)
CO2 SERPL-SCNC: 20 MMOL/L (ref 21–32)
CO2 SERPL-SCNC: 21 MMOL/L (ref 21–32)
CREAT SERPL-MCNC: 0.89 MG/DL (ref 0.55–1.02)
CREAT SERPL-MCNC: 0.99 MG/DL (ref 0.55–1.02)
ERYTHROCYTE [DISTWIDTH] IN BLOOD BY AUTOMATED COUNT: 14.8 % (ref 11.5–14.5)
GLOBULIN SER CALC-MCNC: 2.9 G/DL (ref 2–4)
GLUCOSE SERPL-MCNC: 101 MG/DL (ref 65–100)
GLUCOSE SERPL-MCNC: 93 MG/DL (ref 65–100)
HCT VFR BLD AUTO: 34.9 % (ref 35–47)
HGB BLD-MCNC: 10.9 G/DL (ref 11.5–16)
MAGNESIUM SERPL-MCNC: 1.4 MG/DL (ref 1.6–2.4)
MCH RBC QN AUTO: 28.2 PG (ref 26–34)
MCHC RBC AUTO-ENTMCNC: 31.2 G/DL (ref 30–36.5)
MCV RBC AUTO: 90.2 FL (ref 80–99)
NRBC # BLD: 0.02 K/UL (ref 0–0.01)
NRBC BLD-RTO: 0.2 PER 100 WBC
PHOSPHATE SERPL-MCNC: 2 MG/DL (ref 2.6–4.7)
PLATELET # BLD AUTO: 215 K/UL (ref 150–400)
PMV BLD AUTO: 8.9 FL (ref 8.9–12.9)
POTASSIUM SERPL-SCNC: 4.5 MMOL/L (ref 3.5–5.1)
POTASSIUM SERPL-SCNC: 4.5 MMOL/L (ref 3.5–5.1)
PROT SERPL-MCNC: 4.8 G/DL (ref 6.4–8.2)
RBC # BLD AUTO: 3.87 M/UL (ref 3.8–5.2)
SODIUM SERPL-SCNC: 144 MMOL/L (ref 136–145)
SODIUM SERPL-SCNC: 145 MMOL/L (ref 136–145)
WBC # BLD AUTO: 11 K/UL (ref 3.6–11)

## 2019-03-13 PROCEDURE — 74011250636 HC RX REV CODE- 250/636: Performed by: NURSE PRACTITIONER

## 2019-03-13 PROCEDURE — 65660000000 HC RM CCU STEPDOWN

## 2019-03-13 PROCEDURE — 80053 COMPREHEN METABOLIC PANEL: CPT

## 2019-03-13 PROCEDURE — 74011250636 HC RX REV CODE- 250/636: Performed by: INTERNAL MEDICINE

## 2019-03-13 PROCEDURE — 82550 ASSAY OF CK (CPK): CPT

## 2019-03-13 PROCEDURE — 84100 ASSAY OF PHOSPHORUS: CPT

## 2019-03-13 PROCEDURE — 74011250636 HC RX REV CODE- 250/636: Performed by: HOSPITALIST

## 2019-03-13 PROCEDURE — 97165 OT EVAL LOW COMPLEX 30 MIN: CPT | Performed by: OCCUPATIONAL THERAPIST

## 2019-03-13 PROCEDURE — 85027 COMPLETE CBC AUTOMATED: CPT

## 2019-03-13 PROCEDURE — 74011250637 HC RX REV CODE- 250/637: Performed by: INTERNAL MEDICINE

## 2019-03-13 PROCEDURE — 83735 ASSAY OF MAGNESIUM: CPT

## 2019-03-13 PROCEDURE — 36415 COLL VENOUS BLD VENIPUNCTURE: CPT

## 2019-03-13 PROCEDURE — 97110 THERAPEUTIC EXERCISES: CPT | Performed by: OCCUPATIONAL THERAPIST

## 2019-03-13 RX ORDER — MAGNESIUM SULFATE HEPTAHYDRATE 40 MG/ML
2 INJECTION, SOLUTION INTRAVENOUS ONCE
Status: COMPLETED | OUTPATIENT
Start: 2019-03-13 | End: 2019-03-13

## 2019-03-13 RX ORDER — HYDROMORPHONE HYDROCHLORIDE 2 MG/ML
1 INJECTION, SOLUTION INTRAMUSCULAR; INTRAVENOUS; SUBCUTANEOUS
Status: DISCONTINUED | OUTPATIENT
Start: 2019-03-13 | End: 2019-03-18 | Stop reason: HOSPADM

## 2019-03-13 RX ORDER — BACITRACIN 500 UNIT/G
1 PACKET (EA) TOPICAL AS NEEDED
Status: DISCONTINUED | OUTPATIENT
Start: 2019-03-13 | End: 2019-03-18 | Stop reason: HOSPADM

## 2019-03-13 RX ORDER — FUROSEMIDE 10 MG/ML
40 INJECTION INTRAMUSCULAR; INTRAVENOUS ONCE
Status: COMPLETED | OUTPATIENT
Start: 2019-03-13 | End: 2019-03-13

## 2019-03-13 RX ORDER — HYDROMORPHONE HYDROCHLORIDE 1 MG/ML
1 INJECTION, SOLUTION INTRAMUSCULAR; INTRAVENOUS; SUBCUTANEOUS
Status: DISCONTINUED | OUTPATIENT
Start: 2019-03-13 | End: 2019-03-13 | Stop reason: CLARIF

## 2019-03-13 RX ORDER — OXYCODONE HCL 5 MG/5 ML
5 SOLUTION, ORAL ORAL
Status: DISCONTINUED | OUTPATIENT
Start: 2019-03-13 | End: 2019-03-18 | Stop reason: HOSPADM

## 2019-03-13 RX ADMIN — FUROSEMIDE 40 MG: 10 INJECTION, SOLUTION INTRAMUSCULAR; INTRAVENOUS at 09:11

## 2019-03-13 RX ADMIN — Medication 10 ML: at 22:09

## 2019-03-13 RX ADMIN — HYDROMORPHONE HYDROCHLORIDE 1 MG: 2 INJECTION, SOLUTION INTRAMUSCULAR; INTRAVENOUS; SUBCUTANEOUS at 19:45

## 2019-03-13 RX ADMIN — Medication 10 ML: at 05:49

## 2019-03-13 RX ADMIN — HYDROMORPHONE HYDROCHLORIDE 1 MG: 2 INJECTION, SOLUTION INTRAMUSCULAR; INTRAVENOUS; SUBCUTANEOUS at 01:25

## 2019-03-13 RX ADMIN — ONDANSETRON HYDROCHLORIDE 4 MG: 2 INJECTION INTRAMUSCULAR; INTRAVENOUS at 01:25

## 2019-03-13 RX ADMIN — Medication 10 ML: at 05:50

## 2019-03-13 RX ADMIN — ONDANSETRON HYDROCHLORIDE 4 MG: 2 INJECTION INTRAMUSCULAR; INTRAVENOUS at 13:29

## 2019-03-13 RX ADMIN — HYDROMORPHONE HYDROCHLORIDE 1 MG: 2 INJECTION, SOLUTION INTRAMUSCULAR; INTRAVENOUS; SUBCUTANEOUS at 05:50

## 2019-03-13 RX ADMIN — Medication 10 ML: at 03:37

## 2019-03-13 RX ADMIN — SODIUM CHLORIDE AND POTASSIUM CHLORIDE: 9; 2.98 INJECTION, SOLUTION INTRAVENOUS at 01:07

## 2019-03-13 RX ADMIN — HYDROMORPHONE HYDROCHLORIDE 1 MG: 2 INJECTION, SOLUTION INTRAMUSCULAR; INTRAVENOUS; SUBCUTANEOUS at 13:55

## 2019-03-13 RX ADMIN — HYDROMORPHONE HYDROCHLORIDE 1 MG: 2 INJECTION, SOLUTION INTRAMUSCULAR; INTRAVENOUS; SUBCUTANEOUS at 03:37

## 2019-03-13 RX ADMIN — MAGNESIUM SULFATE HEPTAHYDRATE 2 G: 40 INJECTION, SOLUTION INTRAVENOUS at 20:28

## 2019-03-13 RX ADMIN — Medication 10 ML: at 22:08

## 2019-03-13 RX ADMIN — HYDROMORPHONE HYDROCHLORIDE 1 MG: 2 INJECTION, SOLUTION INTRAMUSCULAR; INTRAVENOUS; SUBCUTANEOUS at 08:37

## 2019-03-13 NOTE — PROGRESS NOTES
2030: patient vomited, bath given. 2100: pt sleeping. 2210: pt sleeping. 2319: patient reports \"needing to be changed\". Bowel movement noted, soft, patient cleaned and new pad applied. Pt reports 7/10 pain, medicated prn per md order. Blood drawn, lines flushed. 2349: pt sleeping, family at bedside, no signs of pain. 3/13/19  0054: pt sleeping with family at bedside. 8223: pt reports pain 7/10 as well as nausea, medicated prn per md order. Pt reports \"needing to be changed\", soft, greenish stool noted. External catheter care performed. 0221: pt sleeping    0337: pt vomited 200mL greenish/brown emesis. Pt reported drinking ginger ale and then having to vomit. Gown and linens changed, patient bathed. Pt not due for antinausea medication at this time. Pt reporting pain level 7/10, medicated prn per md order. 0409: pt sleeping, no signs of nausea/pain. 1639: pt sleeping.     0724: pt concerned about fluids causing vomiting, refusing fluid administration. Infusion stopped, IV flushed. Will notify day shift.     5747: Bedside and Verbal shift change report given to [phone] Kana Hankins (oncoming nurse) by Feliberto Haq (offgoing nurse). Report included the following information SBAR, Kardex, Intake/Output, Recent Results, Med Rec Status and Cardiac Rhythm Sinus Tach.

## 2019-03-13 NOTE — PROGRESS NOTES
1230 Assumed care of patient  [id number] Patient refused to get out of bed at this time wanted \"to wait a little longer, agreed up on 1400 as a target time)

## 2019-03-13 NOTE — PROGRESS NOTES
Cm updated [facility] and they explained the patient would need to work more with physical therapy in order to be accepted. The goal is for the patient to discharge home. [address] [place] [address] will follow for therapy notes.     [place] [facility]

## 2019-03-13 NOTE — PROGRESS NOTES
Spiritual Care Assessment/Progress Note  [facility]      NAME: Moshe Chang      MRN: [id number]  AGE: 34 y.o. SEX: female  Zoroastrianism Affiliation: Unknown   Language: English     3/13/2019     Total Time (in minutes): 6     Spiritual Assessment begun in [facility] [place] [facility] through conversation with:         [x]Patient        [] Family    [] Friend(s)        Reason for Consult: Initial/Spiritual assessment, patient floor     Spiritual beliefs: (Please include comment if needed)     [] Identifies with a dorian tradition:         [] Supported by a dorian community:            [] Claims no spiritual orientation:           [] Seeking spiritual identity:                [] Adheres to an individual form of spirituality:           [x] Not able to assess:                           Identified resources for coping:      [] Prayer                               [] Music                  [] Guided Imagery     [] Family/friends                 [] Pet visits     [] Devotional reading                         [x] Unknown     [] Other:                                               Interventions offered during this visit: (See comments for more details)    Patient Interventions: Affirmation of emotions/emotional suffering, Normalization of emotional/spiritual concerns           Plan of Care:     [] Support spiritual and/or cultural needs    [] Support AMD and/or advance care planning process      [] Support grieving process   [] Coordinate Rites and/or Rituals    [] Coordination with community clergy   [] No spiritual needs identified at this time   [] Detailed Plan of Care below (See Comments)  [] Make referral to Music Therapy  [] Make referral to Pet Therapy     [] Make referral to Addiction services  [] Make referral to [facility]  [] Make referral to Spiritual Care Partner  [] No future visits requested        [x] Follow up visits as needed     Comments:  for initial visit.  Pt was in bed, it seemed as she was not feeling well at the moment. She asked if I could open her ginger ale and put a straw in it. Let her know of  availability.  follow up as needed.      Ping Elam, [facility]   [phone])

## 2019-03-13 NOTE — PROGRESS NOTES
Hospitalist Progress Note  Erickson Keating MD  Answering service: [phone] from in house phone  Cell: [phone]   Date of Service:  3/13/2019  NAME:  Anila Luther  :  1990  MRN:  [id number]    Admission Summary:   Pt presented to the ED with generalized body aches and soreness with any movement. She has a hx of rhabdomyolysis x 2 other times in her life. Reported a mild cold but denied ay strenuous activity or heavy exertion. She hasn't started any new prescription meds, but started taking a weight loss pill called \"thermofight-x\" ~ 1 week PTA. She also reported she noticed her urine had turned a dark brown color. Due to her hx of rhabdo, she noted these symptoms and came to the ED with expectation of the diagnosis. Interval history / Subjective:   Feeling swollen, and a bit upset today. Still has not gotten out of bed, and remains with urine catheter in place. Discussed lasix today, and that she needs to get up out of bed with removal of catheter. Being sedentary will also harm her in terms of fluid buildup as well. Discussed with her , and her mother by phone. 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 - significant hypokalemia on admit which could precipitate rhabdo. She also started taking a weight-loss pill called Thermofight-X which contains multiple ingredients including chromium and a \"proprietery blend. \"   - UDS negative and denies any sort of illicit drug   - DC fluids today, given signficiant edema, and patient refusing to have them continue, closely monitor kidney function. - monitoring electrolytes  - pain controlled - dilaudid PRN, space to every 4 hours.   - TSH 0.30; JAMARCUS negative  - muscle bx done 3/11, result pending  - lovenox restarted 3/11   - CK continues to trend down      Tachycardia:  - onset 3/10 morning, -130's - still elevated today with no other symptoms  - no pain, no shortness of breath or hypoxia. No fevers. - EKG appeared Sinus Tach  - + leukocytosis but no fevers  - Will re-eval after lasix, if remains tachycardic will check CTA    Transaminitis: Monitor daily - improving    Hypokalemia: Resolved    Hypocalcemia: Resolved  - Calcium has corrected, pt is still declining tums    Hypophosphatemia: replacing via IV due to poor acceptance of po    ITZ: resolved. - nephrology following  - Holley placed 3/4 due to inability to void as well as increasing CK. - Holley out today   - Continue to monitor    Loose Stool: Resolved    Code status: Full  DVT prophylaxis: Lovenox  Care Plan discussed with: patient/pts , nurse, attending MD  Disposition: home when able. PT/OT. Hospital Problems  Date Reviewed: 5/12/2016          Codes Class Noted POA    Hypokalemia ICD-10-CM: E87.6  ICD-9-CM: 276.8  3/4/2019 Yes        * (Principal) Rhabdomyolysis ICD-10-CM: A47.84  ICD-9-CM: 728.88  5/4/2016 Yes            Review of Systems:   No headache. No chest pain, pressure or palpitations. No shortness of breath or hypoxia. Intermittent nausea and vomiting. Vital Signs:    Last 24hrs VS reviewed since prior progress note. Most recent are:  Visit Vitals  /85 (BP 1 Location: Left arm, BP Patient Position: At rest)   Pulse (!) 121   Temp 98 °F (36.7 °C)   Resp 20   Ht 5' 3\" (1.6 m)   Wt 133.5 kg (294 lb 5 oz)   SpO2 96%   BMI 52.14 kg/m²       Intake/Output Summary (Last 24 hours) at 3/13/2019 1609  Last data filed at 3/13/2019 1313  Gross per 24 hour   Intake 60 ml   Output 4450 ml   Net -4390 ml      Physical Examination:         Constitutional:  Cooperative, pleasant. Resp:  CTA bilaterally. No wheezing. No accessory muscle use and on RA. CV:  Regular rhythm, tachycardic rate. No murmurs. GI:  Obese. Non distended, non tender. Normoactive bowel sounds.     Musculoskeletal: Generalized edema to all extremities     Neurologic:  Moves all extremities slowly. AAOx3. Sensation is intact. No focal deficits   Lines: Holley present, draining clear light yellow urine   PICC: Placed 3/6 (right)      Data Review:   Labs Reviewed on 3/12, imaging reviewed. Labs:     Recent Labs     03/13/19 0552 03/12/19  0342   WBC 11.0 14.4*   HGB 10.9* 11.2*   HCT 34.9* 36.0    247     Recent Labs     03/13/19 0552 03/12/19  2318 03/12/19  0331 03/11/19  0357 03/10/19  2327    144 144 141 142   K 4.5 4.5 4.3 4.3 4.1   * 114* 115* 112* 112*   CO2 20* 21 20* 22 22   BUN 29* 28* 24* 23* 22*   CREA 0.99 0.89 0.91 0.76 0.78   * 93 106* 95 93   CA 8.7 8.3* 8.7 8.2* 8.3*   MG 1.4*  --   --   --  1.7   PHOS 2.0*  --  1.4* 1.1*  --      Recent Labs     03/13/19 0552 03/12/19 0331 03/11/19 0357   SGOT 597* 1,001* 1,798*   * 696* 910*   AP 47 45 48   TBILI 0.3 0.3 0.2   TP 4.8* 4.6* 5.0*   ALB 1.9* 1.7* 1.7*   GLOB 2.9 2.9 3.3     No results for input(s): INR, PTP, APTT in the last 72 hours. No lab exists for component: INREXT, INREXT   No results for input(s): FE, TIBC, PSAT, FERR in the last 72 hours. No results found for: FOL, RBCF   No results for input(s): PH, PCO2, PO2 in the last 72 hours.   Recent Labs     03/13/19 0552 03/12/19 0331 03/11/19 0357   CPK 17,840* 41,100* 102,230*     No results found for: CHOL, CHOLX, CHLST, CHOLV, HDL, LDL, LDLC, DLDLP, TGLX, TRIGL, TRIGP, CHHD, CHHDX  Lab Results   Component Value Date/Time    Glucose (POC) 92 04/28/2013 11:56 AM     Lab Results   Component Value Date/Time    Color RED 03/07/2019 05:09 AM    Appearance TURBID (A) 03/07/2019 05:09 AM    Specific gravity 1.022 03/07/2019 05:09 AM    Specific gravity 1.025 03/04/2019 12:14 AM    pH (UA) 6.0 03/07/2019 05:09 AM    Protein NEGATIVE  03/07/2019 05:09 AM    Glucose NEGATIVE  03/07/2019 05:09 AM    Ketone NEGATIVE  03/07/2019 05:09 AM    Bilirubin NEGATIVE  05/26/2016 07:42 PM    Urobilinogen 1.0 03/07/2019 05:09 AM    Nitrites POSITIVE (A) 03/07/2019 05:09 AM    Leukocyte Esterase NEGATIVE  03/07/2019 05:09 AM    Epithelial cells FEW 03/07/2019 05:09 AM    Bacteria NEGATIVE  03/07/2019 05:09 AM    WBC 0-4 03/07/2019 05:09 AM    RBC 5-10 03/07/2019 05:09 AM     Medications Reviewed:     Current Facility-Administered Medications   Medication Dose Route Frequency    magnesium sulfate 2 g/50 ml IVPB (premix or compounded)  2 g IntraVENous ONCE    oxyCODONE (ROXICODONE) 5 mg/5 mL oral solution 5 mg  5 mg Oral Q4H PRN    HYDROmorphone (PF) (DILAUDID) injection 1 mg  1 mg IntraVENous Q4H PRN    bacitracin 500 unit/gram packet 1 Packet  1 Packet Topical PRN    sodium chloride (NS) flush 5-40 mL  5-40 mL IntraVENous Q8H    sodium chloride (NS) flush 5-40 mL  5-40 mL IntraVENous PRN    enoxaparin (LOVENOX) injection 40 mg  40 mg SubCUTAneous Q24H    sodium chloride (NS) flush 20 mL  20 mL InterCATHeter PRN    sodium chloride (NS) flush 10 mL  10 mL InterCATHeter Q24H    sodium chloride (NS) flush 10 mL  10 mL InterCATHeter PRN    sodium chloride (NS) flush 10 mL  10 mL InterCATHeter Q8H    alteplase (CATHFLO) 1 mg in sterile water (preservative free) 1 mL injection  1 mg InterCATHeter PRN    calcium carbonate (TUMS) chewable tablet 400 mg [elemental]  400 mg Oral TID WITH MEALS    sodium chloride (NS) flush 5-40 mL  5-40 mL IntraVENous Q8H    sodium chloride (NS) flush 5-40 mL  5-40 mL IntraVENous PRN    acetaminophen (TYLENOL) tablet 500 mg  500 mg Oral Q6H PRN    ondansetron (ZOFRAN) injection 4 mg  4 mg IntraVENous Q4H PRN   ______________________________________________________________________  EXPECTED LENGTH OF STAY: 3d 4h  ACTUAL LENGTH OF STAY:          9               Ruchi Toledo MD

## 2019-03-13 NOTE — PROGRESS NOTES
Patient refused to allow me to hang her Magnesium IV. \"I do not want any more fluids. \" I explained it was to replace her magnesium. She then stated \"I want to talk to the doctor before I get any more fluids. \" I explained I would be taking her urinary catheter out and she refused. Stating, \"I want to talk to the doctor before you do that. \" Patient also refused Calcium (Tums). \"I don't take pills. I will throw them up. \" I explained they were chewable. Stated, \"I am not taking them. \"    1100 Rang out to be cleaned up. Stated \"Are yall short staffed? I asked to be cleaned up 40 minutes ago. \" Patient cleaned, loose stool, Max assist to turn in bed. Explained to patient she needs to get up to chair. Patient refused to get up to chair until she talks with the doctor. Patient's mother called. Patient told mother we are not listening to her. Mother and I had long discussion regarding need for patient to participate in care by moving, getting out of bed. Mother stated patient does not do well when she is told what to do. Mother reinforced how much pain she is in and how tight her skin is. I explained I understand the pain but it is time for her to start participating in care. Mother then stated she would be up to speak with patient today.

## 2019-03-13 NOTE — PROGRESS NOTES
Problem: Pressure Injury - Risk of  Goal: *Prevention of pressure injury  Document Jacky Scale and appropriate interventions in the flowsheet. Outcome: Progressing Towards Goal  Pressure Injury Interventions:  Sensory Interventions: Monitor skin under medical devices, Avoid rigorous massage over bony prominences, Float heels, Keep linens dry and wrinkle-free    Moisture Interventions: Apply protective barrier, creams and emollients, Absorbent underpads, Check for incontinence Q2 hours and as needed, Internal/External urinary devices, Maintain skin hydration (lotion/cream)    Activity Interventions: Pressure redistribution bed/mattress(bed type), Assess need for specialty bed, PT/OT evaluation    Mobility Interventions: HOB 30 degrees or less, Pressure redistribution bed/mattress (bed type), PT/OT evaluation, Float heels    Nutrition Interventions: Document food/fluid/supplement intake    Friction and Shear Interventions: HOB 30 degrees or less, Minimize layers               Problem: Falls - Risk of  Goal: *Absence of Falls  Document Alicia Fall Risk and appropriate interventions in the flowsheet.   Outcome: Progressing Towards Goal  Fall Risk Interventions:  Mobility Interventions: Communicate number of staff needed for ambulation/transfer, OT consult for ADLs, Patient to call before getting OOB    Mentation Interventions: Adequate sleep, hydration, pain control, Door open when patient unattended, Family/sitter at bedside, More frequent rounding, Update white board, Toileting rounds    Medication Interventions: Patient to call before getting OOB, Teach patient to arise slowly    Elimination Interventions: Call light in reach, Patient to call for help with toileting needs, Toileting schedule/hourly rounds    History of Falls Interventions: Door open when patient unattended

## 2019-03-13 NOTE — PROGRESS NOTES
Problem: Self Care Deficits Care Plan (Adult)  Goal: *Acute Goals and Plan of Care (Insert Text)  Occupational Therapy Goals  Initiated 3/13/2019  1. Patient will perform self-feeding with independence within 7 day(s). 2.  Patient will perform grooming with independence within 7 day(s). 3.  Patient will perform upper body dressing with independence within 7 day(s). 4.  Patient will perform toilet transfers with moderate assistance for stand pivot transfer within 7 day(s). 5.  Patient will perform all aspects of toileting with moderate assistance  within 7 day(s). 6.  Patient will participate in upper extremity therapeutic exercise/activities with supervision/set-up for 10 minutes within 7 day(s). 7.  Patient will stand for functional task unsupported > or = 5 minutes within 7 day(s). Occupational Therapy EVALUATION  Patient: Eva Archuleta (34 y.o. female)  Date: 3/13/2019  Primary Diagnosis: Rhabdomyolysis [M62.82]  Procedure(s) (LRB):  LEFT THIGH MUSCLE BIOPSY (Left) 2 Days Post-Op   Precautions:   Fall    ASSESSMENT :  Based on the objective data described below, the patient presents with flat affect and nodding head only, did not speak entire time in room. Patient with family present and instructed on bed level exercises, positioning, need for increased activity. At this time needing assist to position LE's in bridge, was able to push herself to head of bed using right UE on bedrail and LE's in bridge, however unable to attempt to sit up with assist of 1, feel unsafe due to size and patient effort. Patient request assist with tasks she can physically perform, ie: place straw in ginger ale. Discussed Psych consult with RN who reported she had one ordered. Feel patient may \"spontaneously\" improve. At this time unable to say she could go home as she is not demonstrating ability to care for herself, however feel she is not performing to her max potential. Will continue to follow.      Patient will benefit from skilled intervention to address the above impairments. Patients rehabilitation potential is considered to be Fair  Factors which may influence rehabilitation potential include:   []             None noted  [x]             Mental ability/status  []             Medical condition  []             Home/family situation and support systems  []             Safety awareness  []             Pain tolerance/management  []             Other:      PLAN :  Recommendations and Planned Interventions:  [x]               Self Care Training                  [x]        Therapeutic Activities  [x]               Functional Mobility Training    []        Cognitive Retraining  [x]               Therapeutic Exercises           [x]        Endurance Activities  [x]               Balance Training                   []        Neuromuscular Re-Education  []               Visual/Perceptual Training     [x]   Home Safety Training  [x]               Patient Education                 [x]        Family Training/Education  []               Other (comment):    Frequency/Duration: Patient will be followed by occupational therapy 5 times a week to address goals. Discharge Recommendations: Rehab, Home Health if makes progress  Further Equipment Recommendations for Discharge: none at this time     SUBJECTIVE:   Patient stated: did not talk, nod head only    OBJECTIVE DATA SUMMARY:   HISTORY:   Past Medical History:   Diagnosis Date    Rhabdomyolysis     Traumatic rhabdomyolysis ([facility] [address] 5/12/2016   History reviewed. No pertinent surgical history.     Prior Level of Function/Environment/Context: independent, , manager of restaurant, driving, hx of rhabdo 3 years ago after taking diet pills   Occupations in which the patient is/was successful, what are the barriers preventing that success:   Performance Patterns (routines, roles, habits, and rituals):   Personal Interests and/or values:   Expanded or extensive additional review of patient history:     Home Situation  Home Environment: Apartment  # Steps to Enter: 0  One/Two Story Residence: One story  Living Alone: No  Support Systems: Spouse/Significant Other/Partner  Patient Expects to be Discharged to[de-identified] Private residence  Current DME Used/Available at Home: None    Hand dominance: Right    EXAMINATION OF PERFORMANCE DEFICITS:  Cognitive/Behavioral Status:  Neurologic State: Alert  Orientation Level: Oriented X4  Cognition: Follows commands  Perception: Appears intact  Perseveration: No perseveration noted  Safety/Judgement: Not assessed    Skin: not assess formally, noted redness right UE    Edema: left UE shoulder to MP's, instructed on exercises    Hearing: Auditory  Auditory Impairment: None    Vision/Perceptual:       Not assessed                              Range of Motion:  AROM: Generally decreased, functional(decreased left  due to edema)      decreased left shoulder flexion to reach for bed rail                   Strength:  Strength: Generally decreased, functional                Coordination:     Fine Motor Skills-Upper: Left Impaired;Right Intact    Gross Motor Skills-Upper: Left Impaired;Right Intact    Tone & Sensation:   Tone: normal   Sensation: intact           Balance:  Sitting: Impaired    Functional Mobility and Transfers for ADLs:  Bed Mobility:  Rolling: Total assistance;Maximum assistance; Other (comment)(minimal effort from patient to perform)  Scooting: Moderate assistance; Additional time; Other (comment)(to scoot to head, instructed on bridge/use of bedrail)    Transfers:  Sit to Stand: Other (comment)(unable with assit of one and unsafe due to lack of effort)  Bed to Chair: Total assistance  Bathroom Mobility: Dependent/total assistance  Toilet Transfer : Total assistance    ADL Assessment:  Feeding: Minimum assistance; Other (comment)(requesting assist)    Oral Facial Hygiene/Grooming: Minimum assistance    Bathing: Total assistance    Upper Body Dressing:  Total assistance    Lower Body Dressing: Total assistance    Toileting: Total assistance(bed level)                ADL Intervention and task modifications:     Educated on role of OT, need to increase activity and exercise even at bed level, received with head of bed elevated right LE falling out of the bed on right and Mom helping to move her left foot. Instructed on need for patient to do exercises as she is the one that needs to get stronger      Educated on repositioning in bed, skin protection and positioning of bed in chair position    Performed joint mobilization left UE at radius/ulna, MP joints to decrease edema and increase AROM       Cognitive Retraining  Safety/Judgement: Not assessed    Therapeutic Exercise: Instructed on bilateral ankle pumps and potential for blood clots  Noted increased edema left UE, instructed in gross grasp and release with elbow at 90 degrees flexion, completed 10 reps  Instructed family in positioning of head of bed flat and assist to place LE's in bridge position, instructed to squeeze LE's together and hold, then instructed to elevate hips, completed 6 reps, instructed on breathing technique throughout to prevent holding breath  Instructed on isometric scapular retraction, shoulder extension and elbow extension using bed for resistance. Instructed to perform some type of exercise/movement every 15 minutes    Functional Measure:  Barthel Index:    Bathin  Bladder: 0  Bowels: 5  Groomin  Dressin  Feedin  Mobility: 0  Stairs: 0  Toilet Use: 0  Transfer (Bed to Chair and Back): 0  Total: 10/100        Percentage of impairment   0%   1-19%   20-39%   40-59%   60-79%   80-99%   100%   Barthel Score 0-100 100 99-80 79-60 59-40 20-39 1-19   0     The Barthel ADL Index: Guidelines  1. The index should be used as a record of what a patient does, not as a record of what a patient could do.   2. The main aim is to establish degree of independence from any help, physical or verbal, however minor and for whatever reason. 3. The need for supervision renders the patient not independent. 4. A patient's performance should be established using the best available evidence. Asking the patient, friends/relatives and nurses are the usual sources, but direct observation and common sense are also important. However direct testing is not needed. 5. Usually the patient's performance over the preceding 24-48 hours is important, but occasionally longer periods will be relevant. 6. Middle categories imply that the patient supplies over 50 per cent of the effort. 7. Use of aids to be independent is allowed. Miguelina Alvarez., Barthel, D.W. (3089). Functional evaluation: the Barthel Index. [address] [facility] (14)2. Ritu Kate lakshmi ASHUTOSH Beckford, Beth Kramer, Scooby Ochoa., [facility], [address] [place] [address] (1999). Measuring the change indisability after inpatient rehabilitation; comparison of the responsiveness of the Barthel Index and Functional [place] Measure. Journal of Neurology, Neurosurgery, and Psychiatry, 66(4), 382-618. [place] Patience, BERNA, LANIE Calixto, & Cuco Cummins, MMirianA. (2004.) Assessment of post-stroke quality of life in cost-effectiveness studies: The usefulness of the Barthel Index and the EuroQoL-5D. Quality of Life Research, 15, 265-95         Occupational Therapy Evaluation Charge Determination   History Examination Decision-Making   LOW Complexity : Brief history review  HIGH Complexity : 5 or more performance deficits relating to physical, cognitive , or psychosocial skils that result in activity limitations and / or participation restrictions MEDIUM Complexity : Patient may present with comorbidities that affect occupational performnce.  Miniml to moderate modification of tasks or assistance (eg, physical or verbal ) with assesment(s) is necessary to enable patient to complete evaluation       Based on the above components, the patient evaluation is determined to be of the following complexity level: LOW   Pain:  No report of pain or sign of    Activity Tolerance:   Poor, minimal effort exerted  Please refer to the flowsheet for vital signs taken during this treatment. After treatment:   [] Patient left in no apparent distress sitting up in chair  [x] Patient left in no apparent distress in bed in chair position  [x] Call bell left within reach  [x] Nursing notified  [x] Caregiver present  [] Bed alarm activated    COMMUNICATION/EDUCATION:   The patients plan of care was discussed with: Registered Nurse. [x] Home safety education was provided and the patient/caregiver indicated understanding. [x] Patient/family have participated as able in goal setting and plan of care. [] Patient/family agree to work toward stated goals and plan of care. [] Patient understands intent and goals of therapy, but is neutral about his/her participation. [] Patient is unable to participate in goal setting and plan of care. This patients plan of care is appropriate for delegation to [facility].     Thank you for this referral.  Drake Hernandez, OTR/L  Time Calculation: 50 mins

## 2019-03-14 LAB
ALBUMIN SERPL-MCNC: 1.9 G/DL (ref 3.5–5)
ALBUMIN/GLOB SERPL: 0.7 {RATIO} (ref 1.1–2.2)
ALP SERPL-CCNC: 39 U/L (ref 45–117)
ALT SERPL-CCNC: 467 U/L (ref 12–78)
ANION GAP SERPL CALC-SCNC: 8 MMOL/L (ref 5–15)
AST SERPL-CCNC: 459 U/L (ref 15–37)
BILIRUB SERPL-MCNC: 0.4 MG/DL (ref 0.2–1)
BUN SERPL-MCNC: 32 MG/DL (ref 6–20)
BUN/CREAT SERPL: 30 (ref 12–20)
CALCIUM SERPL-MCNC: 9 MG/DL (ref 8.5–10.1)
CHLORIDE SERPL-SCNC: 109 MMOL/L (ref 97–108)
CO2 SERPL-SCNC: 24 MMOL/L (ref 21–32)
CREAT SERPL-MCNC: 1.07 MG/DL (ref 0.55–1.02)
GLOBULIN SER CALC-MCNC: 2.9 G/DL (ref 2–4)
GLUCOSE SERPL-MCNC: 125 MG/DL (ref 65–100)
MAGNESIUM SERPL-MCNC: 1.6 MG/DL (ref 1.6–2.4)
PHOSPHATE SERPL-MCNC: 1.6 MG/DL (ref 2.6–4.7)
POTASSIUM SERPL-SCNC: 3 MMOL/L (ref 3.5–5.1)
PROT SERPL-MCNC: 4.8 G/DL (ref 6.4–8.2)
SODIUM SERPL-SCNC: 141 MMOL/L (ref 136–145)

## 2019-03-14 PROCEDURE — 74011250636 HC RX REV CODE- 250/636: Performed by: NURSE PRACTITIONER

## 2019-03-14 PROCEDURE — 74011250636 HC RX REV CODE- 250/636: Performed by: INTERNAL MEDICINE

## 2019-03-14 PROCEDURE — 74011250637 HC RX REV CODE- 250/637: Performed by: INTERNAL MEDICINE

## 2019-03-14 PROCEDURE — 36415 COLL VENOUS BLD VENIPUNCTURE: CPT

## 2019-03-14 PROCEDURE — 80053 COMPREHEN METABOLIC PANEL: CPT

## 2019-03-14 PROCEDURE — 84100 ASSAY OF PHOSPHORUS: CPT

## 2019-03-14 PROCEDURE — 97530 THERAPEUTIC ACTIVITIES: CPT

## 2019-03-14 PROCEDURE — 97535 SELF CARE MNGMENT TRAINING: CPT

## 2019-03-14 PROCEDURE — 83735 ASSAY OF MAGNESIUM: CPT

## 2019-03-14 PROCEDURE — 65660000000 HC RM CCU STEPDOWN

## 2019-03-14 RX ORDER — SODIUM,POTASSIUM PHOSPHATES 280-250MG
2 POWDER IN PACKET (EA) ORAL 2 TIMES DAILY
Status: DISPENSED | OUTPATIENT
Start: 2019-03-14 | End: 2019-03-15

## 2019-03-14 RX ORDER — SODIUM CHLORIDE 9 MG/ML
75 INJECTION, SOLUTION INTRAVENOUS CONTINUOUS
Status: DISCONTINUED | OUTPATIENT
Start: 2019-03-14 | End: 2019-03-15

## 2019-03-14 RX ORDER — MAGNESIUM SULFATE HEPTAHYDRATE 40 MG/ML
2 INJECTION, SOLUTION INTRAVENOUS ONCE
Status: COMPLETED | OUTPATIENT
Start: 2019-03-14 | End: 2019-03-14

## 2019-03-14 RX ADMIN — Medication 10 ML: at 14:02

## 2019-03-14 RX ADMIN — Medication 10 ML: at 06:00

## 2019-03-14 RX ADMIN — HYDROMORPHONE HYDROCHLORIDE 1 MG: 2 INJECTION, SOLUTION INTRAMUSCULAR; INTRAVENOUS; SUBCUTANEOUS at 14:00

## 2019-03-14 RX ADMIN — HYDROMORPHONE HYDROCHLORIDE 1 MG: 2 INJECTION, SOLUTION INTRAMUSCULAR; INTRAVENOUS; SUBCUTANEOUS at 18:56

## 2019-03-14 RX ADMIN — SODIUM CHLORIDE 75 ML/HR: 900 INJECTION, SOLUTION INTRAVENOUS at 13:19

## 2019-03-14 RX ADMIN — Medication 10 ML: at 22:44

## 2019-03-14 RX ADMIN — HYDROMORPHONE HYDROCHLORIDE 1 MG: 2 INJECTION, SOLUTION INTRAMUSCULAR; INTRAVENOUS; SUBCUTANEOUS at 00:00

## 2019-03-14 RX ADMIN — SODIUM CHLORIDE 75 ML/HR: 900 INJECTION, SOLUTION INTRAVENOUS at 08:54

## 2019-03-14 RX ADMIN — Medication 10 ML: at 04:46

## 2019-03-14 RX ADMIN — Medication 10 ML: at 14:03

## 2019-03-14 RX ADMIN — HYDROMORPHONE HYDROCHLORIDE 1 MG: 2 INJECTION, SOLUTION INTRAMUSCULAR; INTRAVENOUS; SUBCUTANEOUS at 08:53

## 2019-03-14 RX ADMIN — HYDROMORPHONE HYDROCHLORIDE 1 MG: 2 INJECTION, SOLUTION INTRAMUSCULAR; INTRAVENOUS; SUBCUTANEOUS at 04:45

## 2019-03-14 RX ADMIN — Medication 20 ML: at 00:01

## 2019-03-14 RX ADMIN — ENOXAPARIN SODIUM 40 MG: 40 INJECTION SUBCUTANEOUS at 18:52

## 2019-03-14 RX ADMIN — MAGNESIUM SULFATE HEPTAHYDRATE 2 G: 40 INJECTION, SOLUTION INTRAVENOUS at 08:54

## 2019-03-14 NOTE — PROGRESS NOTES
Problem: Self Care Deficits Care Plan (Adult)  Goal: *Acute Goals and Plan of Care (Insert Text)  Occupational Therapy Goals  Initiated 3/13/2019  1. Patient will perform self-feeding with independence within 7 day(s). 2.  Patient will perform grooming with independence within 7 day(s). 3.  Patient will perform upper body dressing with independence within 7 day(s). 4.  Patient will perform toilet transfers with moderate assistance for stand pivot transfer within 7 day(s). 5.  Patient will perform all aspects of toileting with moderate assistance  within 7 day(s). 6.  Patient will participate in upper extremity therapeutic exercise/activities with supervision/set-up for 10 minutes within 7 day(s). 7.  Patient will stand for functional task unsupported > or = 5 minutes within 7 day(s). Occupational Therapy TREATMENT  Patient: Eva Archuleta (61 y.o. female)  Date: 3/14/2019  Diagnosis: Rhabdomyolysis [M62.82] Rhabdomyolysis  Procedure(s) (LRB):  LEFT THIGH MUSCLE BIOPSY (Left) 3 Days Post-Op  Precautions: Fall  Chart, occupational therapy assessment, plan of care, and goals were reviewed. ASSESSMENT:   The patient presents with Maximum assistance upper body ADLs, Total assistance lower body ADLs, and Moderate Assistance, Maximum assistance and Assist x2 assist functional mobility. The following are barriers to independence with ADLs while in acute care:   - Cognitive and/or behavioral: fear of falling  - Medical condition: ROM, strength, functional reach, functional endurance, sitting balance, standing balance, pain tolerance, girth, medical history and edema bilateral UEs and LEs.    - Other:       Prior level of function: Independent, driving, working as a ,         PLAN:  Patient continues to benefit from skilled intervention to address the above impairments. Continue treatment per established plan of care.   Recommendations and/or planned interventions for staff:  LEAH and LE self care, sitting and standing endurance and balance, reach to LEs, strengthening, transition to use of BSC for toileting. Discharge recommendations: Rehab at inpatient facility: patient can tolerate 3 hours of therapy  If above is not an option then recommend: Rehab at skilled nursing facility (SNF), but based on progress    Barriers to discharging home, in addition to above listed impairments: entry and exit into the home, patient will require physical assist from medical transport/ambulance  level of physical assist required to maintain patient safety. Equipment recommendations for successful discharge (if) home: none     SUBJECTIVE:   Patient stated I want to lie down.     OBJECTIVE DATA SUMMARY:   Cognitive/Behavioral Status:  Neurologic State: Alert  Orientation Level: Oriented X4  Cognition: Follows commands; Appropriate for age attention/concentration; Appropriate safety awareness  Perception: Appears intact     Safety/Judgement: Awareness of environment    Functional Mobility and Transfers for ADLs:  Bed Mobility:  Supine to Sit: Moderate assistance;Assist x2(assist at trunk and LEs)  Scooting: Moderate assistance    Transfers:  Sit to Stand: Moderate assistance;Assist x2;Maximum assistance     Bed to Chair: Moderate assistance;Assist x2    Balance:  Sitting: Intact  Standing: Impaired; With support  Standing - Static: Poor  Standing - Dynamic : Poor    ADL Intervention:       Grooming  Grooming Assistance: Set-up(supported sitting)    Upper Body Bathing  Bathing Assistance: Maximum assistance(inferred from mobility and endurance)  Position Performed: Seated in chair    Lower Body Bathing  Bathing Assistance: Total assistance(dependent)  Perineal  : Total assistance (dependent)  Position Performed: Standing;Seated in chair  Lower Body : Total assistance (dependent)  Position Performed: Seated in chair    Upper [address]:  Moderate assistance    Lower Body Dressing Assistance  Pants With Elastic Waist: Total assistance(dependent)(inferred from mobiltiy)  Socks: Total assistance (dependent)  Leg Crossed Method Used: No  Position Performed: Seated in chair;Standing  Adaptive Equipment Used: Walker         Cognitive Retraining  Organizing/Sequencing: Breaking task down  Following Commands: Follows one step commands/directions  Safety/Judgement: Awareness of environment  Cues: Verbal cues provided; Tactile cues provided    Therapeutic Exercise: Patient and  instructed in AROM for bilateral UEs off and on throughout the day to increase strength and endurance for functional activity. Both also instructed that patient needs to use arms of chair for mobilizing , not pulling up on . Activity Tolerance:   Fair  Please refer to the flowsheet for vital signs taken during this treatment.     After treatment patient left:   Up in recliner with LEs elevated  Call light within reach  RN notified  Family at bedside     COMMUNICATION/COLLABORATION:   The patients plan of care was discussed with: Physical Therapist and Registered Nurse    RAFFY Stout  Time Calculation: 45 mins

## 2019-03-14 NOTE — PROGRESS NOTES
The patient is being transferred to [facility] to bed #537. Report was given to receiving nurse Dallas Hall RN. All personal possessions sent with the patient. The patient was informed of the transfer.

## 2019-03-14 NOTE — PROGRESS NOTES
Problem: Pressure Injury - Risk of  Goal: *Prevention of pressure injury  Document Jacky Scale and appropriate interventions in the flowsheet. Outcome: Progressing Towards Goal  Pressure Injury Interventions:  Sensory Interventions: Assess changes in LOC    Moisture Interventions: Absorbent underpads, Check for incontinence Q2 hours and as needed, Minimize layers, Offer toileting Q_hr    Activity Interventions: Pressure redistribution bed/mattress(bed type), PT/OT evaluation    Mobility Interventions: HOB 30 degrees or less, Turn and reposition approx. every two hours(pillow and wedges)    Nutrition Interventions: Document food/fluid/supplement intake    Friction and Shear Interventions: HOB 30 degrees or less, Minimize layers               Problem: Falls - Risk of  Goal: *Absence of Falls  Document Alicia Fall Risk and appropriate interventions in the flowsheet.   Outcome: Progressing Towards Goal  Fall Risk Interventions:  Mobility Interventions: Patient to call before getting OOB, Communicate number of staff needed for ambulation/transfer, OT consult for ADLs, PT Consult for mobility concerns, PT Consult for assist device competence    Mentation Interventions: Adequate sleep, hydration, pain control, Door open when patient unattended, Family/sitter at bedside, Toileting rounds, Update white board    Medication Interventions: Patient to call before getting OOB, Teach patient to arise slowly    Elimination Interventions: Call light in reach, Elevated toilet seat, Patient to call for help with toileting needs    History of Falls Interventions: Door open when patient unattended

## 2019-03-14 NOTE — PROGRESS NOTES
Patient does not want to go to a rehab facility, her  told MD that he can take care of her at home. CM canceled referral to [facility], and will monitor for progress. 1458: CM discussed with patient and her  that referral to [facility] had been canceled, and the goal is for her to go home. CM to provide [facility] agencies at bedside and send a referral when patient is near discharge.     Ruben Raya [facility]

## 2019-03-14 NOTE — PROGRESS NOTES
Problem: Mobility Impaired (Adult and Pediatric)  Goal: *Acute Goals and Plan of Care (Insert Text)  Physical Therapy Goals  Initiated 3/7/2019; goals reviewed and continued 3/14/19  1. Patient will move from supine to sit and sit to supine  in bed with modified independence within 7 day(s). 2.  Patient will transfer from bed to chair and chair to bed with CGA using the least restrictive device within 7 day(s). 3.  Patient will perform sit to stand with contact guard assist within 7 day(s). 4.  Patient will ambulate with minimal assistance for 25 feet with the least restrictive device within 7 day(s). physical Therapy TREATMENT: WEEKLY REASSESSMENT  Patient: Nico Saab (34 y.o. female)  Date: 3/14/2019  Diagnosis: Rhabdomyolysis [M62.82] Rhabdomyolysis  Procedure(s) (LRB):  LEFT THIGH MUSCLE BIOPSY (Left) 3 Days Post-Op  Precautions: Fall  Chart, physical therapy assessment, plan of care and goals were reviewed. ASSESSMENT:  Pt seen just after receiving IV pain med. Flat affect with minimal interaction with therapists. Noted ongoing edema UEs/ LEs. Pt continues to require 2 person assist with bed mob and transfer, but appears to demo improved effort as compared to prior sessions. Pt completed bed mob with mod A x 2, transfer sit to stand min A x 2, bed to chair stand pivot min A x 2.   at rest up to 141 with transfer; recovered quickly to low 100s with seated rest.      Pt indep with functional mobility at baseline. Continues to present significantly below PLOF; recommend follow up rehab. Patient's progression toward goals since last assessment: pt with slow progress toward mobility goals due to decreased effort during prior sessions and self limiting behavior. PLAN:  Goals have been updated based on progression since last assessment. Patient continues to benefit from skilled intervention to address the above impairments.   Continue to follow the patient 5 times a week to address goals. Planned Interventions:  [x]              Bed Mobility Training             []       Neuromuscular Re-Education  [x]              Transfer Training                   []       Orthotic/Prosthetic Training  [x]              Gait Training                         []       Modalities  [x]              Therapeutic Exercises           []       Edema Management/Control  [x]              Therapeutic Activities            [x]       Patient and Family Training/Education  []              Other (comment):  Discharge Recommendations: Rehab  Further Equipment Recommendations for Discharge: TBD     SUBJECTIVE:   Patient stated I'm about to fall, so lets just get to the chair.     OBJECTIVE DATA SUMMARY:   Critical Behavior:  Neurologic State: Alert  Orientation Level: Oriented X4  Cognition: Follows commands  Safety/Judgement: Not assessed    Strength:                          Functional Mobility Training:  Bed Mobility:     Supine to Sit: Moderate assistance;Assist x2(assist at trunk and LEs)     Scooting: Moderate assistance        Transfers:  Sit to Stand: Minimum assistance;Assist x2(HHA x 2 for lift/ balance)  Stand to Sit: Contact guard assistance(use of arm rest); mild post lean. Pt c/o feeling like LEs are \"not mine\"        Bed to Chair: Minimum assistance;Assist x2(stand pivot to L, min A x 2 for balance); wide MARVIN with short/ waddling steps to complete pivot                    Balance:  Sitting: Intact  Standing: Impaired; With support  Standing - Static: Fair  Standing - Dynamic : Poor  Ambulation/Gait Training:                                                         Pain:  Pain Scale 1: Numeric (0 - 10)  Pain Intensity 1: 7  Pain Location 1: Generalized     Pain Description 1: Aching  Pain Intervention(s) 1: Medication (see MAR)  Activity Tolerance:   Pt tolerated OOB to chair. Demo increased HR throughout session, RN aware.   Please refer to the flowsheet for vital signs taken during this treatment.   After treatment:   [x]  Patient left in no apparent distress sitting up in chair  []  Patient left in no apparent distress in bed  [x]  Call bell left within reach  [x]  Nursing notified  []  Caregiver present  []  Bed alarm activated    COMMUNICATION/COLLABORATION:   The patients plan of care was discussed with: Physical Therapy Assistant and Registered Nurse    Richard Sexton, PT   Time Calculation: 15 mins

## 2019-03-14 NOTE — PROGRESS NOTES
Hospitalist Progress Note  Riley Barrera MD  Answering service: [phone] from in house phone  Cell: [phone]   Date of Service:  3/14/2019  NAME:  Skinny Rider  :  1990  MRN:  [id number]    Admission Summary:   Pt presented to the ED with generalized body aches and soreness with any movement. She has a hx of rhabdomyolysis x 2 other times in her life. Reported a mild cold but denied ay strenuous activity or heavy exertion. She hasn't started any new prescription meds, but started taking a weight loss pill called \"thermofight-x\" ~ 1 week PTA. She also reported she noticed her urine had turned a dark brown color. Due to her hx of rhabdo, she noted these symptoms and came to the ED with expectation of the diagnosis. Interval history / Subjective:   Pt today got to the chair, which was great, but asking to go back to bed because feeling uncomfortable. Asked to get a recliner. Slowly improving, but Cr has been slightly going up, so restarted fluids. Praised on her ability to get to the chair today, and recommended to continue working with PT. 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 - significant hypokalemia on admit which could precipitate rhabdo. She also started taking a weight-loss pill called Thermofight-X which contains multiple ingredients including chromium and a \"proprietery blend. \"   - UDS negative and denies any sort of illicit drug   - monitoring electrolytes  - pain controlled - dilaudid PRN, space to every 4 hours. - TSH 0.30; JAMARCUS negative  - muscle bx done 3/11, result pending  - lovenox restarted 3/11   - CK continues to trend down  - Restart IVF      Tachycardia:  - onset 3/10 morning, -130's - still elevated today with no other symptoms  - no pain, no shortness of breath or hypoxia. No fevers.   - EKG appeared Sinus Tach  - + leukocytosis but no fevers  - improved some with lasix, but have to consider CTA, will discuss with patient. Transaminitis: Monitor daily - improving    Hypokalemia: Resolved    Hypocalcemia: Resolved  - Calcium has corrected, pt is still declining tums    Hypophosphatemia: replacing, however patient intermittently refusing. ITZ: resolved, Watching creatinine closley with slightly elevated cr today   - nephrology following  - Continue to monitor    Loose Stool: Resolved    Code status: Full  DVT prophylaxis: Lovenox  Care Plan discussed with: patient/pts , nurse, attending MD  Disposition: home when able. PT/OT. Hospital Problems  Date Reviewed: 5/12/2016          Codes Class Noted POA    Hypokalemia ICD-10-CM: E87.6  ICD-9-CM: 276.8  3/4/2019 Yes        * (Principal) Rhabdomyolysis ICD-10-CM: V35.14  ICD-9-CM: 728.88  5/4/2016 Yes            Review of Systems:   No headache. No chest pain, pressure or palpitations. No shortness of breath or hypoxia. Intermittent nausea and vomiting. Vital Signs:    Last 24hrs VS reviewed since prior progress note. Most recent are:  Visit Vitals  /83 (BP 1 Location: Left arm, BP Patient Position: At rest)   Pulse (!) 103   Temp 97.9 °F (36.6 °C)   Resp 20   Ht 5' 3\" (1.6 m)   Wt 130.4 kg (287 lb 7.7 oz)   SpO2 95%   BMI 50.92 kg/m²       Intake/Output Summary (Last 24 hours) at 3/14/2019 1153  Last data filed at 3/14/2019 0854  Gross per 24 hour   Intake 60 ml   Output 600 ml   Net -540 ml      Physical Examination:         Constitutional:  Cooperative, pleasant. Resp:  CTA bilaterally. No wheezing. No accessory muscle use and on RA. CV:  Regular rhythm, tachycardic rate. No murmurs. GI:  Obese. Non distended, non tender. Normoactive bowel sounds. Musculoskeletal:  Generalized edema to all extremities     Neurologic:  Moves all extremities slowly. AAOx3. Sensation is intact. No focal deficits   Lines:  Holley present, draining clear light yellow urine   PICC: Placed 3/6 (right)      Data Review:   Labs Reviewed on 3/12, imaging reviewed. Labs:     Recent Labs     03/13/19  0552 03/12/19  0342   WBC 11.0 14.4*   HGB 10.9* 11.2*   HCT 34.9* 36.0    247     Recent Labs     03/14/19  0435 03/13/19  0552 03/12/19  2318 03/12/19  0331    145 144 144   K 3.0* 4.5 4.5 4.3   * 115* 114* 115*   CO2 24 20* 21 20*   BUN 32* 29* 28* 24*   CREA 1.07* 0.99 0.89 0.91   * 101* 93 106*   CA 9.0 8.7 8.3* 8.7   MG 1.6 1.4*  --   --    PHOS 1.6* 2.0*  --  1.4*     Recent Labs     03/14/19 0435 03/13/19 0552 03/12/19 0331   SGOT 459* 597* 1,001*   * 573* 696*   AP 39* 47 45   TBILI 0.4 0.3 0.3   TP 4.8* 4.8* 4.6*   ALB 1.9* 1.9* 1.7*   GLOB 2.9 2.9 2.9     No results for input(s): INR, PTP, APTT in the last 72 hours. No lab exists for component: INREXT, INREXT   No results for input(s): FE, TIBC, PSAT, FERR in the last 72 hours. No results found for: FOL, RBCF   No results for input(s): PH, PCO2, PO2 in the last 72 hours.   Recent Labs     03/13/19 0552 03/12/19 0331   CPK 17,840* 41,100*     No results found for: CHOL, CHOLX, CHLST, CHOLV, HDL, LDL, LDLC, DLDLP, TGLX, TRIGL, TRIGP, CHHD, CHHDX  Lab Results   Component Value Date/Time    Glucose (POC) 92 04/28/2013 11:56 AM     Lab Results   Component Value Date/Time    Color RED 03/07/2019 05:09 AM    Appearance TURBID (A) 03/07/2019 05:09 AM    Specific gravity 1.022 03/07/2019 05:09 AM    Specific gravity 1.025 03/04/2019 12:14 AM    pH (UA) 6.0 03/07/2019 05:09 AM    Protein NEGATIVE  03/07/2019 05:09 AM    Glucose NEGATIVE  03/07/2019 05:09 AM    Ketone NEGATIVE  03/07/2019 05:09 AM    Bilirubin NEGATIVE  05/26/2016 07:42 PM    Urobilinogen 1.0 03/07/2019 05:09 AM    Nitrites POSITIVE (A) 03/07/2019 05:09 AM    Leukocyte Esterase NEGATIVE  03/07/2019 05:09 AM    Epithelial cells FEW 03/07/2019 05:09 AM    Bacteria NEGATIVE  03/07/2019 05:09 AM    WBC 0-4 03/07/2019 05:09 AM    RBC 5-10 03/07/2019 05:09 AM     Medications Reviewed:     Current Facility-Administered Medications   Medication Dose Route Frequency    potassium, sodium phosphates (NEUTRA-PHOS) packet 2 Packet  2 Packet Oral BID    0.9% sodium chloride infusion  75 mL/hr IntraVENous CONTINUOUS    oxyCODONE (ROXICODONE) 5 mg/5 mL oral solution 5 mg  5 mg Oral Q4H PRN    HYDROmorphone (PF) (DILAUDID) injection 1 mg  1 mg IntraVENous Q4H PRN    bacitracin 500 unit/gram packet 1 Packet  1 Packet Topical PRN    sodium chloride (NS) flush 5-40 mL  5-40 mL IntraVENous Q8H    sodium chloride (NS) flush 5-40 mL  5-40 mL IntraVENous PRN    enoxaparin (LOVENOX) injection 40 mg  40 mg SubCUTAneous Q24H    sodium chloride (NS) flush 20 mL  20 mL InterCATHeter PRN    sodium chloride (NS) flush 10 mL  10 mL InterCATHeter Q24H    sodium chloride (NS) flush 10 mL  10 mL InterCATHeter PRN    sodium chloride (NS) flush 10 mL  10 mL InterCATHeter Q8H    alteplase (CATHFLO) 1 mg in sterile water (preservative free) 1 mL injection  1 mg InterCATHeter PRN    calcium carbonate (TUMS) chewable tablet 400 mg [elemental]  400 mg Oral TID WITH MEALS    sodium chloride (NS) flush 5-40 mL  5-40 mL IntraVENous Q8H    sodium chloride (NS) flush 5-40 mL  5-40 mL IntraVENous PRN    acetaminophen (TYLENOL) tablet 500 mg  500 mg Oral Q6H PRN    ondansetron (ZOFRAN) injection 4 mg  4 mg IntraVENous Q4H PRN   ______________________________________________________________________  EXPECTED LENGTH OF STAY: 3d 4h  ACTUAL LENGTH OF STAY:          10               Nirav Castano MD

## 2019-03-14 NOTE — PROGRESS NOTES
Bedside and Verbal shift change report given to Shantell Plasencia (oncoming nurse) by Jersey Hill (offgoing nurse). Report included the following information SBAR, Kardex, Procedure Summary, MAR, Recent Results, Med Rec Status and Cardiac Rhythm Sinus Tach.

## 2019-03-15 LAB
ANION GAP SERPL CALC-SCNC: 5 MMOL/L (ref 5–15)
BUN SERPL-MCNC: 27 MG/DL (ref 6–20)
BUN/CREAT SERPL: 29 (ref 12–20)
CALCIUM SERPL-MCNC: 9 MG/DL (ref 8.5–10.1)
CHLORIDE SERPL-SCNC: 108 MMOL/L (ref 97–108)
CK SERPL-CCNC: ABNORMAL U/L (ref 26–192)
CO2 SERPL-SCNC: 28 MMOL/L (ref 21–32)
CREAT SERPL-MCNC: 0.94 MG/DL (ref 0.55–1.02)
GLUCOSE SERPL-MCNC: 109 MG/DL (ref 65–100)
MAGNESIUM SERPL-MCNC: 1.8 MG/DL (ref 1.6–2.4)
PHOSPHATE SERPL-MCNC: 1.5 MG/DL (ref 2.6–4.7)
POTASSIUM SERPL-SCNC: 2.9 MMOL/L (ref 3.5–5.1)
SODIUM SERPL-SCNC: 141 MMOL/L (ref 136–145)

## 2019-03-15 PROCEDURE — 74011250636 HC RX REV CODE- 250/636: Performed by: NURSE PRACTITIONER

## 2019-03-15 PROCEDURE — 74011250636 HC RX REV CODE- 250/636: Performed by: INTERNAL MEDICINE

## 2019-03-15 PROCEDURE — 80048 BASIC METABOLIC PNL TOTAL CA: CPT

## 2019-03-15 PROCEDURE — 36415 COLL VENOUS BLD VENIPUNCTURE: CPT

## 2019-03-15 PROCEDURE — 82550 ASSAY OF CK (CPK): CPT

## 2019-03-15 PROCEDURE — 83735 ASSAY OF MAGNESIUM: CPT

## 2019-03-15 PROCEDURE — 97530 THERAPEUTIC ACTIVITIES: CPT

## 2019-03-15 PROCEDURE — 65270000029 HC RM PRIVATE

## 2019-03-15 PROCEDURE — 74011000250 HC RX REV CODE- 250: Performed by: INTERNAL MEDICINE

## 2019-03-15 PROCEDURE — 74011250636 HC RX REV CODE- 250/636: Performed by: HOSPITALIST

## 2019-03-15 PROCEDURE — 84100 ASSAY OF PHOSPHORUS: CPT

## 2019-03-15 RX ORDER — POTASSIUM CHLORIDE 750 MG/1
40 TABLET, FILM COATED, EXTENDED RELEASE ORAL 2 TIMES DAILY
Status: DISCONTINUED | OUTPATIENT
Start: 2019-03-15 | End: 2019-03-16 | Stop reason: SDUPTHER

## 2019-03-15 RX ORDER — FUROSEMIDE 10 MG/ML
20 INJECTION INTRAMUSCULAR; INTRAVENOUS ONCE
Status: COMPLETED | OUTPATIENT
Start: 2019-03-15 | End: 2019-03-15

## 2019-03-15 RX ADMIN — HYDROMORPHONE HYDROCHLORIDE 1 MG: 2 INJECTION, SOLUTION INTRAMUSCULAR; INTRAVENOUS; SUBCUTANEOUS at 13:32

## 2019-03-15 RX ADMIN — ONDANSETRON HYDROCHLORIDE 4 MG: 2 INJECTION INTRAMUSCULAR; INTRAVENOUS at 13:32

## 2019-03-15 RX ADMIN — HYDROMORPHONE HYDROCHLORIDE 1 MG: 2 INJECTION, SOLUTION INTRAMUSCULAR; INTRAVENOUS; SUBCUTANEOUS at 00:24

## 2019-03-15 RX ADMIN — Medication 10 ML: at 13:37

## 2019-03-15 RX ADMIN — Medication 10 ML: at 22:09

## 2019-03-15 RX ADMIN — HYDROMORPHONE HYDROCHLORIDE 1 MG: 2 INJECTION, SOLUTION INTRAMUSCULAR; INTRAVENOUS; SUBCUTANEOUS at 17:55

## 2019-03-15 RX ADMIN — HYDROMORPHONE HYDROCHLORIDE 1 MG: 2 INJECTION, SOLUTION INTRAMUSCULAR; INTRAVENOUS; SUBCUTANEOUS at 09:31

## 2019-03-15 RX ADMIN — Medication 10 ML: at 22:10

## 2019-03-15 RX ADMIN — HYDROMORPHONE HYDROCHLORIDE 1 MG: 2 INJECTION, SOLUTION INTRAMUSCULAR; INTRAVENOUS; SUBCUTANEOUS at 04:50

## 2019-03-15 RX ADMIN — Medication 10 ML: at 13:38

## 2019-03-15 RX ADMIN — ENOXAPARIN SODIUM 40 MG: 40 INJECTION SUBCUTANEOUS at 17:59

## 2019-03-15 RX ADMIN — ONDANSETRON HYDROCHLORIDE 4 MG: 2 INJECTION INTRAMUSCULAR; INTRAVENOUS at 17:56

## 2019-03-15 RX ADMIN — ONDANSETRON HYDROCHLORIDE 4 MG: 2 INJECTION INTRAMUSCULAR; INTRAVENOUS at 00:26

## 2019-03-15 RX ADMIN — HYDROMORPHONE HYDROCHLORIDE 1 MG: 2 INJECTION, SOLUTION INTRAMUSCULAR; INTRAVENOUS; SUBCUTANEOUS at 22:10

## 2019-03-15 RX ADMIN — SODIUM PHOSPHATE, MONOBASIC, MONOHYDRATE: 276; 142 INJECTION, SOLUTION INTRAVENOUS at 14:22

## 2019-03-15 RX ADMIN — ONDANSETRON HYDROCHLORIDE 4 MG: 2 INJECTION INTRAMUSCULAR; INTRAVENOUS at 22:10

## 2019-03-15 RX ADMIN — FUROSEMIDE 20 MG: 10 INJECTION, SOLUTION INTRAMUSCULAR; INTRAVENOUS at 13:32

## 2019-03-15 RX ADMIN — ONDANSETRON HYDROCHLORIDE 4 MG: 2 INJECTION INTRAMUSCULAR; INTRAVENOUS at 09:33

## 2019-03-15 NOTE — PROGRESS NOTES
Pt agreeable to PT, however after start of session prior to any movement, pt projectile vomits across the room. Appears to be ginger ale or some other liquid. Pt nurse informed and therapy deferred.

## 2019-03-15 NOTE — PROGRESS NOTES
Chart reviewed for transitions of care, noted transfer to this floor. Therapy currently recommending home health vs SNF. Patient is uninsured and has no PCP, therefore neither of those options would be possible. In order to have home health, patient would need to become established with a PCP office. [facility] and instructed to do so. Will follow.   Joan ALEJO, ACM

## 2019-03-15 NOTE — PROGRESS NOTES
Problem: Mobility Impaired (Adult and Pediatric)  Goal: *Acute Goals and Plan of Care (Insert Text)  Physical Therapy Goals  Initiated 3/7/2019; goals reviewed and continued 3/14/19  1. Patient will move from supine to sit and sit to supine  in bed with modified independence within 7 day(s). 2.  Patient will transfer from bed to chair and chair to bed with CGA using the least restrictive device within 7 day(s). 3.  Patient will perform sit to stand with contact guard assist within 7 day(s). 4.  Patient will ambulate with minimal assistance for 25 feet with the least restrictive device within 7 day(s). physical Therapy TREATMENT  Patient: Venkata Callahan (1 y.o. female)  Date: 3/15/2019  Diagnosis: Rhabdomyolysis [M62.82] Rhabdomyolysis  Procedure(s) (LRB):  LEFT THIGH MUSCLE BIOPSY (Left) 4 Days Post-Op  Precautions: Fall  Chart, physical therapy assessment, plan of care and goals were reviewed. ASSESSMENT:  Pt demos fair-well today, intially vomited at start of session, session aborted, then amenable to tx again. Pt demos improved rolling min A, supine to sit with increased participation mod A x1, and sit to stand min A x1. Pt utilized RW to get to the chair with min A and able to control sitting down. Pt requires max cues and mod A to scoot back in the chair, legs elevated for comfort, call bell within reach. Pt sister present and may be beneficial for moral support to improve functional mobility. Progression toward goals:  [x]    Improving appropriately and progressing toward goals  []    Improving slowly and progressing toward goals  []    Not making progress toward goals and plan of care will be adjusted     PLAN:  Patient continues to benefit from skilled intervention to address the above impairments. Continue treatment per established plan of care.   Discharge Recommendations:  Home Health and [address] Recommendations for Discharge:  gait belt and rolling walker     SUBJECTIVE:   Patient stated Im better now, the vomiting just happens sometimes.  regarding previous episode of vomiting approx 20 min prior     OBJECTIVE DATA SUMMARY:   Critical Behavior:  Neurologic State: Alert  Orientation Level: Oriented X4  Cognition: Follows commands  Safety/Judgement: Awareness of environment  Functional Mobility Training:  Bed Mobility:  Rolling: Minimum assistance  Supine to Sit: Moderate assistance     Scooting: Moderate assistance        Transfers:  Sit to Stand: Minimum assistance  Stand to Sit: Minimum assistance(may require assist of 2 for safety for RN staff)        Bed to Chair: Minimum assistance                    Balance:     Ambulation/Gait Training:  Distance (ft): 3 Feet (ft)  Assistive Device: Walker, rolling;Gait belt  Ambulation - Level of Assistance: Minimal assistance        Gait Abnormalities: Shuffling gait; Decreased step clearance;Lurching        Base of Support: Widened     Speed/Roxi: Pace decreased (<100 feet/min); Shuffled; Slow  Step Length: Left shortened;Right shortened     Short small steps to chair, mildly unstable.      Pain:  Pain Scale 1: Numeric (0 - 10)  Pain Intensity 1: 7  Pain Location 1: Generalized  Pain Orientation 1: Other (comment)(all over)  Pain Description 1: Aching  Pain Intervention(s) 1: Medication (see MAR)  Activity Tolerance:   Fair with encouragement and support     After treatment:   [x]    Patient left in no apparent distress sitting up in chair  []    Patient left in no apparent distress in bed  [x]    Call bell left within reach  [x]    Nursing notified  [x]    Caregiver present  []    Bed alarm activated    COMMUNICATION/COLLABORATION:   The patients plan of care was discussed with: Occupational Therapist and Registered Nurse    Evelyn Hall, PT   Time Calculation: 13 mins

## 2019-03-15 NOTE — PROGRESS NOTES
Bedside and Verbal shift change report given to Paco Davidson and Efrem Pablo (oncoming nurse) by Luz Bee (offgoing nurse). Report included the following information RAFAL, Elizabeth and MAR.

## 2019-03-15 NOTE — PROGRESS NOTES
NUTRITION       Nutrition screening completed for LOS day 11. Pt admitted for rhabdomyolosis, has had several admissions for the same. Pt is morbidly obese, significant challenges with her mobility, but no obvious nutritional risk factors at this time. Per notes pt wants to return home (not rehab) when medically ready for discharge. Plan to see patient for rescreen as indicated. Thank you.      Salomón Blue, [address] [place] [address], [address] [place] [address]

## 2019-03-15 NOTE — PROGRESS NOTES
Name: Nico Saab MRN: [id number]   : 1990 Hospital: . [facility]   Date: 3/15/2019        IMPRESSION:   · Severe rhabdomyolysis - now resolving. · ITZ- stable, non oliguric, resolved  · Hemoglobinuria from Rhabdo. · Multiple electrolytes abnormalities  ·   Hypocalcemia- currently asymptomatic. ·   Hyperphosphatemia- on PO4 binders  · Hypervolemia  · Hypokalemia- worse. Patient has a very poor oral intake. PLAN:   · Discontinue IVF's  · Muscle biopsy results are pending. · Replace K     Subjective/Interval History:   I have reviewed the flowsheet and previous days notes. ROS:Pertinent items are noted in HPI. Patient feels better. She had her pain meds. Objective:   Vital Signs:    Visit Vitals  /84 (BP Patient Position: At rest)   Pulse (!) 107   Temp 97.8 °F (36.6 °C)   Resp 18   Ht 5' 3\" (1.6 m)   Wt 130.4 kg (287 lb 7.7 oz)   SpO2 100%   BMI 50.92 kg/m²       O2 Device: Room air   O2 Flow Rate (L/min): 2 l/min   Temp (24hrs), Av.2 °F (36.8 °C), Min:97.8 °F (36.6 °C), Max:98.4 °F (36.9 °C)       Intake/Output:   Last shift:      No intake/output data recorded. Last 3 shifts:  1901 - 03/15 0700  In: 0   Out: 300 [Urine:300]  No intake or output data in the 24 hours ending 03/15/19 1212     Physical Exam:  General:    Alert, cooperative, no distress, appears stated age. Sleepy. Head:   Normocephalic, without obvious abnormality, atraumatic. Eyes:   Conjunctivae/corneas clear. Lungs:   Clear to auscultation bilaterally. No Wheezing or Rhonchi. No rales. Chest wall:  No tenderness or deformity. No Accessory muscle use. Heart:   Regular rate and rhythm,  no murmur, rub or gallop. Abdomen:   Soft, non-tender. Not distended. Bowel sounds normal. No masses. Holley cath is in draining brownish urine. Extremities: Extremities normal, atraumatic, No cyanosis. 2+ edema. No clubbing  Skin:     Texture, turgor normal. No rashes or lesions.   Not Jaundiced  Psych:  Good insight. Not depressed. Not anxious or agitated. Flat affect. Neurologic: Normal strength, Alert and oriented X 3. DATA:  Labs:  Recent Labs     03/15/19  0230 03/14/19  0435 03/13/19  0552    141 145   K 2.9* 3.0* 4.5    109* 115*   CO2 28 24 20*   BUN 27* 32* 29*   CREA 0.94 1.07* 0.99   CA 9.0 9.0 8.7   ALB  --  1.9* 1.9*   PHOS 1.5* 1.6* 2.0*   MG 1.8 1.6 1.4*     Recent Labs     03/13/19  0552   WBC 11.0   HGB 10.9*   HCT 34.9*        No results for input(s): VICKI, KU, CLU, CREAU in the last 72 hours.     No lab exists for component: PROU    Total time spent with patient:  35 minutes    [] Critical Care Provided    Care Plan discussed with:   Family, Medical Team    Itzel Vincent MD

## 2019-03-15 NOTE — PROGRESS NOTES
Chart reviewed and attempted to see patient for OT intervention. Patient refusing to work with OT at this time stating she is tired from everything she has done today. Noted patient with flat affect. Will follow up for OT intervention as able and appropriate. Thank you.

## 2019-03-15 NOTE — PROGRESS NOTES
Hospitalist Progress Note  Julia Arias MD  Answering service: [phone] from in house phone  Cell: [phone]   Date of Service:  3/15/2019  NAME:  Mason Mitchell  :  1990  MRN:  [id number]    Admission Summary:   Pt presented to the ED with generalized body aches and soreness with any movement. She has a hx of rhabdomyolysis x 2 other times in her life. Reported a mild cold but denied ay strenuous activity or heavy exertion. She hasn't started any new prescription meds, but started taking a weight loss pill called \"thermofight-x\" ~ 1 week PTA. She also reported she noticed her urine had turned a dark brown color. Due to her hx of rhabdo, she noted these symptoms and came to the ED with expectation of the diagnosis. Interval history / Subjective:   Up in the chair this afternoon. Had difficult time with PT in the am and had an episode of emesis. Since has resolved. CK slightly higher today. Assessment & Plan:     Rhabdomyolysis: resolving, was severe on admission CK > million etiology is uncertain, but likely genetic, 3rd episode, and family members also have this. - significant hypokalemia on admit which could precipitate rhabdo. She also started taking a weight-loss pill called Thermofight-X which contains multiple ingredients including chromium and a \"proprietery blend. \"   - UDS negative and denies any sort of illicit drug   - monitoring electrolytes  - pain controlled - dilaudid PRN, space to every 4 hours. - TSH 0.30; JAMARCUS negative  - muscle bx done 3/11, result pending  - lovenox restarted 3/11   - CK continues to trend down  - Restart IVF      Tachycardia: improving  - onset 3/10 morning, -130's - still elevated today with no other symptoms  - no pain, no shortness of breath or hypoxia. No fevers.   - EKG appeared Sinus Tach  - + leukocytosis but no fevers  - patient has previously refused CTA    Transaminitis: Monitor daily - improving    Hypokalemia: replete as needed,  monitor daily     Hypocalcemia: Resolved  - Calcium has corrected, pt is still declining tums    Hypophosphatemia: replacing, however patient intermittently refusing. ITZ: resolved, Watching creatinine closley with slightly elevated cr today   - nephrology following  - Continue to monitor    Loose Stool: Resolved    Code status: Full  DVT prophylaxis: Lovenox  Care Plan discussed with: patient/pts , nurse, attending MD  Disposition: home when able. PT/OT. Hospital Problems  Date Reviewed: 5/12/2016          Codes Class Noted POA    Hypokalemia ICD-10-CM: E87.6  ICD-9-CM: 276.8  3/4/2019 Yes        * (Principal) Rhabdomyolysis ICD-10-CM: W21.80  ICD-9-CM: 728.88  5/4/2016 Yes            Review of Systems:   No headache. No chest pain, pressure or palpitations. No shortness of breath or hypoxia. Intermittent nausea and vomiting. Vital Signs:    Last 24hrs VS reviewed since prior progress note. Most recent are:  Visit Vitals  /81 (BP 1 Location: Left arm, BP Patient Position: Sitting)   Pulse (!) 102   Temp 98.5 °F (36.9 °C)   Resp 20   Ht 5' 3\" (1.6 m)   Wt 130.4 kg (287 lb 7.7 oz)   SpO2 95%   BMI 50.92 kg/m²       Intake/Output Summary (Last 24 hours) at 3/15/2019 1551  Last data filed at 3/15/2019 1427  Gross per 24 hour   Intake --   Output 2000 ml   Net -2000 ml      Physical Examination:         Constitutional:  Cooperative, pleasant. Resp:  CTA bilaterally. No wheezing. No accessory muscle use and on RA. CV:  Regular rhythm, tachycardic rate. No murmurs. GI:  Obese. Non distended, non tender. Normoactive bowel sounds. Musculoskeletal:  Generalized edema to all extremities     Neurologic:  Moves all extremities slowly. AAOx3. Sensation is intact. No focal deficits       PICC: Placed 3/6 (right)      Data Review:   Laboratory and imaging data reviewed.      Labs:     Recent Labs 03/13/19  0552   WBC 11.0   HGB 10.9*   HCT 34.9*        Recent Labs     03/15/19  0230 03/14/19  0435 03/13/19  0552    141 145   K 2.9* 3.0* 4.5    109* 115*   CO2 28 24 20*   BUN 27* 32* 29*   CREA 0.94 1.07* 0.99   * 125* 101*   CA 9.0 9.0 8.7   MG 1.8 1.6 1.4*   PHOS 1.5* 1.6* 2.0*     Recent Labs     03/14/19 0435 03/13/19  0552   SGOT 459* 597*   * 573*   AP 39* 47   TBILI 0.4 0.3   TP 4.8* 4.8*   ALB 1.9* 1.9*   GLOB 2.9 2.9     No results for input(s): INR, PTP, APTT in the last 72 hours. No lab exists for component: INREXT, INREXT   No results for input(s): FE, TIBC, PSAT, FERR in the last 72 hours. No results found for: FOL, RBCF   No results for input(s): PH, PCO2, PO2 in the last 72 hours.   Recent Labs     03/15/19  0230 03/13/19  0552   CPK 18,090* 17,840*     No results found for: CHOL, CHOLX, CHLST, CHOLV, HDL, LDL, LDLC, DLDLP, TGLX, TRIGL, TRIGP, CHHD, CHHDX  Lab Results   Component Value Date/Time    Glucose (POC) 92 04/28/2013 11:56 AM     Lab Results   Component Value Date/Time    Color RED 03/07/2019 05:09 AM    Appearance TURBID (A) 03/07/2019 05:09 AM    Specific gravity 1.022 03/07/2019 05:09 AM    Specific gravity 1.025 03/04/2019 12:14 AM    pH (UA) 6.0 03/07/2019 05:09 AM    Protein NEGATIVE  03/07/2019 05:09 AM    Glucose NEGATIVE  03/07/2019 05:09 AM    Ketone NEGATIVE  03/07/2019 05:09 AM    Bilirubin NEGATIVE  05/26/2016 07:42 PM    Urobilinogen 1.0 03/07/2019 05:09 AM    Nitrites POSITIVE (A) 03/07/2019 05:09 AM    Leukocyte Esterase NEGATIVE  03/07/2019 05:09 AM    Epithelial cells FEW 03/07/2019 05:09 AM    Bacteria NEGATIVE  03/07/2019 05:09 AM    WBC 0-4 03/07/2019 05:09 AM    RBC 5-10 03/07/2019 05:09 AM     Medications Reviewed:     Current Facility-Administered Medications   Medication Dose Route Frequency    potassium chloride SR (KLOR-CON 10) tablet 40 mEq  40 mEq Oral BID    sodium phosphate 15 mmol in 0.9% sodium chloride 250 mL infusion   IntraVENous ONCE    oxyCODONE (ROXICODONE) 5 mg/5 mL oral solution 5 mg  5 mg Oral Q4H PRN    HYDROmorphone (PF) (DILAUDID) injection 1 mg  1 mg IntraVENous Q4H PRN    bacitracin 500 unit/gram packet 1 Packet  1 Packet Topical PRN    sodium chloride (NS) flush 5-40 mL  5-40 mL IntraVENous Q8H    sodium chloride (NS) flush 5-40 mL  5-40 mL IntraVENous PRN    enoxaparin (LOVENOX) injection 40 mg  40 mg SubCUTAneous Q24H    sodium chloride (NS) flush 20 mL  20 mL InterCATHeter PRN    sodium chloride (NS) flush 10 mL  10 mL InterCATHeter Q24H    sodium chloride (NS) flush 10 mL  10 mL InterCATHeter PRN    sodium chloride (NS) flush 10 mL  10 mL InterCATHeter Q8H    alteplase (CATHFLO) 1 mg in sterile water (preservative free) 1 mL injection  1 mg InterCATHeter PRN    calcium carbonate (TUMS) chewable tablet 400 mg [elemental]  400 mg Oral TID WITH MEALS    sodium chloride (NS) flush 5-40 mL  5-40 mL IntraVENous Q8H    sodium chloride (NS) flush 5-40 mL  5-40 mL IntraVENous PRN    acetaminophen (TYLENOL) tablet 500 mg  500 mg Oral Q6H PRN    ondansetron (ZOFRAN) injection 4 mg  4 mg IntraVENous Q4H PRN   ______________________________________________________________________  EXPECTED LENGTH OF STAY: 4d 4h  ACTUAL LENGTH OF STAY:          11               Gladys Hein MD

## 2019-03-16 LAB
ANION GAP SERPL CALC-SCNC: 4 MMOL/L (ref 5–15)
ANION GAP SERPL CALC-SCNC: 5 MMOL/L (ref 5–15)
BUN SERPL-MCNC: 23 MG/DL (ref 6–20)
BUN SERPL-MCNC: 24 MG/DL (ref 6–20)
BUN/CREAT SERPL: 22 (ref 12–20)
BUN/CREAT SERPL: 23 (ref 12–20)
CALCIUM SERPL-MCNC: 8.8 MG/DL (ref 8.5–10.1)
CALCIUM SERPL-MCNC: 8.9 MG/DL (ref 8.5–10.1)
CHLORIDE SERPL-SCNC: 107 MMOL/L (ref 97–108)
CHLORIDE SERPL-SCNC: 107 MMOL/L (ref 97–108)
CO2 SERPL-SCNC: 30 MMOL/L (ref 21–32)
CO2 SERPL-SCNC: 30 MMOL/L (ref 21–32)
CREAT SERPL-MCNC: 1.06 MG/DL (ref 0.55–1.02)
CREAT SERPL-MCNC: 1.06 MG/DL (ref 0.55–1.02)
GLUCOSE SERPL-MCNC: 105 MG/DL (ref 65–100)
GLUCOSE SERPL-MCNC: 108 MG/DL (ref 65–100)
MAGNESIUM SERPL-MCNC: 1.5 MG/DL (ref 1.6–2.4)
MAGNESIUM SERPL-MCNC: 1.6 MG/DL (ref 1.6–2.4)
PHOSPHATE SERPL-MCNC: 2.2 MG/DL (ref 2.6–4.7)
POTASSIUM SERPL-SCNC: 2.6 MMOL/L (ref 3.5–5.1)
POTASSIUM SERPL-SCNC: 2.7 MMOL/L (ref 3.5–5.1)
SODIUM SERPL-SCNC: 141 MMOL/L (ref 136–145)
SODIUM SERPL-SCNC: 142 MMOL/L (ref 136–145)

## 2019-03-16 PROCEDURE — 36415 COLL VENOUS BLD VENIPUNCTURE: CPT

## 2019-03-16 PROCEDURE — 74011250637 HC RX REV CODE- 250/637: Performed by: INTERNAL MEDICINE

## 2019-03-16 PROCEDURE — 65270000029 HC RM PRIVATE

## 2019-03-16 PROCEDURE — 74011250636 HC RX REV CODE- 250/636: Performed by: NURSE PRACTITIONER

## 2019-03-16 PROCEDURE — 83735 ASSAY OF MAGNESIUM: CPT

## 2019-03-16 PROCEDURE — 84100 ASSAY OF PHOSPHORUS: CPT

## 2019-03-16 PROCEDURE — 80048 BASIC METABOLIC PNL TOTAL CA: CPT

## 2019-03-16 PROCEDURE — 74011250636 HC RX REV CODE- 250/636: Performed by: HOSPITALIST

## 2019-03-16 PROCEDURE — 74011250636 HC RX REV CODE- 250/636: Performed by: FAMILY MEDICINE

## 2019-03-16 PROCEDURE — 74011250636 HC RX REV CODE- 250/636: Performed by: INTERNAL MEDICINE

## 2019-03-16 PROCEDURE — 77030038269 HC DRN EXT URIN PURWCK BARD -A

## 2019-03-16 RX ORDER — POTASSIUM CHLORIDE 1.5 G/1.77G
40 POWDER, FOR SOLUTION ORAL 2 TIMES DAILY WITH MEALS
Status: COMPLETED | OUTPATIENT
Start: 2019-03-16 | End: 2019-03-16

## 2019-03-16 RX ORDER — POTASSIUM CHLORIDE 750 MG/1
40 TABLET, FILM COATED, EXTENDED RELEASE ORAL 2 TIMES DAILY
Status: DISCONTINUED | OUTPATIENT
Start: 2019-03-16 | End: 2019-03-16

## 2019-03-16 RX ORDER — POTASSIUM CHLORIDE 14.9 MG/ML
10 INJECTION INTRAVENOUS
Status: COMPLETED | OUTPATIENT
Start: 2019-03-16 | End: 2019-03-16

## 2019-03-16 RX ORDER — POTASSIUM CHLORIDE 14.9 MG/ML
10 INJECTION INTRAVENOUS
Status: COMPLETED | OUTPATIENT
Start: 2019-03-17 | End: 2019-03-17

## 2019-03-16 RX ORDER — POTASSIUM CHLORIDE 1.5 G/1.77G
40 POWDER, FOR SOLUTION ORAL 2 TIMES DAILY WITH MEALS
Status: COMPLETED | OUTPATIENT
Start: 2019-03-17 | End: 2019-03-18

## 2019-03-16 RX ADMIN — HYDROMORPHONE HYDROCHLORIDE 1 MG: 2 INJECTION, SOLUTION INTRAMUSCULAR; INTRAVENOUS; SUBCUTANEOUS at 13:09

## 2019-03-16 RX ADMIN — Medication 10 ML: at 13:02

## 2019-03-16 RX ADMIN — Medication 10 ML: at 22:26

## 2019-03-16 RX ADMIN — Medication 10 ML: at 06:00

## 2019-03-16 RX ADMIN — ONDANSETRON HYDROCHLORIDE 4 MG: 2 INJECTION INTRAMUSCULAR; INTRAVENOUS at 18:05

## 2019-03-16 RX ADMIN — POTASSIUM CHLORIDE 40 MEQ: 1.5 POWDER, FOR SOLUTION ORAL at 18:06

## 2019-03-16 RX ADMIN — CALCIUM CARBONATE (ANTACID) CHEW TAB 500 MG 400 MG: 500 CHEW TAB at 08:05

## 2019-03-16 RX ADMIN — Medication 10 ML: at 08:01

## 2019-03-16 RX ADMIN — ONDANSETRON HYDROCHLORIDE 4 MG: 2 INJECTION INTRAMUSCULAR; INTRAVENOUS at 02:31

## 2019-03-16 RX ADMIN — HYDROMORPHONE HYDROCHLORIDE 1 MG: 2 INJECTION, SOLUTION INTRAMUSCULAR; INTRAVENOUS; SUBCUTANEOUS at 18:05

## 2019-03-16 RX ADMIN — ENOXAPARIN SODIUM 40 MG: 40 INJECTION SUBCUTANEOUS at 18:05

## 2019-03-16 RX ADMIN — POTASSIUM CHLORIDE 10 MEQ: 200 INJECTION, SOLUTION INTRAVENOUS at 11:05

## 2019-03-16 RX ADMIN — POTASSIUM CHLORIDE 10 MEQ: 200 INJECTION, SOLUTION INTRAVENOUS at 23:52

## 2019-03-16 RX ADMIN — ONDANSETRON HYDROCHLORIDE 4 MG: 2 INJECTION INTRAMUSCULAR; INTRAVENOUS at 08:00

## 2019-03-16 RX ADMIN — ONDANSETRON HYDROCHLORIDE 4 MG: 2 INJECTION INTRAMUSCULAR; INTRAVENOUS at 22:27

## 2019-03-16 RX ADMIN — POTASSIUM CHLORIDE 10 MEQ: 200 INJECTION, SOLUTION INTRAVENOUS at 10:00

## 2019-03-16 RX ADMIN — HYDROMORPHONE HYDROCHLORIDE 1 MG: 2 INJECTION, SOLUTION INTRAMUSCULAR; INTRAVENOUS; SUBCUTANEOUS at 22:27

## 2019-03-16 RX ADMIN — Medication 10 ML: at 13:01

## 2019-03-16 RX ADMIN — POTASSIUM CHLORIDE 10 MEQ: 200 INJECTION, SOLUTION INTRAVENOUS at 08:05

## 2019-03-16 RX ADMIN — HYDROMORPHONE HYDROCHLORIDE 1 MG: 2 INJECTION, SOLUTION INTRAMUSCULAR; INTRAVENOUS; SUBCUTANEOUS at 08:00

## 2019-03-16 RX ADMIN — HYDROMORPHONE HYDROCHLORIDE 1 MG: 2 INJECTION, SOLUTION INTRAMUSCULAR; INTRAVENOUS; SUBCUTANEOUS at 02:31

## 2019-03-16 RX ADMIN — ONDANSETRON HYDROCHLORIDE 4 MG: 2 INJECTION INTRAMUSCULAR; INTRAVENOUS at 13:10

## 2019-03-16 RX ADMIN — POTASSIUM CHLORIDE 10 MEQ: 200 INJECTION, SOLUTION INTRAVENOUS at 09:02

## 2019-03-16 NOTE — PROGRESS NOTES
Name: Shaggy Patrick MRN: [id number]   : 1990 Hospital: . [facility]   Date: 3/16/2019        IMPRESSION:   · Severe rhabdomyolysis - now resolving. · ITZ- stable, non oliguric, resolved  · Hemoglobinuria from Rhabdo. · Multiple electrolytes abnormalities  ·   Hypocalcemia- currently asymptomatic. ·   Hyperphosphatemia- on PO4 binders  · Hypervolemia- diuresed   · Hypokalemia- worse. Patient has a very poor oral intake. PLAN:   · Start protein shakes. · Muscle biopsy results are pending. · Replace K  · Will give a dose of lasix again after patient's K is above 3     Subjective/Interval History:   I have reviewed the flowsheet and previous days notes. ROS:Pertinent items are noted in HPI. Patient feels better. She had her pain meds. Objective:   Vital Signs:    Visit Vitals  /83 (BP 1 Location: Left arm, BP Patient Position: At rest)   Pulse 91   Temp 98.8 °F (37.1 °C)   Resp 16   Ht 5' 3\" (1.6 m)   Wt 130.4 kg (287 lb 7.7 oz)   SpO2 99%   BMI 50.92 kg/m²       O2 Device: Room air   O2 Flow Rate (L/min): 2 l/min   Temp (24hrs), Av.5 °F (36.9 °C), Min:97.8 °F (36.6 °C), Max:98.8 °F (37.1 °C)       Intake/Output:   Last shift:       0701 -  1900  In: -   Out: 1000 [Urine:1000]  Last 3 shifts:  190 -  0700  In: -   Out: 5400 [Urine:5400]    Intake/Output Summary (Last 24 hours) at 3/16/2019 1131  Last data filed at 3/16/2019 1125  Gross per 24 hour   Intake --   Output 6400 ml   Net -6400 ml        Physical Exam:  General:    Alert, cooperative, no distress, appears stated age. Sleepy. Head:   Normocephalic, without obvious abnormality, atraumatic. Eyes:   Conjunctivae/corneas clear. Lungs:   Clear to auscultation bilaterally. No Wheezing or Rhonchi. No rales. Chest wall:  No tenderness or deformity. No Accessory muscle use. Heart:   Regular rate and rhythm,  no murmur, rub or gallop. Abdomen:   Soft, non-tender. Not distended.   Bowel sounds normal. No masses. Holley cath is in draining brownish urine. Extremities: Extremities normal, atraumatic, No cyanosis. 2+ edema. No clubbing  Skin:     Texture, turgor normal. No rashes or lesions. Not Jaundiced  Psych:  Good insight. Not depressed. Not anxious or agitated. Flat affect. Neurologic: Normal strength, Alert and oriented X 3. DATA:  Labs:  Recent Labs     03/16/19  0542 03/15/19  0230 03/14/19  0435    141 141   K 2.6* 2.9* 3.0*    108 109*   CO2 30 28 24   BUN 24* 27* 32*   CREA 1.06* 0.94 1.07*   CA 8.8 9.0 9.0   ALB  --   --  1.9*   PHOS 2.2* 1.5* 1.6*   MG 1.6 1.8 1.6     No results for input(s): WBC, HGB, HCT, PLT, HGBEXT, HCTEXT, PLTEXT, HGBEXT, HCTEXT, PLTEXT in the last 72 hours. No results for input(s): VICKI, KU, CLU, CREAU in the last 72 hours.     No lab exists for component: PROU    Total time spent with patient:  35 minutes    [] Critical Care Provided    Care Plan discussed with:   Family, Medical Team    Maicol León MD

## 2019-03-16 NOTE — PROGRESS NOTES
Hospitalist Progress Note  Lita Estrella MD  Answering service: [phone] from in house phone  Cell: [phone]   Date of Service:  3/16/2019  NAME:  Shital Lozano  :  1990  MRN:  [id number]    Admission Summary:   Pt presented to the ED with generalized body aches and soreness with any movement. She has a hx of rhabdomyolysis x 2 other times in her life. Reported a mild cold but denied ay strenuous activity or heavy exertion. She hasn't started any new prescription meds, but started taking a weight loss pill called \"thermofight-x\" ~ 1 week PTA. She also reported she noticed her urine had turned a dark brown color. Due to her hx of rhabdo, she noted these symptoms and came to the ED with expectation of the diagnosis. Interval history / Subjective:   Again was able to get to the chair this am, but has not been able to walk. Not getting up to the commode consistently. Pt requesting to be discharged. No good follow up plan as patient does not have a PCP, no insurance. Hoping to have a better discharge plan on Monday, with the help of CM to possibly find a clinic to help. Also need to call pathology first thing in the morning, to see if the result can be expedited. Assessment & Plan:     Rhabdomyolysis: resolving, was severe on admission CK > million etiology is uncertain, but likely genetic, 3rd episode, and family members also have this. - significant hypokalemia on admit which could precipitate rhabdo. She also started taking a weight-loss pill called Thermofight-X which contains multiple ingredients including chromium and a \"proprietery blend. \"   - UDS negative and denies any sort of illicit drug   - monitoring electrolytes  - pain controlled - dilaudid PRN, space to every 4 hours.   - TSH 0.30; JAMARCUS negative  - muscle bx done 3/11, result pending  - lovenox restarted 3/11   - CK continues to trend down  - DC IVF    LE edema and anasarca - from overall fluid balance while admitted  - Lasix today when K is over 3  - Nephrology following    Tachycardia: improving  - onset 3/10 morning, -130's - still elevated today with no other symptoms  - no pain, no shortness of breath or hypoxia. No fevers. - EKG appeared Sinus Tach  - + leukocytosis but no fevers  - patient has previously refused CTA    Transaminitis: Monitor daily - improving    Hypokalemia: replete as needed,  monitor daily     Hypocalcemia: Resolved  - Calcium has corrected, pt is still declining tums    Hypophosphatemia: replacing, however patient intermittently refusing. ITZ: resolved, Watching creatinine closley with slightly elevated cr today   - nephrology following  - Continue to monitor    Loose Stool: Resolved    Code status: Full  DVT prophylaxis: Lovenox  Care Plan discussed with: patient/pts , nurse, attending MD  Disposition: home when able. PT/OT. Hospital Problems  Date Reviewed: 5/12/2016          Codes Class Noted POA    Hypokalemia ICD-10-CM: E87.6  ICD-9-CM: 276.8  3/4/2019 Yes        * (Principal) Rhabdomyolysis ICD-10-CM: Z03.05  ICD-9-CM: 728.88  5/4/2016 Yes            Review of Systems:   No headache. No chest pain, pressure or palpitations. No shortness of breath or hypoxia. Intermittent nausea and vomiting. Vital Signs:    Last 24hrs VS reviewed since prior progress note. Most recent are:  Visit Vitals  /86 (BP 1 Location: Left arm, BP Patient Position: Sitting; At rest)   Pulse 94   Temp 98.6 °F (37 °C)   Resp 18   Ht 5' 3\" (1.6 m)   Wt 130.4 kg (287 lb 7.7 oz)   SpO2 99%   BMI 50.92 kg/m²       Intake/Output Summary (Last 24 hours) at 3/16/2019 1638  Last data filed at 3/16/2019 1125  Gross per 24 hour   Intake --   Output 4400 ml   Net -4400 ml      Physical Examination:         Constitutional:  Cooperative, pleasant, up in chair    Resp:  CTA bilaterally. No wheezing. No accessory muscle use and on RA. CV:  Regular rhythm, tachycardic rate. No murmurs. GI:  Obese. Non distended, non tender. Normoactive bowel sounds. Musculoskeletal:  Generalized edema to all extremities     Neurologic:  Moves all extremities slowly. AAOx3. Sensation is intact. No focal deficits       PICC: Placed 3/6 (right)      Data Review:   Laboratory and imaging data reviewed. Labs:     No results for input(s): WBC, HGB, HCT, PLT, HGBEXT, HCTEXT, PLTEXT, HGBEXT, HCTEXT, PLTEXT in the last 72 hours. Recent Labs     03/16/19  0542 03/15/19  0230 03/14/19  0435    141 141   K 2.6* 2.9* 3.0*    108 109*   CO2 30 28 24   BUN 24* 27* 32*   CREA 1.06* 0.94 1.07*   * 109* 125*   CA 8.8 9.0 9.0   MG 1.6 1.8 1.6   PHOS 2.2* 1.5* 1.6*     Recent Labs     03/14/19 0435   SGOT 459*   *   AP 39*   TBILI 0.4   TP 4.8*   ALB 1.9*   GLOB 2.9     No results for input(s): INR, PTP, APTT in the last 72 hours. No lab exists for component: INREXT, INREXT   No results for input(s): FE, TIBC, PSAT, FERR in the last 72 hours. No results found for: FOL, RBCF   No results for input(s): PH, PCO2, PO2 in the last 72 hours.   Recent Labs     03/15/19  0230   CPK 18,090*     No results found for: CHOL, CHOLX, CHLST, CHOLV, HDL, LDL, LDLC, DLDLP, TGLX, TRIGL, TRIGP, CHHD, CHHDX  Lab Results   Component Value Date/Time    Glucose (POC) 92 04/28/2013 11:56 AM     Lab Results   Component Value Date/Time    Color RED 03/07/2019 05:09 AM    Appearance TURBID (A) 03/07/2019 05:09 AM    Specific gravity 1.022 03/07/2019 05:09 AM    Specific gravity 1.025 03/04/2019 12:14 AM    pH (UA) 6.0 03/07/2019 05:09 AM    Protein NEGATIVE  03/07/2019 05:09 AM    Glucose NEGATIVE  03/07/2019 05:09 AM    Ketone NEGATIVE  03/07/2019 05:09 AM    Bilirubin NEGATIVE  05/26/2016 07:42 PM    Urobilinogen 1.0 03/07/2019 05:09 AM    Nitrites POSITIVE (A) 03/07/2019 05:09 AM    Leukocyte Esterase NEGATIVE  03/07/2019 05:09 AM    Epithelial cells FEW 03/07/2019 05:09 AM    Bacteria NEGATIVE  03/07/2019 05:09 AM    WBC 0-4 03/07/2019 05:09 AM    RBC 5-10 03/07/2019 05:09 AM     Medications Reviewed:     Current Facility-Administered Medications   Medication Dose Route Frequency    [START ON 3/17/2019] potassium chloride (KLOR-CON) packet for solution 40 mEq  40 mEq Oral BID WITH MEALS    potassium chloride (KLOR-CON) packet for solution 40 mEq  40 mEq Oral BID WITH MEALS    oxyCODONE (ROXICODONE) 5 mg/5 mL oral solution 5 mg  5 mg Oral Q4H PRN    HYDROmorphone (PF) (DILAUDID) injection 1 mg  1 mg IntraVENous Q4H PRN    bacitracin 500 unit/gram packet 1 Packet  1 Packet Topical PRN    sodium chloride (NS) flush 5-40 mL  5-40 mL IntraVENous Q8H    sodium chloride (NS) flush 5-40 mL  5-40 mL IntraVENous PRN    enoxaparin (LOVENOX) injection 40 mg  40 mg SubCUTAneous Q24H    sodium chloride (NS) flush 20 mL  20 mL InterCATHeter PRN    sodium chloride (NS) flush 10 mL  10 mL InterCATHeter Q24H    sodium chloride (NS) flush 10 mL  10 mL InterCATHeter PRN    sodium chloride (NS) flush 10 mL  10 mL InterCATHeter Q8H    alteplase (CATHFLO) 1 mg in sterile water (preservative free) 1 mL injection  1 mg InterCATHeter PRN    calcium carbonate (TUMS) chewable tablet 400 mg [elemental]  400 mg Oral TID WITH MEALS    sodium chloride (NS) flush 5-40 mL  5-40 mL IntraVENous Q8H    sodium chloride (NS) flush 5-40 mL  5-40 mL IntraVENous PRN    acetaminophen (TYLENOL) tablet 500 mg  500 mg Oral Q6H PRN    ondansetron (ZOFRAN) injection 4 mg  4 mg IntraVENous Q4H PRN   ______________________________________________________________________  EXPECTED LENGTH OF STAY: 4d 4h  ACTUAL LENGTH OF STAY:          12               Dat Guerra MD

## 2019-03-16 NOTE — PROGRESS NOTES
Bedside shift change report given to Virginie (oncoming nurse) by Ynes Osei (offgoing nurse). Report included the following information SBAR, Kardex, ED Summary, Procedure Summary, Intake/Output, MAR and Recent Results.

## 2019-03-16 NOTE — PROGRESS NOTES
[phone] - RN contacted by pt mother, Senia Tamez. Pt granted permission to discuss care with mother. Mother requesting care plan and to talk with Dr. Jerman Rojas. RN contacted Dr. Jerman Rojas regarding the matter and per Dr. Jerman Rojas, she will contact the mother regarding pt care.

## 2019-03-17 ENCOUNTER — APPOINTMENT (OUTPATIENT)
Dept: ULTRASOUND IMAGING | Age: 29
DRG: 500 | End: 2019-03-17
Attending: INTERNAL MEDICINE
Payer: SELF-PAY

## 2019-03-17 LAB
ANION GAP SERPL CALC-SCNC: 4 MMOL/L (ref 5–15)
ANION GAP SERPL CALC-SCNC: 6 MMOL/L (ref 5–15)
BUN SERPL-MCNC: 21 MG/DL (ref 6–20)
BUN SERPL-MCNC: 22 MG/DL (ref 6–20)
BUN/CREAT SERPL: 19 (ref 12–20)
BUN/CREAT SERPL: 21 (ref 12–20)
CALCIUM SERPL-MCNC: 8.7 MG/DL (ref 8.5–10.1)
CALCIUM SERPL-MCNC: 8.9 MG/DL (ref 8.5–10.1)
CHLORIDE SERPL-SCNC: 107 MMOL/L (ref 97–108)
CHLORIDE SERPL-SCNC: 107 MMOL/L (ref 97–108)
CO2 SERPL-SCNC: 29 MMOL/L (ref 21–32)
CO2 SERPL-SCNC: 31 MMOL/L (ref 21–32)
CORTIS AM PEAK SERPL-MCNC: 7.4 UG/DL (ref 4.3–22.45)
CREAT SERPL-MCNC: 1.03 MG/DL (ref 0.55–1.02)
CREAT SERPL-MCNC: 1.12 MG/DL (ref 0.55–1.02)
GLUCOSE SERPL-MCNC: 131 MG/DL (ref 65–100)
GLUCOSE SERPL-MCNC: 96 MG/DL (ref 65–100)
PHOSPHATE SERPL-MCNC: 2.5 MG/DL (ref 2.6–4.7)
POTASSIUM SERPL-SCNC: 2.9 MMOL/L (ref 3.5–5.1)
POTASSIUM SERPL-SCNC: 3.2 MMOL/L (ref 3.5–5.1)
SODIUM SERPL-SCNC: 142 MMOL/L (ref 136–145)
SODIUM SERPL-SCNC: 142 MMOL/L (ref 136–145)

## 2019-03-17 PROCEDURE — 74011000250 HC RX REV CODE- 250: Performed by: INTERNAL MEDICINE

## 2019-03-17 PROCEDURE — 74011250636 HC RX REV CODE- 250/636: Performed by: INTERNAL MEDICINE

## 2019-03-17 PROCEDURE — 80048 BASIC METABOLIC PNL TOTAL CA: CPT

## 2019-03-17 PROCEDURE — 36415 COLL VENOUS BLD VENIPUNCTURE: CPT

## 2019-03-17 PROCEDURE — 74011250636 HC RX REV CODE- 250/636: Performed by: HOSPITALIST

## 2019-03-17 PROCEDURE — 74011250637 HC RX REV CODE- 250/637: Performed by: INTERNAL MEDICINE

## 2019-03-17 PROCEDURE — 74011250636 HC RX REV CODE- 250/636: Performed by: NURSE PRACTITIONER

## 2019-03-17 PROCEDURE — 82533 TOTAL CORTISOL: CPT

## 2019-03-17 PROCEDURE — 97535 SELF CARE MNGMENT TRAINING: CPT

## 2019-03-17 PROCEDURE — C9113 INJ PANTOPRAZOLE SODIUM, VIA: HCPCS | Performed by: INTERNAL MEDICINE

## 2019-03-17 PROCEDURE — 65270000029 HC RM PRIVATE

## 2019-03-17 PROCEDURE — 84100 ASSAY OF PHOSPHORUS: CPT

## 2019-03-17 RX ORDER — POTASSIUM CHLORIDE 14.9 MG/ML
10 INJECTION INTRAVENOUS
Status: COMPLETED | OUTPATIENT
Start: 2019-03-17 | End: 2019-03-17

## 2019-03-17 RX ORDER — ONDANSETRON 2 MG/ML
4 INJECTION INTRAMUSCULAR; INTRAVENOUS EVERY 6 HOURS
Status: DISCONTINUED | OUTPATIENT
Start: 2019-03-17 | End: 2019-03-18 | Stop reason: HOSPADM

## 2019-03-17 RX ORDER — SUCRALFATE 1 G/1
1 TABLET ORAL
Status: DISCONTINUED | OUTPATIENT
Start: 2019-03-17 | End: 2019-03-18 | Stop reason: HOSPADM

## 2019-03-17 RX ADMIN — POTASSIUM CHLORIDE 10 MEQ: 200 INJECTION, SOLUTION INTRAVENOUS at 20:33

## 2019-03-17 RX ADMIN — SODIUM CHLORIDE 40 MG: 9 INJECTION, SOLUTION INTRAMUSCULAR; INTRAVENOUS; SUBCUTANEOUS at 22:22

## 2019-03-17 RX ADMIN — HYDROMORPHONE HYDROCHLORIDE 1 MG: 2 INJECTION, SOLUTION INTRAMUSCULAR; INTRAVENOUS; SUBCUTANEOUS at 03:01

## 2019-03-17 RX ADMIN — POTASSIUM CHLORIDE 10 MEQ: 200 INJECTION, SOLUTION INTRAVENOUS at 12:08

## 2019-03-17 RX ADMIN — POTASSIUM CHLORIDE 10 MEQ: 200 INJECTION, SOLUTION INTRAVENOUS at 21:24

## 2019-03-17 RX ADMIN — Medication 10 ML: at 15:30

## 2019-03-17 RX ADMIN — Medication 10 ML: at 22:23

## 2019-03-17 RX ADMIN — ONDANSETRON HYDROCHLORIDE 4 MG: 2 INJECTION INTRAMUSCULAR; INTRAVENOUS at 07:04

## 2019-03-17 RX ADMIN — ONDANSETRON HYDROCHLORIDE 4 MG: 2 INJECTION INTRAMUSCULAR; INTRAVENOUS at 11:19

## 2019-03-17 RX ADMIN — POTASSIUM CHLORIDE 10 MEQ: 200 INJECTION, SOLUTION INTRAVENOUS at 01:00

## 2019-03-17 RX ADMIN — ENOXAPARIN SODIUM 40 MG: 40 INJECTION SUBCUTANEOUS at 16:34

## 2019-03-17 RX ADMIN — HYDROMORPHONE HYDROCHLORIDE 1 MG: 2 INJECTION, SOLUTION INTRAMUSCULAR; INTRAVENOUS; SUBCUTANEOUS at 07:03

## 2019-03-17 RX ADMIN — SODIUM CHLORIDE 40 MG: 9 INJECTION, SOLUTION INTRAMUSCULAR; INTRAVENOUS; SUBCUTANEOUS at 15:30

## 2019-03-17 RX ADMIN — POTASSIUM CHLORIDE 10 MEQ: 200 INJECTION, SOLUTION INTRAVENOUS at 03:01

## 2019-03-17 RX ADMIN — POTASSIUM CHLORIDE 10 MEQ: 200 INJECTION, SOLUTION INTRAVENOUS at 13:24

## 2019-03-17 RX ADMIN — POTASSIUM CHLORIDE 10 MEQ: 200 INJECTION, SOLUTION INTRAVENOUS at 10:55

## 2019-03-17 RX ADMIN — Medication 10 ML: at 14:00

## 2019-03-17 RX ADMIN — POTASSIUM CHLORIDE 10 MEQ: 200 INJECTION, SOLUTION INTRAVENOUS at 02:10

## 2019-03-17 RX ADMIN — Medication 5 MG: at 11:18

## 2019-03-17 RX ADMIN — Medication 10 ML: at 07:03

## 2019-03-17 RX ADMIN — ONDANSETRON HYDROCHLORIDE 4 MG: 2 INJECTION INTRAMUSCULAR; INTRAVENOUS at 03:01

## 2019-03-17 RX ADMIN — POTASSIUM CHLORIDE 10 MEQ: 200 INJECTION, SOLUTION INTRAVENOUS at 09:16

## 2019-03-17 RX ADMIN — POTASSIUM CHLORIDE 10 MEQ: 200 INJECTION, SOLUTION INTRAVENOUS at 19:10

## 2019-03-17 RX ADMIN — POTASSIUM CHLORIDE 40 MEQ: 1.5 POWDER, FOR SOLUTION ORAL at 16:35

## 2019-03-17 RX ADMIN — Medication 10 ML: at 15:31

## 2019-03-17 RX ADMIN — POTASSIUM CHLORIDE 40 MEQ: 1.5 POWDER, FOR SOLUTION ORAL at 09:25

## 2019-03-17 RX ADMIN — POTASSIUM CHLORIDE 10 MEQ: 200 INJECTION, SOLUTION INTRAVENOUS at 22:26

## 2019-03-17 NOTE — CONSULTS
[facility].  217 [facility] 140 [place], [address]  [phone]                     GI CONSULTATION NOTE      NAME:  Owen Mccarthy   :   1990   MRN:   [id number]     Consult Date: 3/17/2019     Chief Complaint: nausea and vomiting    History of Present Illness:  Patient is a 34 y.o. who is seen re: nausea and vomiting. This is her third admission with rhabdomyolysis. She reports taking a diet pill prior to this episode. She has been in the hospital since 3/3. She is slowly improving with supportive care and IVF. A muscle biopsy has been performed and path is pending. She states she always has nausea/vomiting during hospitalizations for rhabdo, and these symptoms improve as rhabdo improves. She denies any prior issues with nausea, vomiting, abdominal pain, GERD, dysphagia. Today she was eating food like a banana and liquids and then vomited once shortly after eating. This is happening a few things per day. No abdominal pain. No prior abdominal surgeries. No prior egd. No GI bleeding. No prior pancreatitis or liver/GB issues. She had a regular BM today. PMH:  Past Medical History:   Diagnosis Date    Rhabdomyolysis     Traumatic rhabdomyolysis ([facility] [address] 2016       PSH:  History reviewed. No pertinent surgical history. Allergies:   Allergies   Allergen Reactions    Compazine [Prochlorperazine Edisylate] Anaphylaxis     stroke    Midol [Acetaminophen-Pamabrom] Anaphylaxis     dont take again because of rabdo    Aspirin Other (comments)     Never take again because of rabdo    Compazine [Prochlorperazine Edisylate] Other (comments)     Stroke symptoms    Contrast Agent [Iodine] Other (comments)     Kidney problems    Ibuprofen Other (comments)     Kidney problem,rhabdo      Motrin [Ibuprofen] Nausea and Vomiting    Zofran [Ondansetron Hcl (Pf)] Nausea and Vomiting       Home Medications:  Prior to Admission Medications   Prescriptions Last Dose Informant Patient Reported? Taking?    OTHER   Yes Yes   Sig: Thermofight X, weight loss supplement      Facility-Administered Medications: None       Hospital Medications:  Current Facility-Administered Medications   Medication Dose Route Frequency    potassium chloride 10 mEq in 50 ml IVPB  10 mEq IntraVENous Q1H    sucralfate (CARAFATE) tablet 1 g  1 g Oral AC&HS    pantoprazole (PROTONIX) 40 mg in sodium chloride 0.9% 10 mL injection  40 mg IntraVENous Q12H    potassium chloride (KLOR-CON) packet for solution 40 mEq  40 mEq Oral BID WITH MEALS    oxyCODONE (ROXICODONE) 5 mg/5 mL oral solution 5 mg  5 mg Oral Q4H PRN    HYDROmorphone (PF) (DILAUDID) injection 1 mg  1 mg IntraVENous Q4H PRN    bacitracin 500 unit/gram packet 1 Packet  1 Packet Topical PRN    sodium chloride (NS) flush 5-40 mL  5-40 mL IntraVENous Q8H    sodium chloride (NS) flush 5-40 mL  5-40 mL IntraVENous PRN    enoxaparin (LOVENOX) injection 40 mg  40 mg SubCUTAneous Q24H    sodium chloride (NS) flush 20 mL  20 mL InterCATHeter PRN    sodium chloride (NS) flush 10 mL  10 mL InterCATHeter Q24H    sodium chloride (NS) flush 10 mL  10 mL InterCATHeter PRN    sodium chloride (NS) flush 10 mL  10 mL InterCATHeter Q8H    alteplase (CATHFLO) 1 mg in sterile water (preservative free) 1 mL injection  1 mg InterCATHeter PRN    calcium carbonate (TUMS) chewable tablet 400 mg [elemental]  400 mg Oral TID WITH MEALS    sodium chloride (NS) flush 5-40 mL  5-40 mL IntraVENous Q8H    sodium chloride (NS) flush 5-40 mL  5-40 mL IntraVENous PRN    acetaminophen (TYLENOL) tablet 500 mg  500 mg Oral Q6H PRN    ondansetron (ZOFRAN) injection 4 mg  4 mg IntraVENous Q4H PRN       Social History:  Social History     Tobacco Use    Smoking status: Current Some Day Smoker    Smokeless tobacco: Never Used   Substance Use Topics    Alcohol use: No     Comment: rare       Family History:  Family History   Problem Relation Age of Onset    Diabetes Father     Diabetes Maternal Grandmother     Diabetes Maternal Grandfather        Review of Systems:    Constitutional: + fatigue  Eyes:   Negative for visual changes  ENT:   Negative for sore throat, tongue or lip swelling  Respiratory:  Negative for cough, negative dyspnea  Cards:  negative for chest pain, palpitations, lower extremity edema  GI:   See HPI  :  negative for frequency, dysuria  Integument:  negative for rash and pruritus  Heme:  negative for easy bruising and gum/nose bleeding  Musculoskel: + myalgias  Neuro: negative for headaches, dizziness, vertigo  Psych:  negative for feelings of anxiety, depression      Objective:     Patient Vitals for the past 8 hrs:   BP Temp Pulse Resp SpO2 Height Weight   03/17/19 0836 117/76 98.5 °F (36.9 °C) 97 16 95 % -- --   03/17/19 0639 -- -- -- -- -- 5' 3\" (1.6 m) 128.2 kg (282 lb 10.1 oz)     No intake/output data recorded. PHYSICAL EXAM:  Gen: NAD, alert and oriented x 4 obese black F  HEENT: PEERLA, EOMI  Neck: supple  Chest: CTA bilaterally  CV: RRR, no m/g/c/r  Abd: soft, NT, ND, +BS    Data Review   No results for input(s): WBC, HGB, HCT, PLT, HGBEXT, HCTEXT, PLTEXT in the last 72 hours. Recent Labs     03/17/19  0653 03/16/19 2015 03/16/19  0542    141 142   K 2.9* 2.7* 2.6*    107 107   CO2 29 30 30   BUN 21* 23* 24*   CREA 1.12* 1.06* 1.06*   * 105* 108*   PHOS 2.5*  --  2.2*   CA 8.9 8.9 8.8     No results for input(s): SGOT, GPT, AP, TBIL, TP, ALB, GLOB, GGT, AML, LPSE in the last 72 hours. No lab exists for component: AMYP, HLPSE  No results for input(s): INR, PTP, APTT in the last 72 hours. No lab exists for component: INREXT         Assessment:     33 yo F currently hospitalized with prolonged recovery from rhabdomyolysis. This is her 3rd episode, and there is a question of genetic etiology. We are asked to see her re: nausea and vomiting. Nausea and vomiting are likely related to underlying rhabdo.  She is slowly improved, as is the nausea. I discussed with her the role of endoscopy to rule out other process, and she declines acutely. Plan:     - Will check AM cortisol, lipase (TSH 0.3). - Agressive IV K and lyte repletion  - Abdominal US pending  - Will schedule anti-emetics. She will focus more on ensure, etc which she is usually tolerating  - We will continue to follow.  If no improvement in coming days, consider endoscopy

## 2019-03-17 NOTE — PROGRESS NOTES
Hospitalist Progress Note  Ventura Medellin MD  Answering service: [phone] from in house phone  Cell: [phone]   Date of Service:  3/17/2019  NAME:  Kishore Srinivasan  :  1990  MRN:  [id number]    Admission Summary:   Pt presented to the ED with generalized body aches and soreness with any movement. She has a hx of rhabdomyolysis x 2 other times in her life. Reported a mild cold but denied ay strenuous activity or heavy exertion. She hasn't started any new prescription meds, but started taking a weight loss pill called \"thermofight-x\" ~ 1 week PTA. She also reported she noticed her urine had turned a dark brown color. Due to her hx of rhabdo, she noted these symptoms and came to the ED with expectation of the diagnosis. Interval history / Subjective:   Having ongoing nausea and vomiting, limited her PO intake as well as oral medications  Potassium remains low, possibly as a result of her vomiting. Feels like there is a sensation of her liquid getting \"stuck\" when trying to swallow. Upset about prospect of having to stay in hospital. Discussed her potassium levels being low, and being unable to tolerate oral repletions. Praised on her efforts on getting to the chair, would also like to see her up and walking more. Assessment & Plan:     Rhabdomyolysis: resolving, was severe on admission CK > million etiology is uncertain, but likely genetic, 3rd episode, and family members also have this. - significant hypokalemia on admit which could precipitate rhabdo. She also started taking a weight-loss pill called Thermofight-X which contains multiple ingredients including chromium and a \"proprietery blend. \"   - UDS negative and denies any sort of illicit drug   - monitoring electrolytes  - pain controlled - dilaudid PRN, space to every 4 hours.   - TSH 0.30; JAMARCUS negative  - muscle bx done 3/11, result pending, need to call path lab ASAP tomorrow am to see if we can expedite results. - lovenox restarted 3/11   - CK continues to trend down  - Now off IVF    N/V - ongoing, even after resolution of rhabdo, patinet report having \"something stuck\" when trying to swallow. - GI consult ? EGD   - Continue anti-emetics  - Would need to be able to tolerate liquids and PO electrolyte repletions. LE edema and anasarca - from overall fluid balance while admitted  - Lasix per nephro  - Nephrology following    Tachycardia: improving  - onset 3/10 morning, -130's - still elevated today with no other symptoms  - no pain, no shortness of breath or hypoxia. No fevers. - EKG appeared Sinus Tach  - + leukocytosis but no fevers  - patient has previously refused CTA    Transaminitis: Monitor daily - improving    Hypokalemia: replete as needed,  Pt only able to tolerate IV repletions due to N/V. monitor daily     Hypocalcemia: Resolved  - Calcium has corrected, pt is still declining tums    Hypophosphatemia: replacing, however patient intermittently refusing oral repletions. ITZ: resolved, Watching creatinine closley with slightly elevated cr today   - nephrology following  - Continue to monitor  - I and O     Loose Stool: Resolved    Code status: Full  DVT prophylaxis: Lovenox  Care Plan discussed with: patient/pts , nurse, attending MD  Disposition: home when able to tolerate PO, PO electrolyte repletions, has follow up with PCP for lab work, PT/OT. Hospital Problems  Date Reviewed: 5/12/2016          Codes Class Noted POA    Hypokalemia ICD-10-CM: E87.6  ICD-9-CM: 276.8  3/4/2019 Yes        * (Principal) Rhabdomyolysis ICD-10-CM: N27.53  ICD-9-CM: 728.88  5/4/2016 Yes            Review of Systems:   No headache. No chest pain, pressure or palpitations. No shortness of breath or hypoxia. Intermittent nausea and vomiting. Vital Signs:    Last 24hrs VS reviewed since prior progress note.  Most recent are:  Visit Vitals  /85 Pulse 97   Temp 98.7 °F (37.1 °C)   Resp 16   Ht 5' 3\" (1.6 m)   Wt 128.2 kg (282 lb 10.1 oz)   SpO2 94%   BMI 50.07 kg/m²       Intake/Output Summary (Last 24 hours) at 3/17/2019 1534  Last data filed at 3/17/2019 1130  Gross per 24 hour   Intake --   Output 3200 ml   Net -3200 ml      Physical Examination:         Constitutional:  Cooperative, pleasant, up in chair    Resp:  CTA bilaterally. No wheezing. No accessory muscle use and on RA. CV:  Regular rhythm, tachycardic rate. No murmurs. GI:  Obese. Non distended, non tender. Normoactive bowel sounds. Musculoskeletal:  Generalized edema to all extremities     Neurologic:  Moves all extremities slowly. AAOx3. Sensation is intact. No focal deficits       PICC: Placed 3/6 (right)      Data Review:   Laboratory and imaging data reviewed. Labs:     No results for input(s): WBC, HGB, HCT, PLT, HGBEXT, HCTEXT, PLTEXT, HGBEXT, HCTEXT, PLTEXT in the last 72 hours. Recent Labs     03/17/19  0653 03/16/19 2015 03/16/19  0542 03/15/19  0230    141 142 141   K 2.9* 2.7* 2.6* 2.9*    107 107 108   CO2 29 30 30 28   BUN 21* 23* 24* 27*   CREA 1.12* 1.06* 1.06* 0.94   * 105* 108* 109*   CA 8.9 8.9 8.8 9.0   MG  --  1.5* 1.6 1.8   PHOS 2.5*  --  2.2* 1.5*     No results for input(s): SGOT, GPT, ALT, AP, TBIL, TBILI, TP, ALB, GLOB, GGT, AML, LPSE in the last 72 hours. No lab exists for component: AMYP, HLPSE  No results for input(s): INR, PTP, APTT in the last 72 hours. No lab exists for component: INREXT, INREXT   No results for input(s): FE, TIBC, PSAT, FERR in the last 72 hours. No results found for: FOL, RBCF   No results for input(s): PH, PCO2, PO2 in the last 72 hours.   Recent Labs     03/15/19  0230   CPK 18,090*     No results found for: CHOL, CHOLX, CHLST, CHOLV, HDL, LDL, LDLC, DLDLP, TGLX, TRIGL, TRIGP, CHHD, CHHDX  Lab Results   Component Value Date/Time    Glucose (POC) 92 04/28/2013 11:56 AM     Lab Results   Component Value Date/Time    Color RED 03/07/2019 05:09 AM    Appearance TURBID (A) 03/07/2019 05:09 AM    Specific gravity 1.022 03/07/2019 05:09 AM    Specific gravity 1.025 03/04/2019 12:14 AM    pH (UA) 6.0 03/07/2019 05:09 AM    Protein NEGATIVE  03/07/2019 05:09 AM    Glucose NEGATIVE  03/07/2019 05:09 AM    Ketone NEGATIVE  03/07/2019 05:09 AM    Bilirubin NEGATIVE  05/26/2016 07:42 PM    Urobilinogen 1.0 03/07/2019 05:09 AM    Nitrites POSITIVE (A) 03/07/2019 05:09 AM    Leukocyte Esterase NEGATIVE  03/07/2019 05:09 AM    Epithelial cells FEW 03/07/2019 05:09 AM    Bacteria NEGATIVE  03/07/2019 05:09 AM    WBC 0-4 03/07/2019 05:09 AM    RBC 5-10 03/07/2019 05:09 AM     Medications Reviewed:     Current Facility-Administered Medications   Medication Dose Route Frequency    sucralfate (CARAFATE) tablet 1 g  1 g Oral AC&HS    pantoprazole (PROTONIX) 40 mg in sodium chloride 0.9% 10 mL injection  40 mg IntraVENous Q12H    ondansetron (ZOFRAN) injection 4 mg  4 mg IntraVENous Q6H    potassium chloride (KLOR-CON) packet for solution 40 mEq  40 mEq Oral BID WITH MEALS    oxyCODONE (ROXICODONE) 5 mg/5 mL oral solution 5 mg  5 mg Oral Q4H PRN    HYDROmorphone (PF) (DILAUDID) injection 1 mg  1 mg IntraVENous Q4H PRN    bacitracin 500 unit/gram packet 1 Packet  1 Packet Topical PRN    sodium chloride (NS) flush 5-40 mL  5-40 mL IntraVENous Q8H    sodium chloride (NS) flush 5-40 mL  5-40 mL IntraVENous PRN    enoxaparin (LOVENOX) injection 40 mg  40 mg SubCUTAneous Q24H    sodium chloride (NS) flush 20 mL  20 mL InterCATHeter PRN    sodium chloride (NS) flush 10 mL  10 mL InterCATHeter Q24H    sodium chloride (NS) flush 10 mL  10 mL InterCATHeter PRN    sodium chloride (NS) flush 10 mL  10 mL InterCATHeter Q8H    alteplase (CATHFLO) 1 mg in sterile water (preservative free) 1 mL injection  1 mg InterCATHeter PRN    calcium carbonate (TUMS) chewable tablet 400 mg [elemental]  400 mg Oral TID WITH MEALS    sodium chloride (NS) flush 5-40 mL  5-40 mL IntraVENous Q8H    sodium chloride (NS) flush 5-40 mL  5-40 mL IntraVENous PRN    acetaminophen (TYLENOL) tablet 500 mg  500 mg Oral Q6H PRN    ondansetron (ZOFRAN) injection 4 mg  4 mg IntraVENous Q4H PRN   ______________________________________________________________________  EXPECTED LENGTH OF STAY: 4d 4h  ACTUAL LENGTH OF STAY:          13               Federico Christine MD

## 2019-03-17 NOTE — PROGRESS NOTES
Bedside, Verbal and Written shift change report given to Virginie (oncoming nurse) by Nesha Cantu (offgoing nurse). Report included the following information SBAR, Kardex, ED Summary, Procedure Summary, Intake/Output, MAR, Accordion and Recent Results. [id number] - RN paged Dr. Franklin Randall and informed of pt potassium 2.9.

## 2019-03-17 NOTE — PROGRESS NOTES
Problem: Self Care Deficits Care Plan (Adult)  Goal: *Acute Goals and Plan of Care (Insert Text)  Occupational Therapy Goals  Initiated 3/13/2019  1. Patient will perform self-feeding with independence within 7 day(s). 2.  Patient will perform grooming with independence within 7 day(s). 3.  Patient will perform upper body dressing with independence within 7 day(s). 4.  Patient will perform toilet transfers with moderate assistance for stand pivot transfer within 7 day(s). 5.  Patient will perform all aspects of toileting with moderate assistance  within 7 day(s). 6.  Patient will participate in upper extremity therapeutic exercise/activities with supervision/set-up for 10 minutes within 7 day(s). 7.  Patient will stand for functional task unsupported > or = 5 minutes within 7 day(s). Occupational Therapy TREATMENT  Patient: Skinny Rider (67 y.o. female)  Date: 3/17/2019  Diagnosis: Rhabdomyolysis [M62.82] Rhabdomyolysis  Procedure(s) (LRB):  LEFT THIGH MUSCLE BIOPSY (Left) 6 Days Post-Op  Precautions: Fall  Chart, occupational therapy assessment, plan of care, and goals were reviewed. ASSESSMENT:   The patient presents with Setup upper body ADLs, Maximum assistance and Total assistance lower body ADLs, and Minimum assistance and Assist x1 assist functional mobility to move from supine to sit with HOB elevated and bedrail and min assist of 1 to transfer bed to chair with RW. The following are barriers to independence with ADLs while in acute care:   - Cognitive and/or behavioral: none  - Medical condition: ROM, strength, functional endurance, standing balance, girth, medical history and edema bilateral UEs and LEs    - Other: Patient with flat affect. Little verbal interaction. Progressing well with mobility and self care, but far below baseline.      Prior level of function: Independent, working, mobility without assistive, driving,         PLAN:  Patient continues to benefit from skilled intervention to address the above impairments. Continue treatment per established plan of care. Recommendations and/or planned interventions for staff:  Standing tolerance, LE self care, transfers, toileting, functional endurance; Recommend patient OOB 3 times a day and progressing to use of BSC and toilet. Discharge recommendations: Rehab at inpatient facility: patient can tolerate 3 hours of therapy  If above is not an option then recommend: Home health (to increase independence and safety) and 24/7 assist    Barriers to discharging home, in addition to above listed impairments: entry and exit into the home, patient will require physical assist from medical transport/ambulance  level of physical assist required to maintain patient safety. Equipment recommendations for successful discharge (if) home: If discharged home, may need a RW and bariatric BSC. Possibly a W/C     SUBJECTIVE:   Patient stated I'm tired.     OBJECTIVE DATA SUMMARY:   Cognitive/Behavioral Status:  Neurologic State: Alert  Orientation Level: Oriented X4  Cognition: Follows commands; Appropriate for age attention/concentration  Perception: Appears intact  Perseveration: No perseveration noted  Safety/Judgement: Awareness of environment    Functional Mobility and Transfers for ADLs:  Bed Mobility:  Supine to Sit: Minimum assistance;Assist x1(with HOB elevated and use of bedrail)    Transfers:  Sit to Stand: Minimum assistance;Assist x1  Functional Transfers  Toilet Transfer : Minimum assistance;Assist x1(inferred from transfer to chair)  Adaptive Equipment: Bedside commode;Walker (comment)  Bed to Chair: Minimum assistance;Assist x1    Balance:  Sitting: Intact  Standing: Impaired; With support  Standing - Static: Fair  Standing - Dynamic : Poor    ADL Intervention:       Grooming  Grooming Assistance: Set-up(seated)  Washing Hands: Supervision/set-up  Brushing Teeth: Supervision/set-up    Upper Body Bathing  Bathing Assistance: Set-up  Position Performed: Seated edge of bed    Lower Body Bathing  Bathing Assistance: Maximum assistance  Perineal  : Maximum assistance  Position Performed: Standing  Adaptive Equipment: Walker  Lower Body : Moderate assistance  Position Performed: Seated edge of bed    Upper Body [address]: Minimum  assistance(IV)    Lower Body Dressing Assistance  Pants With Elastic Waist: Total assistance(dependent)(inferred from mobility)  Socks: Total assistance (dependent)  Leg Crossed Method Used: No  Position Performed: Bending forward method;Seated edge of bed;Standing  Adaptive Equipment Used: Junious Pound  Bladder Hygiene: Total assistance (dependent)(Pure wick)  Bowel Hygiene: Total assistance (dependent)(inferred from bathing)  Clothing Management: Maximum assistance  Adaptive Equipment: Walker    Cognitive Retraining  Safety/Judgement: Awareness of environment      Activity Tolerance:   Fair  Please refer to the flowsheet for vital signs taken during this treatment.     After treatment patient left:   Up in chair  Bed/Chair-wheels locked  Call light within reach  RN notified  Family at bedside     COMMUNICATION/COLLABORATION:   The patients plan of care was discussed with: Registered Nurse    RAFFY Rodriguez  Time Calculation: 47 mins

## 2019-03-17 NOTE — PROGRESS NOTES
Bedside shift change report given to Virginie (oncoming nurse) by Kong Robles (offgoing nurse). Report included the following information SBAR, Kardex, Procedure Summary, Intake/Output, MAR and Recent Results.

## 2019-03-17 NOTE — PROGRESS NOTES
Patient is still awaiting her muscle biopsy results. She is off diuretics. She has spontaneous brisk diuresis- likely in ITZ recovering phase. Continue monitoring electrolytes.

## 2019-03-18 VITALS
OXYGEN SATURATION: 94 % | DIASTOLIC BLOOD PRESSURE: 85 MMHG | WEIGHT: 282.63 LBS | SYSTOLIC BLOOD PRESSURE: 139 MMHG | HEART RATE: 95 BPM | RESPIRATION RATE: 18 BRPM | TEMPERATURE: 100 F | HEIGHT: 63 IN | BODY MASS INDEX: 50.08 KG/M2

## 2019-03-18 LAB
ALBUMIN SERPL-MCNC: 2 G/DL (ref 3.5–5)
ALBUMIN/GLOB SERPL: 0.6 {RATIO} (ref 1.1–2.2)
ALP SERPL-CCNC: 60 U/L (ref 45–117)
ALT SERPL-CCNC: 293 U/L (ref 12–78)
ANION GAP SERPL CALC-SCNC: 4 MMOL/L (ref 5–15)
AST SERPL-CCNC: 218 U/L (ref 15–37)
BILIRUB DIRECT SERPL-MCNC: <0.1 MG/DL (ref 0–0.2)
BILIRUB SERPL-MCNC: 0.3 MG/DL (ref 0.2–1)
BUN SERPL-MCNC: 21 MG/DL (ref 6–20)
BUN/CREAT SERPL: 18 (ref 12–20)
CALCIUM SERPL-MCNC: 8.8 MG/DL (ref 8.5–10.1)
CHLORIDE SERPL-SCNC: 107 MMOL/L (ref 97–108)
CO2 SERPL-SCNC: 30 MMOL/L (ref 21–32)
CREAT SERPL-MCNC: 1.17 MG/DL (ref 0.55–1.02)
ERYTHROCYTE [DISTWIDTH] IN BLOOD BY AUTOMATED COUNT: 15 % (ref 11.5–14.5)
FLUAV AG NPH QL IA: NEGATIVE
FLUBV AG NOSE QL IA: NEGATIVE
GLOBULIN SER CALC-MCNC: 3.2 G/DL (ref 2–4)
GLUCOSE SERPL-MCNC: 135 MG/DL (ref 65–100)
HCT VFR BLD AUTO: 29.8 % (ref 35–47)
HGB BLD-MCNC: 9.4 G/DL (ref 11.5–16)
LIPASE SERPL-CCNC: 177 U/L (ref 73–393)
MAGNESIUM SERPL-MCNC: 1.3 MG/DL (ref 1.6–2.4)
MCH RBC QN AUTO: 28 PG (ref 26–34)
MCHC RBC AUTO-ENTMCNC: 31.5 G/DL (ref 30–36.5)
MCV RBC AUTO: 88.7 FL (ref 80–99)
NRBC # BLD: 0 K/UL (ref 0–0.01)
NRBC BLD-RTO: 0 PER 100 WBC
PHOSPHATE SERPL-MCNC: 2.1 MG/DL (ref 2.6–4.7)
PLATELET # BLD AUTO: 217 K/UL (ref 150–400)
PMV BLD AUTO: 9 FL (ref 8.9–12.9)
POTASSIUM SERPL-SCNC: 3.2 MMOL/L (ref 3.5–5.1)
PROT SERPL-MCNC: 5.2 G/DL (ref 6.4–8.2)
RBC # BLD AUTO: 3.36 M/UL (ref 3.8–5.2)
SODIUM SERPL-SCNC: 141 MMOL/L (ref 136–145)
WBC # BLD AUTO: 9 K/UL (ref 3.6–11)

## 2019-03-18 PROCEDURE — 83735 ASSAY OF MAGNESIUM: CPT

## 2019-03-18 PROCEDURE — 74011250636 HC RX REV CODE- 250/636: Performed by: NURSE PRACTITIONER

## 2019-03-18 PROCEDURE — 74011250637 HC RX REV CODE- 250/637: Performed by: INTERNAL MEDICINE

## 2019-03-18 PROCEDURE — 80048 BASIC METABOLIC PNL TOTAL CA: CPT

## 2019-03-18 PROCEDURE — C9113 INJ PANTOPRAZOLE SODIUM, VIA: HCPCS | Performed by: INTERNAL MEDICINE

## 2019-03-18 PROCEDURE — 74011000250 HC RX REV CODE- 250: Performed by: INTERNAL MEDICINE

## 2019-03-18 PROCEDURE — 84100 ASSAY OF PHOSPHORUS: CPT

## 2019-03-18 PROCEDURE — 87804 INFLUENZA ASSAY W/OPTIC: CPT

## 2019-03-18 PROCEDURE — 74011250636 HC RX REV CODE- 250/636: Performed by: INTERNAL MEDICINE

## 2019-03-18 PROCEDURE — 80076 HEPATIC FUNCTION PANEL: CPT

## 2019-03-18 PROCEDURE — 83690 ASSAY OF LIPASE: CPT

## 2019-03-18 PROCEDURE — 36415 COLL VENOUS BLD VENIPUNCTURE: CPT

## 2019-03-18 PROCEDURE — 85027 COMPLETE CBC AUTOMATED: CPT

## 2019-03-18 RX ORDER — LANOLIN ALCOHOL/MO/W.PET/CERES
400 CREAM (GRAM) TOPICAL DAILY
Qty: 30 TAB | Refills: 0 | Status: SHIPPED | OUTPATIENT
Start: 2019-03-18 | End: 2022-09-20 | Stop reason: ALTCHOICE

## 2019-03-18 RX ORDER — MAGNESIUM SULFATE HEPTAHYDRATE 40 MG/ML
2 INJECTION, SOLUTION INTRAVENOUS ONCE
Status: COMPLETED | OUTPATIENT
Start: 2019-03-18 | End: 2019-03-18

## 2019-03-18 RX ORDER — ONDANSETRON 4 MG/1
4 TABLET, ORALLY DISINTEGRATING ORAL
Qty: 30 TAB | Refills: 1 | Status: SHIPPED | OUTPATIENT
Start: 2019-03-18 | End: 2022-09-20 | Stop reason: ALTCHOICE

## 2019-03-18 RX ORDER — POTASSIUM CHLORIDE 1.5 G/1.77G
40 POWDER, FOR SOLUTION ORAL DAILY
Qty: 60 PACKET | Refills: 0 | Status: SHIPPED | OUTPATIENT
Start: 2019-03-18 | End: 2022-09-20 | Stop reason: ALTCHOICE

## 2019-03-18 RX ORDER — OXYCODONE HCL 5 MG/5 ML
5 SOLUTION, ORAL ORAL
Qty: 45 ML | Refills: 0 | Status: SHIPPED | OUTPATIENT
Start: 2019-03-18 | End: 2019-03-21

## 2019-03-18 RX ORDER — SODIUM,POTASSIUM PHOSPHATES 280-250MG
1 POWDER IN PACKET (EA) ORAL ONCE
Status: COMPLETED | OUTPATIENT
Start: 2019-03-18 | End: 2019-03-18

## 2019-03-18 RX ADMIN — Medication 10 ML: at 08:32

## 2019-03-18 RX ADMIN — Medication 10 ML: at 14:09

## 2019-03-18 RX ADMIN — MAGNESIUM SULFATE HEPTAHYDRATE 2 G: 40 INJECTION, SOLUTION INTRAVENOUS at 08:35

## 2019-03-18 RX ADMIN — ONDANSETRON 4 MG: 2 INJECTION INTRAMUSCULAR; INTRAVENOUS at 11:49

## 2019-03-18 RX ADMIN — POTASSIUM CHLORIDE 40 MEQ: 1.5 POWDER, FOR SOLUTION ORAL at 08:28

## 2019-03-18 RX ADMIN — POTASSIUM & SODIUM PHOSPHATES POWDER PACK 280-160-250 MG 1 PACKET: 280-160-250 PACK at 14:17

## 2019-03-18 RX ADMIN — SODIUM CHLORIDE 40 MG: 9 INJECTION, SOLUTION INTRAMUSCULAR; INTRAVENOUS; SUBCUTANEOUS at 08:28

## 2019-03-18 RX ADMIN — MAGNESIUM SULFATE HEPTAHYDRATE 2 G: 40 INJECTION, SOLUTION INTRAVENOUS at 10:42

## 2019-03-18 RX ADMIN — Medication 5 MG: at 02:32

## 2019-03-18 RX ADMIN — BACITRACIN 1 PACKET: 500 OINTMENT TOPICAL at 16:48

## 2019-03-18 NOTE — DISCHARGE SUMMARY
Discharge Summary     PATIENT ID: Johnny Plasencia  MRN: [id number]   YOB: 1990    DATE OF ADMISSION: 3/3/2019  9:59 PM    DATE OF DISCHARGE: 3/18/2019   PRIMARY CARE PROVIDER: None, Pt to see Dr. Mark Durbin: Dr. Leny Williamson  DISCHARGING PROVIDER: Joelle Kaur NP. To contact this individual call [phone] and ask the  to page. If unavailable ask to be transferred the Adult Hospitalist Department. CONSULTATIONS: IP CONSULT TO GENERAL SURGERY  IP CONSULT TO GENERAL SURGERY  IP CONSULT TO NEPHROLOGY  IP CONSULT TO NEUROLOGY  IP CONSULT TO GASTROENTEROLOGY    PROCEDURES/SURGERIES: Procedure(s):  LEFT THIGH MUSCLE BIOPSY    ADMITTING [facility] COURSE:   Pt presented to the ED with generalized body aches and soreness with any movement. She has a hx of rhabdomyolysis x 2 other times in her life. Reported a mild cold but denied ay strenuous activity or heavy exertion. She hasn't started any new prescription meds, but started taking a weight loss pill called \"thermofight-x\" ~ 1 week PTA. She also reported she noticed her urine had turned a dark brown color. Due to her hx of rhabdo, she noted these symptoms and came to the ED with expectation of the diagnosis. DISCHARGE DIAGNOSES / PLAN:      Rhabdomyolysis: resolving, was severe on admission CK > million:   - etiology is uncertain, but likely genetic, 3rd episode, and family members also have this. Significant hypokalemia on admit which could precipitate rhabdo. She also started taking a weight-loss pill called Thermofight-X which contains multiple ingredients including chromium and a \"proprietery blend. \"   - UDS negative and denies any sort of illicit drug   - pain controlled now, will d/c with limited amount of oxicodone - she is aware  - TSH 0.30; JAMARCUS negative  - muscle bx done 3/11, results pending - will continue to follow up post discharge     N/V:   - improved today  - GI consulted, pt declined EGD or further testing  - Continue anti-emetics prn on discharge  - diet as tolerated     LE edema and anasarca: from overall fluid balance while admitted  - improved     Tachycardia: stable  - no pain, no shortness of breath or hypoxia. - EKG appeared Sinus Tach  - patient previously refused CTA     Transaminitis: improving     Hypokalemia: ordered repletion for discharge, recheck labs in ~ 3-5 days    Hypomagnesemia: replaced IV today x 2 and will send home on replacement     Hypocalcemia: Resolved; Calcium has corrected     Hypophosphatemia: was given a dose of neutraphos     ITZ: secondary to ATN from rhabdomyolysis resolved  - BMP ordered for this week, pt to follow up with PCP  - encouraged po fluids     Loose Stool: Resolved     PENDING TEST RESULTS:   At the time of discharge the following test results are still pending: muscle biopsy    FOLLOW UP APPOINTMENTS:    Follow-up Information     Follow up With Specialties Details Why Contact Info    Chani Woodruff MD Internal Medicine On 4/2/2019  [address]  [phone]           ADDITIONAL CARE RECOMMENDATIONS:   May take Zofran as needed for nausea    Need to increase potassium foods in diet in addition to supplements that have been ordered for you. Please take potassium and magnesium supplements as ordered. Have BMP checked by Friday of this week. DIET: Regular Diet  Oral Nutritional Supplements: ensure or carnation instant breakfast 2-3 times daily until appetite and intakes improve    ACTIVITY: Activity as tolerated    DISCHARGE MEDICATIONS:  Current Discharge Medication List      START taking these medications    Details   ondansetron (ZOFRAN ODT) 4 mg disintegrating tablet Take 1 Tab by mouth every six (6) hours as needed for Nausea. Qty: 30 Tab, Refills: 1      magnesium oxide (MAG-OX) 400 mg tablet Take 1 Tab by mouth daily.   Qty: 30 Tab, Refills: 0      potassium chloride (KLOR-CON) 20 mEq pack Take 2 Packets by mouth daily. Qty: 60 Packet, Refills: 0      oxyCODONE (ROXICODONE) 5 mg/5 mL solution Take 5 mL by mouth every four (4) hours as needed for Moderate Pain for up to 3 days. Max Daily Amount: 30 mg.  Qty: 45 mL, Refills: 0    Associated Diagnoses: Non-traumatic rhabdomyolysis      OTHER,NON-FORMULARY, Please obtain BMP/Magnesium. Please call or fax results to number below. Pt will be seeing Dr. Aleta Carroll on 4/2/2019    Dr. Aleta Carroll  [phone]  Qty: 1 Each, Refills: 0         STOP taking these medications       OTHER Comments:   Reason for Stopping:             NOTIFY YOUR PHYSICIAN FOR ANY OF THE FOLLOWING:   Fever over 101 degrees for 24 hours. Chest pain, shortness of breath, fever, chills, nausea, vomiting, diarrhea, change in mentation, falling, weakness, bleeding. Severe pain or pain not relieved by medications. Or, any other signs or symptoms that you may have questions about. DISPOSITION:    Home With:   OT  PT  HH  RN       Long term SNF/Inpatient Rehab    Independent/assisted living    Hospice   xx Other: Home     PATIENT CONDITION AT DISCHARGE:   Functional status    Poor     Deconditioned    xx Independent      Cognition   xx  Lucid     Forgetful     Dementia      Catheters/lines (plus indication)    Holley     PICC     PEG    xx None      Code status   xx  Full code     DNR      PHYSICAL EXAMINATION AT DISCHARGE:  /85 (BP 1 Location: Left arm, BP Patient Position: At rest)   Pulse 95   Temp 100 °F (37.8 °C)   Resp 18   Ht 5' 3\" (1.6 m)   Wt 128.2 kg (282 lb 10.1 oz)   SpO2 94%   BMI 50.07 kg/m²     Pt in bed, wants to go home today. Had tolerated some po foods overnight. Had a low grade temp (100.6) which decreased to 100 without medication intervention. No current chills, rigors. No cough - only coughing from time to time with clear sputum. No SOB, no urinary complaints. Rapid Flu was negative and CBC unremarkable prior to d/c.     Discussed discharge instructions including importance of po intake of foods, fluids and meds in addition to having labs drawn prior to Friday of this week. She will be seeing a new PCP on Friday. Constitutional:  Cooperative, pleasant. Still very flat affect. Resp:  CTA bilaterally. No wheezing. No accessory muscle use and on RA. CV:  Regular rhythm, tachycardic rate. No murmurs. GI:  Obese. Non distended, non tender. Normoactive bowel sounds. Musculoskeletal:  Generalized edema to all extremities     Neurologic:  Moves all extremities. AAOx3. Sensation is intact. No focal deficits   PICC: Placed 3/6 (right), order to remove this evening prior to d/c     CHRONIC MEDICAL DIAGNOSES:  Problem List as of 3/18/2019 Date Reviewed: 3/18/2019          Codes Class Noted - Resolved    Hypokalemia ICD-10-CM: E87.6  ICD-9-CM: 276.8  3/4/2019 - Present        Traumatic rhabdomyolysis ([facility] [address] ICD-10-CM: T79. 6XXA  ICD-9-CM: 958.6  5/12/2016 - Present        * (Principal) RESOLVED: Rhabdomyolysis ICD-10-CM: T09.50  ICD-9-CM: 728.88  5/4/2016 - 3/18/2019            Greater than 30 minutes were spent with the patient on counseling and coordination of care    Signed:   Deric Dimas NP  3/18/2019  4:12 PM

## 2019-03-18 NOTE — PROGRESS NOTES
1930: Assumed care of patient, no complaints of N/V or pain  2100: Pt. Still has no complaints of N/v or pain  2200: Pt. Is comfortable with no complaint of N/V  0000: Pt. Refused scheduled zofran, no complaints of N/v    Patient has verbalized that she does not want to have the Ultrasound exam in the morning.

## 2019-03-18 NOTE — PROGRESS NOTES
Name: Anila Luther MRN: [id number]   : 1990 [facility]   Date: 3/18/2019        IMPRESSION:   · Severe rhabdomyolysis - now resolving. · ITZ- stable, non oliguric, resolved  · Hemoglobinuria from Rhabdo. · Multiple electrolytes abnormalities  ·   Hypocalcemia- currently asymptomatic. ·   Hypophosphatemia   · Hypervolemia  · Hypokalemia- . PLAN:   · Encourage po intake   · Discontinue Tums. · Give K phos 1 packet x1   · Muscle biopsy results are pending. · Replace Mg   · Recheck RFP in am      Subjective/Interval History:   I have reviewed the flowsheet and previous days notes. ROS: Pt is feels okay overall today. Denies any new complaints. Muscle Bx results results are pending     Objective:   Vital Signs:    Visit Vitals  /85 (BP 1 Location: Left arm, BP Patient Position: At rest)   Pulse 95   Temp 100 °F (37.8 °C)   Resp 18   Ht 5' 3\" (1.6 m)   Wt 128.2 kg (282 lb 10.1 oz)   SpO2 94%   BMI 50.07 kg/m²       O2 Device: Room air   O2 Flow Rate (L/min): 2 l/min   Temp (24hrs), Av.3 °F (37.4 °C), Min:98 °F (36.7 °C), Max:100.6 °F (38.1 °C)       Intake/Output:   Last shift:       0701 -  1900  In: -   Out: 1950 [Urine:1950]  Last 3 shifts:  190 -  0700  In: -   Out: 2491 [Urine:3250]    Intake/Output Summary (Last 24 hours) at 3/18/2019 1406  Last data filed at 3/18/2019 1100  Gross per 24 hour   Intake --   Output 3500 ml   Net -3500 ml        Physical Exam:  General:    Alert, cooperative, no distress, appears stated age. Sleepy. Head:   Normocephalic, without obvious abnormality, atraumatic. Eyes:   Conjunctivae/corneas clear. Lungs:   Clear to auscultation bilaterally. No Wheezing or Rhonchi. No rales. Chest wall:  No tenderness or deformity. No Accessory muscle use. Heart:   Regular rate and rhythm,  no murmur, rub or gallop. Abdomen:   Soft, non-tender. Not distended. Bowel sounds normal. No masses. Extremities: Extremities normal, atraumatic, No cyanosis. 2+ edema. No clubbing  Skin:     Texture, turgor normal. No rashes or lesions. Not Jaundiced  Psych:  Good insight. Not depressed. Not anxious or agitated. Flat affect. Neurologic: Normal strength, Alert and oriented X 3. DATA:  Labs:  Recent Labs     03/18/19  0235 03/17/19  1644 03/17/19  0653 03/16/19 2015 03/16/19  0542    142 142 141 142   K 3.2* 3.2* 2.9* 2.7* 2.6*    107 107 107 107   CO2 30 31 29 30 30   BUN 21* 22* 21* 23* 24*   CREA 1.17* 1.03* 1.12* 1.06* 1.06*   CA 8.8 8.7 8.9 8.9 8.8   ALB 2.0*  --   --   --   --    PHOS 2.1*  --  2.5*  --  2.2*   MG 1.3*  --   --  1.5* 1.6     No results for input(s): WBC, HGB, HCT, PLT, HGBEXT, HCTEXT, PLTEXT, HGBEXT, HCTEXT, PLTEXT in the last 72 hours. No results for input(s): VICKI, KU, CLU, CREAU in the last 72 hours.     No lab exists for component: PROU    Total time spent with patient:  35 minutes    [] Critical Care Provided    Care Plan discussed with:   Family, Medical Team    Alejandro Renee MD

## 2019-03-18 NOTE — PROGRESS NOTES
Physical Therapy  Attempted to see pt for the 2nd time today, pt declined therapy at this time, reports she is being discharged, pt reports she has been ambulating to/from the bathroom over the weekend, she is waiting for her mom and states will get up when she is here. PT recommended home health PT versus SNF on previous visit. Recommend home health PT at this time if pt agreeable with plan.    Gris Vera PT

## 2019-03-18 NOTE — PROGRESS NOTES
Chart reviewed and spoke to PT who stated she is deferring therapy at this time, stating she is going home with her mother today and unwilling to participate. Will follow up for OT intervention as able and appropriate. Thank you.

## 2019-03-18 NOTE — PROGRESS NOTES
Physical Therapy  Chart reviewed and pt cleared for therapy per RN, spoke with pt however she c/o \"not feeling good\" and declined therapy at this time, pt vague and did not give specifics but denied nausea or severe pain, will follow up later today  Karen Woodward PT

## 2019-03-18 NOTE — DISCHARGE INSTRUCTIONS
Discharge Instructions     PATIENT ID: Anila Ltuher  MRN: [id number]   YOB: 1990    DATE OF ADMISSION: 3/3/2019  9:59 PM    DATE OF DISCHARGE: 3/18/2019  PRIMARY CARE PROVIDER: None - will be seeing Dr. Aleks Horowitz 4/2/2019  ATTENDING PHYSICIAN: Eleanor Cotto I*  DISCHARGING PROVIDER: Amanda Mccartney NP. To contact this individual call [phone] and ask the  to page. If unavailable ask to be transferred the Adult Hospitalist Department. DISCHARGE DIAGNOSES   Rhabdomyolosis  Nausea/Vomiting  Hypokalemia  Hypomagnesemia  Acute Kidney Injury    CONSULTATIONS: IP CONSULT TO GENERAL SURGERY  IP CONSULT TO GENERAL SURGERY  IP CONSULT TO NEPHROLOGY  IP CONSULT TO NEUROLOGY  IP CONSULT TO GASTROENTEROLOGY    PROCEDURES/SURGERIES: Procedure(s):  LEFT THIGH MUSCLE BIOPSY    FOLLOW UP APPOINTMENTS:   Follow-up Information     Follow up With Specialties Details Why Contact Info    Lashell Hernandez MD Internal Medicine On 4/2/2019  [address]  [phone]           ADDITIONAL CARE RECOMMENDATIONS:   May take Zofran as needed for nausea    Need to increase potassium foods in diet in addition to supplements that have been ordered for you. Please take potassium and magnesium supplements as ordered. Have BMP checked by Friday of this week. DIET: Regular Diet  Oral Nutritional Supplements: ensure or carnation instant breakfast 2-3 times daily until appetite and intakes improve    ACTIVITY: Activity as tolerated    · It is important that you take the medication exactly as they are prescribed. · Keep your medication in the bottles provided by the pharmacist and keep a list of the medication names, dosages, and times to be taken in your wallet. · Do not take other medications without consulting your doctor. NOTIFY YOUR PHYSICIAN FOR ANY OF THE FOLLOWING:   Fever over 101 degrees for 24 hours.    Chest pain, shortness of breath, fever, chills, nausea, vomiting, diarrhea, change in mentation, falling, weakness, bleeding. Severe pain or pain not relieved by medications. Or, any other signs or symptoms that you may have questions about.     DISPOSITION:    Home With:   OT  PT  HH  RN       SNF/Inpatient Rehab/LTAC    Independent/assisted living    Hospice   xx Other: Home     CDMP Checked:   Yes *x*     PROBLEM LIST Updated:  Yes *x*     Signed:   Vianey Garcia NP  3/18/2019  1:27 PM

## 2019-03-18 NOTE — PROGRESS NOTES
A Spiritual Care Partner Volunteer visited patient in Rm 537 on 3/18/2019.   Documented by:  Chaplain Krishnamurthy MDiv, MS, [address] [place] [address] ([id number])

## 2019-03-18 NOTE — PROGRESS NOTES
Cm met with patient at the bedside to ask her if she wanted my assistance making a new PCP appointment. Patient did not acknowledge me, but her friend/family member sitting next to her stated that the mother was going to take care of that. No further cm needs.     Piyush AGGARWALW, ACM

## 2019-03-18 NOTE — PROGRESS NOTES
Diane [address] March 18, 2019       RE: Rafael Hankins      To Whom It May Concern,    This is to certify that Rafael Hankins was discharged from the hospital 3/18/2019 and her , Morris Paredes was present at her bedside to assist with her discharge and care. Please feel free to contact my office if you have any questions or concerns. Thank you for your assistance in this matter.     Sincerely,  Luis Hines, JAYDEN   [phone]

## 2019-03-18 NOTE — PROGRESS NOTES
[address]  [address]   [phone]                GI PROGRESS NOTE  Lupillo Wright AGACNP-BC  Work Cell: [phone]      NAME:   Sheron Arias   :    1990   MRN:    [id number]     Assessment/Plan   1. Nausea and vomiting - currently hospitalized for prolonged recovery from rhabdomyolysis. Symptoms improved. Tolerating diet. - Diet as tolerated  - Supportive management per primary team  - Anti-emetics PRN  - Replete electrolytes   - Refusing any further GI work-up     Will see PRN. Please call with any questions. Patient Active Problem List   Diagnosis Code    Traumatic rhabdomyolysis ([facility] [address] T79. 6XXA    Rhabdomyolysis M62.82    Hypokalemia E87.6       Subjective:     Denies any complaints including n/v. States she is eating and drinking without difficulty. Refusing ultrasound. Informs me she \"is going home today whether she is discharged or not. \"       Objective:     VITALS:   Last 24hrs VS reviewed since prior hospitalist progress note. Most recent are:  Visit Vitals  /85 (BP 1 Location: Left arm, BP Patient Position: At rest)   Pulse 95   Temp (!) 100.6 °F (38.1 °C)   Resp 18   Ht 5' 3\" (1.6 m)   Wt 128.2 kg (282 lb 10.1 oz)   SpO2 94%   BMI 50.07 kg/m²       Intake/Output Summary (Last 24 hours) at 3/18/2019 1002  Last data filed at 3/18/2019 0734  Gross per 24 hour   Intake --   Output 3650 ml   Net -3650 ml        PHYSICAL EXAM:  General   well developed, alert, obese, in no acute distress  EENT  Normocephalic, Atraumatic, PERRLA, EOMI, sclera clear  Respiratory   Clear To Auscultation bilaterally - no wheezes, rales, rhonchi, or crackles  Cardiology  Regular Rate and Rythmn  - no murmurs, rubs or gallops  Abdominal  Soft, non-tender, non-distended, positive bowel sounds, no hepatosplenomegaly, no palpable mass. Neurological  No focal neurological deficits noted  Psychological  Oriented x 3. Normal affect.        Lab Data Recent Results (from the past 12 hour(s))   METABOLIC PANEL, BASIC    Collection Time: 03/18/19  2:35 AM   Result Value Ref Range    Sodium 141 136 - 145 mmol/L    Potassium 3.2 (L) 3.5 - 5.1 mmol/L    Chloride 107 97 - 108 mmol/L    CO2 30 21 - 32 mmol/L    Anion gap 4 (L) 5 - 15 mmol/L    Glucose 135 (H) 65 - 100 mg/dL    BUN 21 (H) 6 - 20 MG/DL    Creatinine 1.17 (H) 0.55 - 1.02 MG/DL    BUN/Creatinine ratio 18 12 - 20      GFR est AA >60 >60 ml/min/1.73m2    GFR est non-AA 55 (L) >60 ml/min/1.73m2    Calcium 8.8 8.5 - 10.1 MG/DL   LIPASE    Collection Time: 03/18/19  2:35 AM   Result Value Ref Range    Lipase 177 73 - 393 U/L   HEPATIC FUNCTION PANEL    Collection Time: 03/18/19  2:35 AM   Result Value Ref Range    Protein, total 5.2 (L) 6.4 - 8.2 g/dL    Albumin 2.0 (L) 3.5 - 5.0 g/dL    Globulin 3.2 2.0 - 4.0 g/dL    A-G Ratio 0.6 (L) 1.1 - 2.2      Bilirubin, total 0.3 0.2 - 1.0 MG/DL    Bilirubin, direct <0.1 0.0 - 0.2 MG/DL    Alk.  phosphatase 60 45 - 117 U/L    AST (SGOT) 218 (H) 15 - 37 U/L    ALT (SGPT) 293 (H) 12 - 78 U/L   MAGNESIUM    Collection Time: 03/18/19  2:35 AM   Result Value Ref Range    Magnesium 1.3 (L) 1.6 - 2.4 mg/dL   PHOSPHORUS    Collection Time: 03/18/19  2:35 AM   Result Value Ref Range    Phosphorus 2.1 (L) 2.6 - 4.7 MG/DL         Medications: Reviewed    PMH/ reviewed - no change compared to H&P  Mid-Level Provider: Baldo Gonzalez NP   Date/Time:  3/18/2019

## 2019-03-23 LAB
BUN SERPL-MCNC: 17 MG/DL (ref 6–20)
BUN/CREAT SERPL: 16 (ref 9–23)
CALCIUM SERPL-MCNC: 8.3 MG/DL (ref 8.7–10.2)
CHLORIDE SERPL-SCNC: 98 MMOL/L (ref 96–106)
CO2 SERPL-SCNC: 23 MMOL/L (ref 20–29)
CREAT SERPL-MCNC: 1.04 MG/DL (ref 0.57–1)
GLUCOSE SERPL-MCNC: 111 MG/DL (ref 65–99)
MAGNESIUM SERPL-MCNC: 1.8 MG/DL (ref 1.6–2.3)
POTASSIUM SERPL-SCNC: 3.6 MMOL/L (ref 3.5–5.2)
SODIUM SERPL-SCNC: 139 MMOL/L (ref 134–144)

## 2019-03-26 ENCOUNTER — TELEPHONE (OUTPATIENT)
Dept: SURGERY | Age: 29
End: 2019-03-26

## 2019-03-26 NOTE — PROGRESS NOTES
Received pathology results and reviewed with attending MD. Muscle tissue was difficult to examine and analyze due to amount of necrosis to tissue. Pathologists recommended a genetic approach to diagnosis (as rhabdomyolosis has occurred multiple times). Patient was called at home number but there was no answer. Due to privacy, no message was left. A letter will be sent to her home address with the pathology report attached. My name and phone number were also included for questions in addition to the phone numbers to [employer] (both general information and appointment phone numbers).

## 2019-04-02 ENCOUNTER — OFFICE VISIT (OUTPATIENT)
Dept: FAMILY MEDICINE CLINIC | Age: 29
End: 2019-04-02

## 2019-04-02 ENCOUNTER — TELEPHONE (OUTPATIENT)
Dept: FAMILY MEDICINE CLINIC | Age: 29
End: 2019-04-02

## 2019-04-02 VITALS
RESPIRATION RATE: 20 BRPM | HEIGHT: 63 IN | WEIGHT: 222 LBS | DIASTOLIC BLOOD PRESSURE: 79 MMHG | TEMPERATURE: 97.5 F | HEART RATE: 68 BPM | SYSTOLIC BLOOD PRESSURE: 123 MMHG | OXYGEN SATURATION: 98 % | BODY MASS INDEX: 39.34 KG/M2

## 2019-04-02 DIAGNOSIS — Z13.29 SCREENING FOR THYROID DISORDER: ICD-10-CM

## 2019-04-02 DIAGNOSIS — Z01.419 WOMEN'S ANNUAL ROUTINE GYNECOLOGICAL EXAMINATION: ICD-10-CM

## 2019-04-02 DIAGNOSIS — E55.9 VITAMIN D DEFICIENCY: Primary | ICD-10-CM

## 2019-04-02 DIAGNOSIS — Z13.220 SCREENING CHOLESTEROL LEVEL: ICD-10-CM

## 2019-04-02 DIAGNOSIS — M62.82 NON-TRAUMATIC RHABDOMYOLYSIS: ICD-10-CM

## 2019-04-02 PROBLEM — E66.01 SEVERE OBESITY (HCC): Status: ACTIVE | Noted: 2019-04-02

## 2019-04-02 RX ORDER — CHOLECALCIFEROL TAB 125 MCG (5000 UNIT) 125 MCG
5000 TAB ORAL DAILY
Qty: 90 TAB | Refills: 1 | Status: SHIPPED | OUTPATIENT
Start: 2019-04-02 | End: 2022-09-20 | Stop reason: ALTCHOICE

## 2019-04-02 NOTE — PATIENT INSTRUCTIONS
Learning About Vitamin D  Why is it important to get enough vitamin D? Your body needs vitamin D to absorb calcium. Calcium keeps your bones and muscles, including your heart, healthy and strong. If your muscles don't get enough calcium, they can cramp, hurt, or feel weak. You may have long-term (chronic) muscle aches and pains. If you don't get enough vitamin D throughout life, you have an increased chance of having thin and brittle bones (osteoporosis) in your later years. Children who don't get enough vitamin D may not grow as much as others their age. They also have a chance of getting a rare disease called rickets. It causes weak bones. Vitamin D and calcium are added to many foods. And your body uses sunshine to make its own vitamin D. How much vitamin D do you need? The [place] [employer] recommends that people ages 3 through 79 get 600 IU (international units) every day. Adults 71 and older need 800 IU every day. Blood tests for vitamin D can check your vitamin D level. But there is no standard normal range used by all laboratories. You're likely getting enough vitamin D if your levels are in the range of 20 to 50 ng/mL. How can you get more vitamin D? Foods that contain vitamin D include:  · [place], tuna, and mackerel. These are some of the best foods to eat when you need to get more vitamin D.  · Cheese, egg yolks, and beef liver. These foods have vitamin D in small amounts. · Milk, soy drinks, orange juice, yogurt, margarine, and some kinds of cereal have vitamin D added to them. Some people don't make vitamin D as well as others. They may have to take extra care in getting enough vitamin D. Things that reduce how much vitamin D your body makes include:  · Dark skin, such as many  Americans have. · Age, especially if you are older than 72. · Digestive problems, such as Crohn's or celiac disease. · Liver and kidney disease.   Some people who do not get enough vitamin D may need supplements. Are there any risks from taking vitamin D?  · Too much vitamin D:  ? Can damage your kidneys. ? Can cause nausea and vomiting, constipation, and weakness. ? Raises the amount of calcium in your blood. If this happens, you can get confused or have an irregular heart rhythm. · Vitamin D may interact with other medicines. Tell your doctor about all of the medicines you take, including over-the-counter drugs, herbs, and pills. Tell your doctor about all of your current medical problems. Where can you learn more? Go to http://ronni-bar.info/. Enter [id number] in the search box to learn more about \"Learning About Vitamin D.\"  Current as of: March 28, 2018  Content Version: 11.9  © 2784-8396 [employer]. Care instructions adapted under license by [employer] (which disclaims liability or warranty for this information). If you have questions about a medical condition or this instruction, always ask your healthcare professional. [place] [address] any warranty or liability for your use of this information.

## 2019-04-02 NOTE — LETTER
NOTIFICATION RETURN TO WORK / SCHOOL 
 
4/2/2019 2:45 PM 
 
Ms. Yinka Betancur [address] [facility] [address] To Whom It May Concern: Yinka Betancur is currently under the care of Jada Howe. Patient was accompanied by spouse, Mr. Rene Puente to clinic today. She will return to work/school on: 4/2/2019 If there are questions or concerns please feel free to contact our office. Sincerely, Shlomo Castillo MD

## 2019-04-02 NOTE — LETTER
NOTIFICATION RETURN TO WORK / SCHOOL 
 
4/2/2019 2:50 PM 
 
Ms. Yinka Betancur [address] [facility] [address] To Whom It May Concern: Yinka Betancur is currently under the care of Jada Howe. Patient was accompanied by spouse, Mr. Holley Caballero to clinic today. He will return to work/school on: 4/2/2019 If there are questions or concerns please feel free to contact our office. Sincerely, Sana Morrow MD

## 2019-04-02 NOTE — PROGRESS NOTES
Chief Complaint   Patient presents with   [address] Patient   [facility] Follow Up    Letter for School/Work      wife and       HPI:  Van Restrepo is a 34 y.o. AA female presents accompanied by spouse to establish care. Patient was admitted 3/3/10-3/18/19 to [address] [place] [address] with Rhabdomyolysis. She is s/p left thigh muscle biopsy. Pathology showed necrotic muscle. Today, she still c/o muscle pain. Last CPK is 18,000 on 3/15/2019. I may have to repeat level. Patient' spouse is requesting a covering letter for work. Review of Systems  Constitutional: negative for fevers/chills  Eyes:   negative for visual disturbance   Respiratory:  negative for cough, dyspnea, wheezing  CV:   negative for chest pain, palpitations, lower extremity edema  GI:   negative for abdominal pain  Endo:               negative for polyuria,polydipsia,polyphagia,heat intolerance  Genitourinary: negative for frequency, dysuria   Integument:  negative for rash and pruritus  Musculoskel: positive for myalgias, thigh pain, joint pain  Neurological:  negative for headaches, dizziness  Behavl/Psych: negative for feelings of anxiety, depression, mood changes    Past Medical History:   Diagnosis Date    Rhabdomyolysis     Traumatic rhabdomyolysis ([facility] [address] 5/12/2016     History reviewed. No pertinent surgical history.   Social History     Socioeconomic History    Marital status:      Spouse name: Not on file    Number of children: Not on file    Years of education: Not on file    Highest education level: Not on file   Tobacco Use    Smoking status: Current Some Day Smoker    Smokeless tobacco: Never Used   Substance and Sexual Activity    Alcohol use: No     Comment: rare    Drug use: No    Sexual activity: Yes     Partners: Male     Birth control/protection: None   Social History Narrative    ** Merged History Encounter **          Family History   Problem Relation Age of Onset    Diabetes Father     Diabetes Maternal Grandmother     Diabetes Maternal Grandfather      Current Outpatient Medications   Medication Sig Dispense Refill    ondansetron (ZOFRAN ODT) 4 mg disintegrating tablet Take 1 Tab by mouth every six (6) hours as needed for Nausea. 30 Tab 1    magnesium oxide (MAG-OX) 400 mg tablet Take 1 Tab by mouth daily. 30 Tab 0    potassium chloride (KLOR-CON) 20 mEq pack Take 2 Packets by mouth daily. [facility], Please obtain BMP/Magnesium. Please call or fax results to number below. Pt will be seeing Dr. Christiano England on 4/2/2019    Dr. Christiano England  [phone] 1 Each 0     Allergies   Allergen Reactions    Compazine [Prochlorperazine Edisylate] Anaphylaxis     stroke    Midol [Acetaminophen-Pamabrom] Anaphylaxis     dont take again because of rabdo    Aspirin Other (comments)     Never take again because of rabdo    Compazine [Prochlorperazine Edisylate] Other (comments)     Stroke symptoms    Contrast Agent [Iodine] Other (comments)     Kidney problems    Ibuprofen Other (comments)     Kidney problem,rhabdo      Motrin [Ibuprofen] Nausea and Vomiting    Zofran [Ondansetron Hcl (Pf)] Nausea and Vomiting       Objective:  Visit Vitals  /79   Pulse 68   Temp 97.5 °F (36.4 °C) (Oral)   Resp 20   Ht 5' 3\" (1.6 m)   Wt 222 lb (100.7 kg)   LMP 03/06/2019   SpO2 98%   BMI 39.33 kg/m²     Physical Exam:   General appearance - alert, well appearing in mderate distress  Mental status - alert, oriented to person, place, and time  EYE-PERRL, EOMI  ENT-ENT exam normal, no neck nodes or sinus tenderness  Mouth - mucous membranes moist, pharynx normal without lesions  Neck - supple, no significant adenopathy   Chest - clear to auscultation, no wheezes, rales or rhonchi  Heart - normal rate, regular rhythm, normal blood pressure  Abdomen - soft, nontender, nondistended, no organomegaly  Ext-peripheral pulses normal, no pedal edema, no clubbing or cyanosis  Skin-Warm and dry.  no hyperpigmentation or suspicious lesions  Neuro -alert, oriented, normal speech, no focal findings     Results for orders placed or performed in visit on 60/31/92   METABOLIC PANEL, BASIC   Result Value Ref Range    Glucose 111 (H) 65 - 99 mg/dL    BUN 17 6 - 20 mg/dL    Creatinine 1.04 (H) 0.57 - 1.00 mg/dL    GFR est non-AA 73 >59 mL/min/1.73    GFR est AA 84 >59 mL/min/1.73    BUN/Creatinine ratio 16 9 - 23    Sodium 139 134 - 144 mmol/L    Potassium 3.6 3.5 - 5.2 mmol/L    Chloride 98 96 - 106 mmol/L    CO2 23 20 - 29 mmol/L    Calcium 8.3 (L) 8.7 - 10.2 mg/dL     Assessment/Plan:    Diagnoses and all orders for this visit:    1. Vitamin D deficiency  -     cholecalciferol, VITAMIN D3, (VITAMIN D3) 5,000 unit tab tablet; Take 1 Tab by mouth daily. 2. Women's annual routine gynecological examination  -     REFERRAL TO OBSTETRICS AND GYNECOLOGY    3. Non-traumatic rhabdomyolysis  -     METABOLIC PANEL, COMPREHENSIVE  -     CK    4. Screening for thyroid disorder  -     TSH 3RD GENERATION    5. Screening cholesterol level  -     LIPID PANEL      Patient Instructions        Learning About Vitamin D  Why is it important to get enough vitamin D? Your body needs vitamin D to absorb calcium. Calcium keeps your bones and muscles, including your heart, healthy and strong. If your muscles don't get enough calcium, they can cramp, hurt, or feel weak. You may have long-term (chronic) muscle aches and pains. If you don't get enough vitamin D throughout life, you have an increased chance of having thin and brittle bones (osteoporosis) in your later years. Children who don't get enough vitamin D may not grow as much as others their age. They also have a chance of getting a rare disease called rickets. It causes weak bones. Vitamin D and calcium are added to many foods. And your body uses sunshine to make its own vitamin D. How much vitamin D do you need?   The [place] of Medicine recommends that people ages 3 through 70 get 600 IU (international units) every day. Adults 71 and older need 800 IU every day. Blood tests for vitamin D can check your vitamin D level. But there is no standard normal range used by all laboratories. You're likely getting enough vitamin D if your levels are in the range of 20 to 50 ng/mL. How can you get more vitamin D? Foods that contain vitamin D include:  · [place], tuna, and mackerel. These are some of the best foods to eat when you need to get more vitamin D.  · Cheese, egg yolks, and beef liver. These foods have vitamin D in small amounts. · Milk, soy drinks, orange juice, yogurt, margarine, and some kinds of cereal have vitamin D added to them. Some people don't make vitamin D as well as others. They may have to take extra care in getting enough vitamin D. Things that reduce how much vitamin D your body makes include:  · Dark skin, such as many  Americans have. · Age, especially if you are older than 72. · Digestive problems, such as Crohn's or celiac disease. · Liver and kidney disease. Some people who do not get enough vitamin D may need supplements. Are there any risks from taking vitamin D?  · Too much vitamin D:  ? Can damage your kidneys. ? Can cause nausea and vomiting, constipation, and weakness. ? Raises the amount of calcium in your blood. If this happens, you can get confused or have an irregular heart rhythm. · Vitamin D may interact with other medicines. Tell your doctor about all of the medicines you take, including over-the-counter drugs, herbs, and pills. Tell your doctor about all of your current medical problems. Where can you learn more? Go to http://ronni-bar.info/. Enter [id number] in the search box to learn more about \"Learning About Vitamin D.\"  Current as of: March 28, 2018  Content Version: 11.9  © 7342-7119 [employer].  Care instructions adapted under license by [employer] (which disclaims liability or warranty for this information). If you have questions about a medical condition or this instruction, always ask your healthcare professional. [place] [address] any warranty or liability for your use of this information. Follow-up and Dispositions    · Return in about 1 month (around 4/30/2019), or 4-6 weeks, for routine follow up.

## 2019-04-02 NOTE — TELEPHONE ENCOUNTER
----- Message from Hoang Ha sent at 4/2/2019  3:13 PM EDT -----  Regarding: Shiv Sam MD/ telephone  Luisa Luong Pts mother states that the Pt was not treated during her F/up. Pt's mother would like a call back in reference to the Pt's treatment.  Pts contact [phone] or Pt at [phone]

## 2019-04-04 ENCOUNTER — DOCUMENTATION ONLY (OUTPATIENT)
Dept: FAMILY MEDICINE CLINIC | Age: 29
End: 2019-04-04

## 2019-04-04 NOTE — TELEPHONE ENCOUNTER
Call the mother of patient to express due to HIPPA laws can not give out any information regarding her daughter. She express great concerns about her daughter's condition. Suggest to ask her daughter to sign her on the HIPPA.

## 2020-08-06 NOTE — PROGRESS NOTES
Bedside and Verbal shift change report given to Lilliam Gamez RN (oncoming nurse) by Ignacio Harrell RN (offgoing nurse). Report included the following information SBAR. [facility]  [facility] [place]    1.  Stop estradiol  2.  Return for bone age x-ray  3.  Stop by clinic for a nurse visit (height/weight check)  4.  Staff to contact you to set both of these up  5.  Will contact you with bone age results  6.  Follow-up virtual visit again in about 4-5 months        Pediatric Call Center Scheduling and Nurse Questions:  [phone]  Eveline Ramos RN Care Coordinator    After Hours Needing Immediate Care:  [phone].  Ask for the on-call pediatric doctor for the specialty you are calling for be paged.  For dermatology urgent matters that cannot wait until the next business day, is over a holiday and/or a weekend please call [phone] and ask for the Dermatology Resident On-Call to be paged.    Prescription Renewals:  Please call your pharmacy first.  Your pharmacy must fax requests to [phone].  Please allow 2-3 days for prescriptions to be authorized.    If your physician has ordered a CT or MRI, you may schedule this test by calling [facility] in [place] at [phone].    **If your child is having a sedated procedure, they will need a history and physical done at their Primary Care Provider within 30 days of the procedure.  If your child was seen by the ordering provider in our office within 30 days of the procedure, their visit summary will work for the H&P unless they inform you otherwise.  If you have any questions, please call the RN Care Coordinator.**

## 2020-08-07 NOTE — PROGRESS NOTES
Patient is resting in bed family at the bedside. She has a very flat affect very quiet even with family present. She having generalize pain she was given meds at 1904. . She still will not help with care or try to help turn she really needs coaching. Will continue to monitor patient for changes in her condition. 2140 pain medication given and turned patient. 2230 patient given CHG bath and bed linen, and gown changed  changed     2340 pain meds given patient turned     0353 pain meds given patient turned blood drawn and sent to lab.    4750 patient given pain meds and second CHG bath given. 0730 Bedside and Verbal shift change report given to Leonel Aldridge RN (oncoming nurse) by Brigitte Orona RN  (offgoing nurse). Report included the following information SBAR, MAR, Recent Results and Med Rec Status. no

## 2021-12-26 ENCOUNTER — APPOINTMENT (OUTPATIENT)
Dept: GENERAL RADIOLOGY | Age: 31
DRG: 557 | End: 2021-12-26
Attending: HOSPITALIST
Payer: COMMERCIAL

## 2021-12-26 ENCOUNTER — HOSPITAL ENCOUNTER (INPATIENT)
Age: 31
LOS: 1 days | Discharge: HOME | DRG: 557 | End: 2021-12-27
Attending: EMERGENCY MEDICINE | Admitting: HOSPITALIST
Payer: COMMERCIAL

## 2021-12-26 DIAGNOSIS — M62.82 NON-TRAUMATIC RHABDOMYOLYSIS: Primary | ICD-10-CM

## 2021-12-26 DIAGNOSIS — R74.01 TRANSAMINITIS: ICD-10-CM

## 2021-12-26 DIAGNOSIS — U07.1 COVID-19: ICD-10-CM

## 2021-12-26 LAB
ALBUMIN SERPL-MCNC: 3.5 G/DL (ref 3.5–5)
ALBUMIN/GLOB SERPL: 0.8 {RATIO} (ref 1.1–2.2)
ALP SERPL-CCNC: 61 U/L (ref 45–117)
ALT SERPL-CCNC: 182 U/L (ref 12–78)
AMPHET UR QL SCN: NEGATIVE
ANION GAP SERPL CALC-SCNC: 5 MMOL/L (ref 5–15)
APPEARANCE UR: ABNORMAL
AST SERPL-CCNC: 797 U/L (ref 15–37)
ATRIAL RATE: 96 BPM
BACTERIA URNS QL MICRO: ABNORMAL /HPF
BARBITURATES UR QL SCN: NEGATIVE
BASOPHILS # BLD: 0 K/UL (ref 0–0.1)
BASOPHILS NFR BLD: 0 % (ref 0–1)
BENZODIAZ UR QL: NEGATIVE
BILIRUB SERPL-MCNC: 0.2 MG/DL (ref 0.2–1)
BILIRUB UR QL CFM: NEGATIVE
BUN SERPL-MCNC: 8 MG/DL (ref 6–20)
BUN/CREAT SERPL: 12 (ref 12–20)
BUN/CREAT SERPL: 12 (ref 12–20)
BUN/CREAT SERPL: 14 (ref 12–20)
CALCIUM SERPL-MCNC: 7.5 MG/DL (ref 8.5–10.1)
CALCIUM SERPL-MCNC: 8.3 MG/DL (ref 8.5–10.1)
CALCIUM SERPL-MCNC: 8.5 MG/DL (ref 8.5–10.1)
CALCULATED P AXIS, ECG09: 31 DEGREES
CALCULATED R AXIS, ECG10: 3 DEGREES
CANNABINOIDS UR QL SCN: POSITIVE
CHLORIDE SERPL-SCNC: 104 MMOL/L (ref 97–108)
CHLORIDE SERPL-SCNC: 105 MMOL/L (ref 97–108)
CHLORIDE SERPL-SCNC: 110 MMOL/L (ref 97–108)
CK SERPL-CCNC: ABNORMAL U/L (ref 26–192)
CO2 SERPL-SCNC: 24 MMOL/L (ref 21–32)
CO2 SERPL-SCNC: 27 MMOL/L (ref 21–32)
CO2 SERPL-SCNC: 27 MMOL/L (ref 21–32)
COCAINE UR QL SCN: NEGATIVE
COLOR UR: ABNORMAL
COVID-19 RAPID TEST, COVR: DETECTED
CREAT SERPL-MCNC: 0.58 MG/DL (ref 0.55–1.02)
CREAT SERPL-MCNC: 0.68 MG/DL (ref 0.55–1.02)
CREAT SERPL-MCNC: 0.68 MG/DL (ref 0.55–1.02)
D DIMER PPP FEU-MCNC: 1.43 MG/L FEU (ref 0–0.65)
DIAGNOSIS, 93000: NORMAL
DIFFERENTIAL METHOD BLD: ABNORMAL
DRUG SCRN COMMENT,DRGCM: ABNORMAL
EOSINOPHIL # BLD: 0.1 K/UL (ref 0–0.4)
EOSINOPHIL NFR BLD: 3 % (ref 0–7)
EPITH CASTS URNS QL MICRO: ABNORMAL /LPF
ERYTHROCYTE [DISTWIDTH] IN BLOOD BY AUTOMATED COUNT: 13.6 % (ref 11.5–14.5)
FLUAV AG NPH QL IA: NEGATIVE
FLUBV AG NOSE QL IA: NEGATIVE
GLOBULIN SER CALC-MCNC: 4.2 G/DL (ref 2–4)
GLUCOSE SERPL-MCNC: 105 MG/DL (ref 65–100)
GLUCOSE SERPL-MCNC: 107 MG/DL (ref 65–100)
GLUCOSE SERPL-MCNC: 131 MG/DL (ref 65–100)
GLUCOSE UR STRIP.AUTO-MCNC: NEGATIVE MG/DL
HCG SERPL QL: NEGATIVE
HCG UR QL: NEGATIVE
HCT VFR BLD AUTO: 43.4 % (ref 35–47)
HGB BLD-MCNC: 13.7 G/DL (ref 11.5–16)
HGB UR QL STRIP: ABNORMAL
IMM GRANULOCYTES # BLD AUTO: 0 K/UL (ref 0–0.04)
IMM GRANULOCYTES NFR BLD AUTO: 0 % (ref 0–0.5)
KETONES UR QL STRIP.AUTO: NEGATIVE MG/DL
LEUKOCYTE ESTERASE UR QL STRIP.AUTO: ABNORMAL
LYMPHOCYTES # BLD: 1.3 K/UL (ref 0.8–3.5)
LYMPHOCYTES NFR BLD: 32 % (ref 12–49)
MAGNESIUM SERPL-MCNC: 2.7 MG/DL (ref 1.6–2.4)
MCH RBC QN AUTO: 25.8 PG (ref 26–34)
MCHC RBC AUTO-ENTMCNC: 31.6 G/DL (ref 30–36.5)
MCV RBC AUTO: 81.9 FL (ref 80–99)
METHADONE UR QL: NEGATIVE
MONOCYTES # BLD: 0.3 K/UL (ref 0–1)
MONOCYTES NFR BLD: 7 % (ref 5–13)
MUCOUS THREADS URNS QL MICRO: ABNORMAL /LPF
NEUTS SEG # BLD: 2.3 K/UL (ref 1.8–8)
NEUTS SEG NFR BLD: 58 % (ref 32–75)
NITRITE UR QL STRIP.AUTO: NEGATIVE
NRBC # BLD: 0 K/UL (ref 0–0.01)
NRBC BLD-RTO: 0 PER 100 WBC
OPIATES UR QL: NEGATIVE
P-R INTERVAL, ECG05: 146 MS
PCP UR QL: NEGATIVE
PH UR STRIP: 5.5 [PH] (ref 5–8)
PLATELET # BLD AUTO: 384 K/UL (ref 150–400)
PMV BLD AUTO: 9.3 FL (ref 8.9–12.9)
POTASSIUM SERPL-SCNC: 3.9 MMOL/L (ref 3.5–5.1)
POTASSIUM SERPL-SCNC: 4 MMOL/L (ref 3.5–5.1)
POTASSIUM SERPL-SCNC: 4.1 MMOL/L (ref 3.5–5.1)
PROCALCITONIN SERPL-MCNC: <0.05 NG/ML
PROT SERPL-MCNC: 7.7 G/DL (ref 6.4–8.2)
PROT UR STRIP-MCNC: 300 MG/DL
Q-T INTERVAL, ECG07: 356 MS
QRS DURATION, ECG06: 72 MS
QTC CALCULATION (BEZET), ECG08: 449 MS
RBC # BLD AUTO: 5.3 M/UL (ref 3.8–5.2)
RBC #/AREA URNS HPF: ABNORMAL /HPF (ref 0–5)
SODIUM SERPL-SCNC: 136 MMOL/L (ref 136–145)
SODIUM SERPL-SCNC: 137 MMOL/L (ref 136–145)
SODIUM SERPL-SCNC: 139 MMOL/L (ref 136–145)
SOURCE, COVRS: ABNORMAL
SP GR UR REFRACTOMETRY: 1.02 (ref 1–1.03)
UR CULT HOLD, URHOLD: NORMAL
UR CULT HOLD, URHOLD: NORMAL
UROBILINOGEN UR QL STRIP.AUTO: 1 EU/DL (ref 0.2–1)
VENTRICULAR RATE, ECG03: 96 BPM
WBC # BLD AUTO: 4 K/UL (ref 3.6–11)
WBC URNS QL MICRO: ABNORMAL /HPF (ref 0–4)

## 2021-12-26 PROCEDURE — 85025 COMPLETE CBC W/AUTO DIFF WBC: CPT

## 2021-12-26 PROCEDURE — 74011250636 HC RX REV CODE- 250/636: Performed by: HOSPITALIST

## 2021-12-26 PROCEDURE — 74011000250 HC RX REV CODE- 250: Performed by: INTERNAL MEDICINE

## 2021-12-26 PROCEDURE — 82550 ASSAY OF CK (CPK): CPT

## 2021-12-26 PROCEDURE — 87635 SARS-COV-2 COVID-19 AMP PRB: CPT

## 2021-12-26 PROCEDURE — 99283 EMERGENCY DEPT VISIT LOW MDM: CPT

## 2021-12-26 PROCEDURE — 81025 URINE PREGNANCY TEST: CPT

## 2021-12-26 PROCEDURE — 83735 ASSAY OF MAGNESIUM: CPT

## 2021-12-26 PROCEDURE — 65660000000 HC RM CCU STEPDOWN

## 2021-12-26 PROCEDURE — 85379 FIBRIN DEGRADATION QUANT: CPT

## 2021-12-26 PROCEDURE — 84145 PROCALCITONIN (PCT): CPT

## 2021-12-26 PROCEDURE — 87804 INFLUENZA ASSAY W/OPTIC: CPT

## 2021-12-26 PROCEDURE — 93005 ELECTROCARDIOGRAM TRACING: CPT

## 2021-12-26 PROCEDURE — 80307 DRUG TEST PRSMV CHEM ANLYZR: CPT

## 2021-12-26 PROCEDURE — 81001 URINALYSIS AUTO W/SCOPE: CPT

## 2021-12-26 PROCEDURE — 84703 CHORIONIC GONADOTROPIN ASSAY: CPT

## 2021-12-26 PROCEDURE — 80048 BASIC METABOLIC PNL TOTAL CA: CPT

## 2021-12-26 PROCEDURE — 74011250637 HC RX REV CODE- 250/637: Performed by: HOSPITALIST

## 2021-12-26 PROCEDURE — 36415 COLL VENOUS BLD VENIPUNCTURE: CPT

## 2021-12-26 PROCEDURE — 80053 COMPREHEN METABOLIC PANEL: CPT

## 2021-12-26 PROCEDURE — 74011250636 HC RX REV CODE- 250/636: Performed by: PHYSICIAN ASSISTANT

## 2021-12-26 PROCEDURE — 71045 X-RAY EXAM CHEST 1 VIEW: CPT

## 2021-12-26 RX ORDER — SODIUM CHLORIDE 0.9 % (FLUSH) 0.9 %
5-40 SYRINGE (ML) INJECTION AS NEEDED
Status: DISCONTINUED | OUTPATIENT
Start: 2021-12-26 | End: 2021-12-27 | Stop reason: HOSPADM

## 2021-12-26 RX ORDER — ENOXAPARIN SODIUM 100 MG/ML
40 INJECTION SUBCUTANEOUS DAILY
Status: DISCONTINUED | OUTPATIENT
Start: 2021-12-27 | End: 2021-12-27

## 2021-12-26 RX ORDER — ASCORBIC ACID 500 MG
500 TABLET ORAL 2 TIMES DAILY
Status: DISCONTINUED | OUTPATIENT
Start: 2021-12-26 | End: 2021-12-27 | Stop reason: HOSPADM

## 2021-12-26 RX ORDER — ACETAMINOPHEN 650 MG/1
650 SUPPOSITORY RECTAL
Status: DISCONTINUED | OUTPATIENT
Start: 2021-12-26 | End: 2021-12-27 | Stop reason: HOSPADM

## 2021-12-26 RX ORDER — ACETAMINOPHEN 325 MG/1
650 TABLET ORAL
Status: DISCONTINUED | OUTPATIENT
Start: 2021-12-26 | End: 2021-12-27 | Stop reason: HOSPADM

## 2021-12-26 RX ORDER — POLYETHYLENE GLYCOL 3350 17 G/17G
17 POWDER, FOR SOLUTION ORAL DAILY PRN
Status: DISCONTINUED | OUTPATIENT
Start: 2021-12-26 | End: 2021-12-27 | Stop reason: HOSPADM

## 2021-12-26 RX ORDER — SODIUM CHLORIDE 0.9 % (FLUSH) 0.9 %
5-40 SYRINGE (ML) INJECTION EVERY 8 HOURS
Status: DISCONTINUED | OUTPATIENT
Start: 2021-12-26 | End: 2021-12-27 | Stop reason: HOSPADM

## 2021-12-26 RX ORDER — ONDANSETRON 2 MG/ML
4 INJECTION INTRAMUSCULAR; INTRAVENOUS
Status: DISCONTINUED | OUTPATIENT
Start: 2021-12-26 | End: 2021-12-27 | Stop reason: HOSPADM

## 2021-12-26 RX ORDER — PROMETHAZINE HYDROCHLORIDE 25 MG/1
12.5 TABLET ORAL
Status: DISCONTINUED | OUTPATIENT
Start: 2021-12-26 | End: 2021-12-27 | Stop reason: HOSPADM

## 2021-12-26 RX ORDER — ZINC SULFATE 50(220)MG
1 CAPSULE ORAL DAILY
Status: DISCONTINUED | OUTPATIENT
Start: 2021-12-27 | End: 2021-12-27 | Stop reason: HOSPADM

## 2021-12-26 RX ADMIN — SODIUM BICARBONATE: 84 INJECTION, SOLUTION INTRAVENOUS at 17:18

## 2021-12-26 RX ADMIN — ACETAMINOPHEN 650 MG: 325 TABLET ORAL at 19:54

## 2021-12-26 RX ADMIN — SODIUM CHLORIDE 1000 ML: 9 INJECTION, SOLUTION INTRAVENOUS at 15:21

## 2021-12-26 RX ADMIN — Medication 10 ML: at 15:21

## 2021-12-26 RX ADMIN — SODIUM CHLORIDE 1000 ML: 9 INJECTION, SOLUTION INTRAVENOUS at 11:20

## 2021-12-26 NOTE — ED PROVIDER NOTES
27yo female with PMH of rhabdomyolysis with 3 previous admissions as recent as 2019 presents ambulatory for evaluation of \"I think I have rhabdo again. \" She has been c/o myalgias x 4 days, notes nasal congestion, rhinorrhea and fever x 12/23-25 which has since resolved. Myalgias have been present x 5 days. States the past few mornings her urine has looked \"like apple juice\" but urine sample provided at ER arrival with dark soda. She states when her urine begins to turn a color that is when she comes to the hospital.  Per chart review her last admission for rhabdo was 5/2019 where her CK level peaked at 1,685,800 and she underwent a thigh muscle biopsy. Mother and grandfather also had 1 episode of rhambo so during previous admission there was question of a familial component. Last episode was believed by be triggered by hypokalemia and she had just started a weight loss pill, she has not had new meds or acute processes she's aware of but has had fever, chills, URI sx's. Not vaccinated against COVID or flu. No sick contacts at home. LMP- in the last 2 weeks. Past Medical History:   Diagnosis Date    Rhabdomyolysis     Traumatic rhabdomyolysis ([facility] [address] 5/12/2016       No past surgical history on file.       Family History:   Problem Relation Age of Onset    Diabetes Father     Diabetes Maternal Grandmother     Diabetes Maternal Grandfather        Social History     Socioeconomic History    Marital status:      Spouse name: Not on file    Number of children: Not on file    Years of education: Not on file    Highest education level: Not on file   Occupational History    Not on file   Tobacco Use    Smoking status: Current Some Day Smoker    Smokeless tobacco: Never Used   Substance and Sexual Activity    Alcohol use: No     Comment: rare    Drug use: No    Sexual activity: Yes     Partners: Male     Birth control/protection: None   Other Topics Concern    Not on file   Social History Narrative    ** Merged History Encounter **          Social Determinants of Health     Financial Resource Strain:     Difficulty of Paying Living Expenses: Not on file   Food Insecurity:     Worried About Running Out of Food in the Last Year: Not on file    Paula of Food in the Last Year: Not on file   Transportation Needs:     Lack of Transportation (Medical): Not on file    Lack of Transportation (Non-Medical): Not on file   Physical Activity:     Days of Exercise per Week: Not on file    Minutes of Exercise per Session: Not on file   Stress:     Feeling of Stress : Not on file   Social Connections:     Frequency of Communication with Friends and Family: Not on file    Frequency of Social Gatherings with Friends and Family: Not on file    Attends Rastafari Services: Not on file    Active Member of [address] [place] [address] [employer] or Organizations: Not on file    Attends Club or Organization Meetings: Not on file    Marital Status: Not on file   Intimate Partner Violence:     Fear of Current or Ex-Partner: Not on file    Emotionally Abused: Not on file    Physically Abused: Not on file    Sexually Abused: Not on file   Housing Stability:     Unable to Pay for Housing in the Last Year: Not on file    Number of [place] in the Last Year: Not on file    Unstable Housing in the Last Year: Not on file         ALLERGIES: Compazine [prochlorperazine edisylate], Midol [acetaminophen-pamabrom], Aspirin, Compazine [prochlorperazine edisylate], Contrast agent [iodine], Ibuprofen, Motrin [ibuprofen], and Zofran [ondansetron hcl (pf)]    Review of Systems   Constitutional: Positive for chills and fever. Negative for activity change, fatigue and unexpected weight change. HENT: Negative for trouble swallowing. Respiratory: Negative for cough, chest tightness, shortness of breath and wheezing. Cardiovascular: Negative. Negative for chest pain and palpitations. Gastrointestinal: Negative.   Negative for abdominal pain, diarrhea, nausea and vomiting. Genitourinary: Negative. Negative for dysuria, flank pain, frequency and hematuria. Musculoskeletal: Positive for myalgias. Negative for arthralgias, back pain, neck pain and neck stiffness. Skin: Negative. Negative for color change and rash. Neurological: Negative. Negative for dizziness, numbness and headaches. All other systems reviewed and are negative. Vitals:    12/26/21 0943   BP: (!) 130/90   Pulse: 88   Resp: 17   Temp: 97.8 °F (36.6 °C)   SpO2: 97%            Physical Exam  Vitals and nursing note reviewed. Constitutional:       General: She is not in acute distress. Appearance: Normal appearance. She is well-developed. She is not toxic-appearing or diaphoretic. Comments: AA female, elevated BMI   HENT:      Head: Normocephalic and atraumatic. Eyes:      General:         Right eye: No discharge. Left eye: No discharge. Conjunctiva/sclera: Conjunctivae normal.      Pupils: Pupils are equal, round, and reactive to light. Neck:      Trachea: No tracheal tenderness. Cardiovascular:      Rate and Rhythm: Normal rate and regular rhythm. Pulses: Normal pulses. Heart sounds: Normal heart sounds. No murmur heard. No friction rub. No gallop. Pulmonary:      Effort: Pulmonary effort is normal. No respiratory distress. Breath sounds: Normal breath sounds. No wheezing or rales. Chest:      Chest wall: No tenderness. Abdominal:      General: Bowel sounds are normal. There is no distension. Palpations: Abdomen is soft. Tenderness: There is no abdominal tenderness. There is no guarding or rebound. Musculoskeletal:         General: No tenderness. Normal range of motion. Cervical back: Full passive range of motion without pain and normal range of motion. Skin:     General: Skin is warm and dry. Capillary Refill: Capillary refill takes less than 2 seconds. Findings: No abrasion, erythema or rash. Neurological:      Mental Status: She is alert and oriented to person, place, and time. Cranial Nerves: No cranial nerve deficit. Sensory: No sensory deficit. Coordination: Coordination normal.   Psychiatric:         Speech: Speech normal.         Behavior: Behavior normal.          MDM  Number of Diagnoses or Management Options  Diagnosis management comments:   Ddx: rhabdo, UTI, ITZ, dehydration       Amount and/or Complexity of Data Reviewed  Clinical lab tests: ordered and reviewed  Tests in the radiology section of CPT®: ordered and reviewed  Review and summarize past medical records: yes  Discuss the patient with other providers: yes  Independent visualization of images, tracings, or specimens: yes    Patient Progress  Patient progress: stable    ED Course as of 12/26/21 1244   Sun Dec 26, 2021   1026 EKG obtained at 1014 showing sinus rhythm, rate 96, normal intervals, nonspecific T wave inversions in lead III. [JE]      ED Course User Index  [JE] Ashok Vazquez MD       Procedures    Nurse states patient is refusing covid/flu swab. Jovani Roland PA-C       Perfect Serve Consult for Admission  11:43 AM    ED Room Number: R31/R31  Patient Name and age:  Alex Coates 32 y.o.  female  Working Diagnosis:   1. Non-traumatic rhabdomyolysis    2. Acute febrile illness        COVID-19 Suspicion:  yes  Sepsis present:  no  Reassessment needed: N/A  Code Status:  Full Code  Readmission: no  Isolation Requirements:  yes  Recommended Level of Care: remote telemetry? Department:[facility] Adult ED - [phone]  Other: CK level 217,088 . Current sx's began 5 days ago. Hx of rhabdo in the past, familial hx of rhabdo as well. 2L saline ordered. Also having fever, chills, cough. Refusing COVID / flu swab.

## 2021-12-26 NOTE — ED TRIAGE NOTES
Pt arrives ambulatory c/o \"I believe I have rhabdo again\". Pt states she has hx of same 3 years ago. Pt c/o urine discoloration, general muscle aches. Pt denies n/v/d, c/p. Pt is constipated x 3 days. Pt is AOx4 in no apparent distress.

## 2021-12-26 NOTE — CONSULTS
NEPHROLOGY CONSULT NOTE     Patient: Rachel Garnett MRN: [id number]  PCP: None   :     1990  Age:   32 y.o. Sex:  female      Referring physician: Elvira Rizzo MD     Reason for consultation:     Rhabdomyolysis. Ms. Neida Valdovinos was seen in the emergency room at [facility] this afternoon. She was located in [facility]. Her  Mr. Neida Valdovinos was present during my visit. Admission Date: 2021 11:02 AM  LOS: 0 days     DISCUSSION / PLAN :   The clinical picture seems consistent with rhabdomyolysis. The likely precipitant is a viral syndrome. Ms. Neida Valdovinos reported upper respiratory tract symptoms prior to her evaluation this afternoon. There were no new medications. She is not taking any herbal supplements at this time. She is not using nonsteroidal anti-inflammatory drugs. Her GFR is at baseline. Her serum creatinine seems to run from 0.6-0.7. I would not request urine for protein creatinine ratio at this time. 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 I would recommend continuing the course of IV fluids. She is on a bicarbonate-based solution at 150 cc/h. I would increase the rate to 200 cc/h. Her GFR should be monitored on a daily basis. Avoid nephrotoxic drugs. Avoid herbal medications. Active Problems / Assessment AAActive  : Active Problems:    Rhabdomyolysis (2021)         Subjective:           HPI:       Ms. Neida Valdovinos is a 24-year-old -American lady who is known to our service. She was seen by her team during her admission to [facility] in 2019. She had a similar presentation at that time. She was diagnosed with rhabdomyolysis. She was on a weight loss supplement called thermalfight X. She also had acute kidney injury. Her serum creatinine peaked at 1.46 on 2019. The CPK level peaked at 1,685,800. Thankfully she responded to hydration. The work-up was unremarkable.   She underwent a muscle biopsy during that admission. It revealed necrotic muscle. Ms. Donovan reported upper respiratory tract symptoms over the last few days. There was a history of nasal congestion, sore throat and fever. There was no history of headache. There is no history of nausea vomiting or diarrhea. She reported myalgia and arthralgia. The arms and thighs seem to have been affected. On the day prior to presentation, she noticed a change in the color of the urine. It was said to be dark/Pepsi colored on the morning of presentation. The ER evaluation at [facility] revealed a CPK level of 217,088. Her creatinine was stable at 0.68. She was placed on a bicarbonate based solution. We were asked to assist with her evaluation management. Past Medical Hx:   Past Medical History:   Diagnosis Date    Rhabdomyolysis     Traumatic rhabdomyolysis ([address] 5/12/2016        Past Surgical Hx:   No past surgical history on file. Medications:  Prior to Admission medications    Medication Sig Start Date End Date Taking? Authorizing Provider   cholecalciferol, VITAMIN D3, (VITAMIN D3) 5,000 unit tab tablet Take 1 Tab by mouth daily. 4/2/19   Garcia Gilliam MD   ondansetron (ZOFRAN ODT) 4 mg disintegrating tablet Take 1 Tab by mouth every six (6) hours as needed for Nausea. 3/18/19   Russell Calderón NP   magnesium oxide (MAG-OX) 400 mg tablet Take 1 Tab by mouth daily. 3/18/19   Russell Calderón NP   potassium chloride (KLOR-CON) 20 mEq pack Take 2 Packets by mouth daily. 3/18/19   Russell Calderón NP   [place], Please obtain BMP/Magnesium. Please call or fax results to number below.    Pt will be seeing Dr. Efrem Lim on 4/2/2019    Dr. Efrem Lim  [phone] 3/18/19   Russell Calderón NP       Allergies   Allergen Reactions    Compazine [Prochlorperazine Edisylate] Anaphylaxis     stroke    Midol [Acetaminophen-Pamabrom] Anaphylaxis     dont take again because of rabdo    Aspirin Other (comments)     Never take again because of rabdo    Compazine [Prochlorperazine Edisylate] Other (comments)     Stroke symptoms    Contrast Agent [Iodine] Other (comments)     Kidney problems    Ibuprofen Other (comments)     Kidney problem,rhabdo      Motrin [Ibuprofen] Nausea and Vomiting    Zofran [Ondansetron Hcl (Pf)] Nausea and Vomiting       Social Hx:       Ms. Yamil Flowers is . She smokes cigarettes in the past.    Family History   Problem Relation Age of Onset    Diabetes Father     Diabetes Maternal Grandmother     Diabetes Maternal Grandfather      Her dad and mom were both diagnosed with rhabdomyolysis. Review of Systems:      See history of presenting complaint. Objective:    Vitals:    Vitals:    12/26/21 0943   BP: (!) 130/90   Pulse: 88   Resp: 17   Temp: 97.8 °F (36.6 °C)   SpO2: 97%     I&O's:  No intake/output data recorded. Visit Vitals  BP (!) 130/90 (BP 1 Location: Right upper arm, BP Patient Position: At rest;Sitting)   Pulse 88   Temp 97.8 °F (36.6 °C)   Resp 17   SpO2 97%       Physical Exam:    Ms. Yamil Flowers was seen in R [facility] this afternoon. Mr. Yamil Flowers was present during my evaluation. Generalshe was sitting up quite comfortably at the edge of the bed. Headnormocephalic. Moist mucous membranes. CardiovascularS1, S2, no S3 gallop rhythm, no pericardial rub. Respiratorybreath sounds were vesicular, no wheezes, no rales, no rhonchi,    Abdomensoft nontender    Backno paraspinal muscle tenderness    Upper extremitiesmild tenderness in both arms    Lower extremitiesmild tenderness in the thighs. Neuroalert and answering all questions appropriately. Psychappropriate affect.         Laboratory Results:    Lab Results   Component Value Date    BUN 8 12/26/2021     12/26/2021    K 4.0 12/26/2021     12/26/2021    CO2 27 12/26/2021       Lab Results   Component Value Date    BUN 8 12/26/2021    BUN 8 12/26/2021    BUN 17 03/22/2019    BUN 21 (H) 03/18/2019    BUN 22 (H) 03/17/2019    K 4.0 12/26/2021    K 3.9 12/26/2021    K 3.6 03/22/2019    K 3.2 (L) 03/18/2019    K 3.2 (L) 03/17/2019       Lab Results   Component Value Date    WBC 4.0 12/26/2021    RBC 5.30 (H) 12/26/2021    HGB 13.7 12/26/2021    HCT 43.4 12/26/2021    MCV 81.9 12/26/2021    MCH 25.8 (L) 12/26/2021    RDW 13.6 12/26/2021     12/26/2021       Lab Results   Component Value Date    PHOS 2.1 (L) 03/18/2019       Urine dipstick:   Lab Results   Component Value Date/Time    Color RED 12/26/2021 10:09 AM    Appearance CLOUDY (A) 12/26/2021 10:09 AM    Specific gravity 1.025 12/26/2021 10:09 AM    Specific gravity 1.022 03/07/2019 05:09 AM    pH (UA) 5.5 12/26/2021 10:09 AM    Protein 300 (A) 12/26/2021 10:09 AM    Glucose Negative 12/26/2021 10:09 AM    Ketone Negative 12/26/2021 10:09 AM    Bilirubin NEGATIVE  05/26/2016 07:42 PM    Urobilinogen 1.0 12/26/2021 10:09 AM    Nitrites Negative 12/26/2021 10:09 AM    Leukocyte Esterase SMALL (A) 12/26/2021 10:09 AM    Epithelial cells MODERATE (A) 12/26/2021 10:09 AM    Bacteria 2+ (A) 12/26/2021 10:09 AM    WBC 0-4 12/26/2021 10:09 AM    RBC 0-5 12/26/2021 10:09 AM       I have reviewed the following:           Care Plan discussed with: Mr and Mrs Yamil Flowers. Chart reviewed. Thank you for allowing us to participate in the care of this patient. We will follow patient. Please dont hesitate to call with any questions    Lynn Sandoval MD  12/26/2021    [place] [address]  Phone - [phone]   Fax - [phone]  www. [facility]. [facility]

## 2021-12-26 NOTE — ED NOTES
Pt agreeable to rapid covid and flu swab at this time. Both swabs sent off. Meal tray ordered for pt at this time. 2nd liter currently infusing.

## 2021-12-27 VITALS
RESPIRATION RATE: 18 BRPM | WEIGHT: 242 LBS | OXYGEN SATURATION: 99 % | HEART RATE: 97 BPM | DIASTOLIC BLOOD PRESSURE: 89 MMHG | TEMPERATURE: 98 F | HEIGHT: 64 IN | BODY MASS INDEX: 41.32 KG/M2 | SYSTOLIC BLOOD PRESSURE: 124 MMHG

## 2021-12-27 LAB
ALBUMIN SERPL-MCNC: 3 G/DL (ref 3.5–5)
ALBUMIN/GLOB SERPL: 0.9 {RATIO} (ref 1.1–2.2)
ALP SERPL-CCNC: 51 U/L (ref 45–117)
ALT SERPL-CCNC: 167 U/L (ref 12–78)
ANION GAP SERPL CALC-SCNC: 4 MMOL/L (ref 5–15)
AST SERPL-CCNC: 612 U/L (ref 15–37)
BASOPHILS # BLD: 0 K/UL (ref 0–0.1)
BASOPHILS NFR BLD: 0 % (ref 0–1)
BILIRUB SERPL-MCNC: 0.3 MG/DL (ref 0.2–1)
BUN SERPL-MCNC: 7 MG/DL (ref 6–20)
BUN/CREAT SERPL: 15 (ref 12–20)
CALCIUM SERPL-MCNC: 7.5 MG/DL (ref 8.5–10.1)
CHLORIDE SERPL-SCNC: 109 MMOL/L (ref 97–108)
CK SERPL-CCNC: ABNORMAL U/L (ref 26–192)
CO2 SERPL-SCNC: 25 MMOL/L (ref 21–32)
COMMENT, HOLDF: NORMAL
CREAT SERPL-MCNC: 0.46 MG/DL (ref 0.55–1.02)
D DIMER PPP FEU-MCNC: 1.6 MG/L FEU (ref 0–0.65)
DIFFERENTIAL METHOD BLD: ABNORMAL
EOSINOPHIL # BLD: 0.1 K/UL (ref 0–0.4)
EOSINOPHIL NFR BLD: 3 % (ref 0–7)
ERYTHROCYTE [DISTWIDTH] IN BLOOD BY AUTOMATED COUNT: 14 % (ref 11.5–14.5)
GLOBULIN SER CALC-MCNC: 3.4 G/DL (ref 2–4)
GLUCOSE SERPL-MCNC: 116 MG/DL (ref 65–100)
HCT VFR BLD AUTO: 35.8 % (ref 35–47)
HGB BLD-MCNC: 11.1 G/DL (ref 11.5–16)
IMM GRANULOCYTES # BLD AUTO: 0 K/UL (ref 0–0.04)
IMM GRANULOCYTES NFR BLD AUTO: 0 % (ref 0–0.5)
INR PPP: 1 (ref 0.9–1.1)
LYMPHOCYTES # BLD: 1.3 K/UL (ref 0.8–3.5)
LYMPHOCYTES NFR BLD: 32 % (ref 12–49)
MCH RBC QN AUTO: 25.9 PG (ref 26–34)
MCHC RBC AUTO-ENTMCNC: 31 G/DL (ref 30–36.5)
MCV RBC AUTO: 83.4 FL (ref 80–99)
MONOCYTES # BLD: 0.3 K/UL (ref 0–1)
MONOCYTES NFR BLD: 8 % (ref 5–13)
NEUTS SEG # BLD: 2.2 K/UL (ref 1.8–8)
NEUTS SEG NFR BLD: 56 % (ref 32–75)
NRBC # BLD: 0 K/UL (ref 0–0.01)
NRBC BLD-RTO: 0 PER 100 WBC
PLATELET # BLD AUTO: 247 K/UL (ref 150–400)
PMV BLD AUTO: 9.6 FL (ref 8.9–12.9)
POTASSIUM SERPL-SCNC: 3.8 MMOL/L (ref 3.5–5.1)
PROT SERPL-MCNC: 6.4 G/DL (ref 6.4–8.2)
PROTHROMBIN TIME: 10 SEC (ref 9–11.1)
RBC # BLD AUTO: 4.29 M/UL (ref 3.8–5.2)
SAMPLES BEING HELD,HOLD: NORMAL
SODIUM SERPL-SCNC: 138 MMOL/L (ref 136–145)
WBC # BLD AUTO: 3.9 K/UL (ref 3.6–11)

## 2021-12-27 PROCEDURE — 85610 PROTHROMBIN TIME: CPT

## 2021-12-27 PROCEDURE — 80053 COMPREHEN METABOLIC PANEL: CPT

## 2021-12-27 PROCEDURE — 82550 ASSAY OF CK (CPK): CPT

## 2021-12-27 PROCEDURE — 36415 COLL VENOUS BLD VENIPUNCTURE: CPT

## 2021-12-27 PROCEDURE — 85025 COMPLETE CBC W/AUTO DIFF WBC: CPT

## 2021-12-27 PROCEDURE — 85379 FIBRIN DEGRADATION QUANT: CPT

## 2021-12-27 RX ORDER — ENOXAPARIN SODIUM 100 MG/ML
40 INJECTION SUBCUTANEOUS EVERY 12 HOURS
Status: DISCONTINUED | OUTPATIENT
Start: 2021-12-27 | End: 2021-12-27 | Stop reason: HOSPADM

## 2021-12-27 NOTE — ADT AUTH CERT NOTES
[address]     FACILITY NPI :[id number]  FACILITY TAX ID :      [address]  [facility] EMERGENCY [place] [id number] [phone]          DEPT CONTACT:  Swati TERESA#[phone]  [facility]#[phone]       Patient Name :Fang Cavazos   : 1990 (31 yrs)  MRN : [id number]     Patient Mailing Address [id number] [facility] [] , [facility]                  Insurance Plan Payor: Dhaval Richardson / Plan: [address] [place] [address] / Product Type: PPO /      Primary Coverage Subscriber ID : [id number]     Secondary Coverage:  N/A         Current Patient Class : INPATIENT  Admit Date : 2021     REQUESTED LEVEL OF CARE: INPATIENT [101]                                                           Diagnosis : Rhabdomyolysis                          ICD10 Code : Rhabdomyolysis [M62.82]     Current Room and Bed R31/R31     Admitting and Attending Info:  Admitting Provider : Ke Wright MD   NPI: [id number]  Admitting Provider Phone.  [phone]  Admitting Provider Address: [address] [place] [address]         Utilization Reviews         PA recommendation by Jabier Ellington RN       Review Entered Review Status   2021 11:27 In Primary      Criteria Review   We recommend that the following pt's hospitalization under INPATIENT [101] status is APPROPRIATE       Name: Fang Cavazos   : 1990   GURU# : [id number]  Insurance: Makenna Chiu     Clinical summary 32years old female with a history of rhabdomyolysis presenting with muscle aches CPk 217,088 , covid-19-positive not hypoxic         Vitals stable  Labs and Imaging   MCG criteria applies yes  Comments       This chart was reviewed at 11:13 AM 2021    [address] [facility]   [phone]        Musculoskeletal Disease [address] Day 1 (2021) by Jabier Ellington RN       Review Entered Review Status   12/27/2021 10:34 Completed      Criteria Review      Care Day: 1 Care Date: 12/26/2021 Level of Care: Telemetry    Guideline Day 1    Level Of Care    (X) ICU or intermediate care    12/27/2021 10:34:03 EST by Tashia Cueto      ip telemetry    Clinical Status    ( ) * Clinical Indications met    Interventions    ( ) Inpatient interventions as needed    12/27/2021 10:34:03 EST by Tashia Cueto      iv fluids    * Milestone   Additional Notes   12/26/2021   LOC IP TELEMETRY   VS 97.8 88 130/90 16 97% ROOM AIR   LABS     Appearance: CLOUDY (A)   Specific gravity: 1.025   pH (UA): 5.5   Protein: 300 (A)   Glucose: Negative   Ketone: Negative   Blood: LARGE (A)   Bilirubin UA, confirm: Negative   Urobilinogen: 1.0   Nitrites: Negative   Leukocyte Esterase: SMALL (A)   Epithelial cells: MODERATE (A)   Mucus: TRACE (A)   WBC: 0-4   RBC: 0-5   Bacteria: 2+ (A)   Sodium: 136   Potassium: 3.9   Chloride: 104   CO2: 27   Anion gap: 5   Glucose: 131 (H)   BUN: 8   Creatinine: 0.68   BUN/Creatinine ratio: 12   Calcium: 8.5   Magnesium: 2.7 (H)   GFR est non-AA: >60   GFR est AA: >60   Bilirubin, total: 0.2   Protein, total: 7.7   Albumin: 3.5   Globulin: 4.2 (H)   A-G Ratio: 0.8 (L)   ALT: 182 (H)   AST: 797 (H)   Alk.  phosphatase: 61   CK: 217,088 (HH)         12/26/2021 13:44   CK: 170,820 (HH)      COVID-19 rapid test NOTD   Detected Panic        EKG Normal sinus rhythm    Normal ECG      CXR No acute process identified.           MEDS   0.45%NS with sodium bicarb @ 200 cc/hr   Tylenol 650mg po q6 prn x1   NS bolus 1000ml iv x2   H&P   This is a 77-year-old [facility] American female with a past medical history of rhabdomyolysis of unknown origin.  She comes over here because she has been feeling weak and she thinks that she has another episode of rhabdomyolysis.  On asking specifically, she mentions that since the last 4 days, she has been having 'flu-like symptoms\" which she specifies that she has been having nasal congestion, cough, but no nausea, vomiting, headache, dizziness.  No changes in bowel movements.  She has been feeling muscle aches and weakness.  In the ER, the patient had a CK done which was found to be at 217,088.  At that time, the patient was given to the hospitalist team for further evaluation and management.  The patient says that she has not been vaccinated for COVID-19 virus.  Initially, reportedly she refused to get the testing done for COVID but then she agreed and was found to be COVID-19 positive.       The patient has had two previous admissions with the last one in 2019 for rhabdomyolysis.  At that time, the patient had a muscle biopsy which was equivocal.  The patient was supposed to see a , but says that she was never informed to see a . Dorcas Rizzo has a couple of other members including her mother and grandfather who have had at least one episode of rhabdomyolysis.  Last admission when the patient was admitted, she was taking an over-the-counter medication, which she is not taking anymore. GENERAL:  Not in acute distress.  Comfortably lying in bed. HEENT:  Head:  Normocephalic, atraumatic.  Eyes:  PERRLA.  EOMI.  Ears:  Bilaterally hearing normal.  No growth.  Nose:  No polyps, no bleeding.  Mouth:  Dry mucous membranes.  Decent oral hygiene. NECK:  Supple.  No JVD.  No thyromegaly. RESPIRATORY:  Clear to auscultation bilaterally.  No adventitious breath sounds. CARDIOVASCULAR:  S1, S2 normal.  No murmurs, rubs, or gallops. GI:  Bowel sounds present.  Soft, nondistended.  Mildly tender in the right upper quadrant, but no rebound, no guarding.    NEUROLOGIC:  Cranial nerves II through XII are intact.  Motor 5/5 bilaterally in upper limbs and lower limbs.  Sensory normal.   PSYCHIATRIC:  Mood and affect appropriate.  Alert, awake, and oriented x3.       LABORATORY AND RADIOLOGICAL RESULTS:  WBC 4.0, hemoglobin 13.7, hematocrit ASSESSMENT AND PLAN:  This is a 61-year-old [facility] American female with a past medical history of rhabdomyolysis.  She comes over here because of recent nasal congestion, cough, and muscle aches and she was found to have COVID-19 positive and elevated CK suggestive of rhabdomyolysis. 1.  COVID-19 positive.  Currently, the patient seems quite stable respiratory wise.  She is not requiring any supplemental oxygen.  We will put the patient on vitamin C and zinc.  She does not qualify for steroids or remdesivir at this time. Conchita Harrison will monitor the patient closely. Conchita Harrison will put the patient on droplet precautions. 2.  Rhabdomyolysis, likely secondary to above.  We will put the patient on IV fluids.  I have already spoken to the nephrologist, greatly appreciate his recommendations.  In light of COVID, we need to be very cautious about fluid overload.  We will monitor the patient very closely. 3.  Urine drug screen positive of THC.  We will  the patient once more stable. 4.  Transaminitis, likely secondary to rhabdomyolysis.  Monitor closely. NEPHROLOGY CONSULT   DISCUSSION / PLAN :    The clinical picture seems consistent with rhabdomyolysis.  The likely precipitant is a viral syndrome.  Ms. [place] Lion[place] reported upper respiratory tract symptoms prior to her evaluation this afternoon.  There were no new medications. Estela Satno is not taking any herbal supplements at this time. Estela Santo is not using nonsteroidal anti-inflammatory drugs. Her GFR is at baseline. José Murciamayito serum creatinine seems to run from 0.6-0.7. I would not request urine for protein creatinine ratio at this time.  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 I would recommend continuing the course of IV fluids.  She is on a bicarbonate-based solution at 150 cc/h.  I would increase the rate to 200 cc/h. Her GFR should be monitored on a daily basis. Avoid nephrotoxic drugs.    Avoid herbal medications.           Active Problems / Assessment AAActive  :    Active Problems:     Rhabdomyolysis (12/26/2021)               Subjective:                   HPI:            Ms. [address] is a 40-year-old -American lady who is known to our service. Raymond Elizabeth was seen by her team during her admission to [facility] in March 2019. She had a similar presentation at that time. Raymond Elizabeth was diagnosed with rhabdomyolysis.  She was on a weight loss supplement called thermal-fight X.  She also had acute kidney injury.  Her serum creatinine peaked at 1.46 on March 5, 2019.  The CPK level peaked at 2,855,782. Leila Randall she responded to hydration.  The work-up was unremarkable.  She underwent a muscle biopsy during that admission.  It revealed necrotic muscle.       Ms. Maria Elena Jaquez reported upper respiratory tract symptoms over the last few days. Juris Monica was a history of nasal congestion, sore throat and fever. Juris Monica was no history of headache. [facility] is no history of nausea vomiting or diarrhea.  She reported myalgia and arthralgia.  The arms and thighs seem to have been affected.  On the day prior to presentation, she noticed a change in the color of the urine.  It was said to be dark/Pepsi colored on the morning of presentation.       The ER evaluation at [facility] revealed a CPK level of 217,088.  Her creatinine was stable at 0.68.  She was placed on a bicarbonate based solution.          ED COURSE   27yo female with PMH of rhabdomyolysis with 3 previous admissions as recent as 2019 presents ambulatory for evaluation of \"I think I have rhabdo again. \" She has been c/o myalgias x 4 days, notes nasal congestion, rhinorrhea and fever x 12/23-25 which has since resolved.  Myalgias have been present x 5 days.  States the past few mornings her urine has looked \"like apple juice\" but urine sample provided at ER arrival with dark soda.  She states when her urine begins to turn a color that is when she comes to the hospital.  Per chart review her last admission for rhabdo was 2019 where her CK level peaked at 1,685,800 and she underwent a thigh muscle biopsy. Mother and grandfather also had 1 episode of rhambo so during previous admission there was question of a familial component. Last episode was believed by be triggered by hypokalemia and she had just started a weight loss pill, she has not had new meds or acute processes she's aware of but has had fever, chills, URI sx's. Not vaccinated against COVID or flu. No sick contacts at home. Working Diagnosis:    1. Non-traumatic rhabdomyolysis    2. Acute febrile illness                  Musculoskeletal Disease GRG - Clinical Indications for Admission to Inpatient Care by Deepa Grajeda RN       Review Entered Review Status   2021 10:33 Completed      Criteria Review      Clinical Indications for Admission to Inpatient Care    Most Recent : Payam Slater Most Recent Date: 2021 10:33:30 EST       H&P Notes       H&P filed by Jayce Jeronimo MD at 21 / Draft: Not Electronically Signed / documented on ED from 2021 in [facility] EMERGENCY [address]    Author: Jayce Jeronimo MD Author Type: Physician Filed: 21   Completion Status:  In Progress Availability: Unavailable Document ID: [id number]   Note Status: Unsigned Transcription Date of Service: 21     : Jayce Jeronimo MD (Physician)          Transcription Details: H&P   Dictated By: Jayce Jeronimo MD Dictated Date: Not found Dictated Time: Not found   Transcribed By: Not found Transcribed Date: 21 Transcribed Time: [address]     Name:  Tonny Bergeron  MR#:  [id number]  :  1990  ACCOUNT #:  [de-identified]  ADMIT DATE:  2021        ADMITTING ATTENDING:  Padmini Alvarez MD     CHIEF COMPLAINT:  Generalized weakness.     HISTORY OF PRESENT ILLNESS:  This is a 72-year-old  female with a past medical history of rhabdomyolysis of unknown origin. She comes over here because she has been feeling weak and she thinks that she has another episode of rhabdomyolysis. On asking specifically, she mentions that since the last 4 days, she has been having 'flu-like symptoms\" which she specifies that she has been having nasal congestion, cough, but no nausea, vomiting, headache, dizziness. No changes in bowel movements. She has been feeling muscle aches and weakness. In the ER, the patient had a CK done which was found to be at 217,088. At that time, the patient was given to the hospitalist team for further evaluation and management. The patient says that she has not been vaccinated for COVID-19 virus. Initially, reportedly she refused to get the testing done for COVID but then she agreed and was found to be COVID-19 positive.     The patient has had two previous admissions with the last one in 2019 for rhabdomyolysis. At that time, the patient had a muscle biopsy which was equivocal.  The patient was supposed to see a , but says that she was never informed to see a .  She has a couple of other members including her mother and grandfather who have had at least one episode of rhabdomyolysis. Last admission when the patient was admitted, she was taking an over-the-counter medication, which she is not taking anymore.     REVIEW OF SYSTEMS:  Pertinent positives as above. Rest of review of systems were done. They were all negative except for above.     PAST MEDICAL HISTORY:  Rhabdomyolysis.     PAST SURGICAL HISTORY:  None.     FAMILY HISTORY:  Significant for diabetes, rhabdomyolysis.     SOCIAL HISTORY:  Nonsmoker. Alcohol, drinks socially.   Non-drug abuser.     ALLERGIES:  THE PATIENT IS REPORTEDLY ALLERGIC TO COMPAZINE, MIDOL, ASPIRIN, CONTRAST AGENT, IBUPROFEN, MOTRIN, AND ZOFRAN.     PHYSICAL EXAMINATION:  VITAL SIGNS:  At the time when the patient was seen, the patient's vitals were as follows:  Blood pressure 150/107, pulse 90, afebrile, respiratory rate 14, saturating 100% on room air. GENERAL:  Not in acute distress. Comfortably lying in bed. HEENT:  Head:  Normocephalic, atraumatic. Eyes:  PERRLA. EOMI. Ears:  Bilaterally hearing normal.  No growth. Nose:  No polyps, no bleeding. Mouth:  Dry mucous membranes. Decent oral hygiene. NECK:  Supple. No JVD. No thyromegaly. RESPIRATORY:  Clear to auscultation bilaterally. No adventitious breath sounds. CARDIOVASCULAR:  S1, S2 normal.  No murmurs, rubs, or gallops. GI:  Bowel sounds present. Soft, nondistended. Mildly tender in the right upper quadrant, but no rebound, no guarding. NEUROLOGIC:  Cranial nerves II through XII are intact. Motor 5/5 bilaterally in upper limbs and lower limbs. Sensory normal.  PSYCHIATRIC:  Mood and affect appropriate. Alert, awake, and oriented x3.     LABORATORY AND RADIOLOGICAL RESULTS:  WBC 4.0, hemoglobin 13.7, hematocrit 43, and platelet count of 770. Urinalysis shows small leukocyte esterase, 0-4 wbc's, and 2+ bacteria. Sodium 136, potassium 3.9, chloride 104, bicarbonate 27, BUN 8, creatinine 0.68. ALT of 182 and AST of 797. Urine drug screen is positive of THC. Rapid COVID is positive. Pregnancy test is negative.     ASSESSMENT AND PLAN:  This is a 66-year-old [facility] American female with a past medical history of rhabdomyolysis. She comes over here because of recent nasal congestion, cough, and muscle aches and she was found to have COVID-19 positive and elevated CK suggestive of rhabdomyolysis. 1.  COVID-19 positive. Currently, the patient seems quite stable respiratory wise. She is not requiring any supplemental oxygen. We will put the patient on vitamin C and zinc.  She does not qualify for steroids or remdesivir at this time. We will monitor the patient closely. We will put the patient on droplet precautions. 2.  Rhabdomyolysis, likely secondary to above.   We will put the patient on IV fluids. I have already spoken to the nephrologist, greatly appreciate his recommendations. In light of COVID, we need to be very cautious about fluid overload. We will monitor the patient very closely. 3.  Urine drug screen positive of THC. We will  the patient once more stable. 4.  Transaminitis, likely secondary to rhabdomyolysis.   Monitor closely.     I spent about 50 minutes in taking care of the patient.  Umu Lim MD        [id number]/  D:  12/26/2021 18:30  T:  12/26/2021 19:28  JOB #:  [id number]

## 2021-12-27 NOTE — ED NOTES
Pt resting comfortably throughout shift. Pt states she feels well and was surprised that her CK was elevated. Continuous fluids running as ordered. Will continue to monitor.  Pt awaiting bed assignment in hospital.

## 2021-12-27 NOTE — H&P
[facility]  HISTORY AND PHYSICAL    Name:  Kaylee Collado  MR#:  [id number]  :  1990  ACCOUNT #:  [de-identified]  ADMIT DATE:  2021      ADMITTING ATTENDING:  Sylvie Pepper MD    CHIEF COMPLAINT:  Generalized weakness. HISTORY OF PRESENT ILLNESS:  This is a 19-year-old [facility] American female with a past medical history of rhabdomyolysis of unknown origin. She comes over here because she has been feeling weak and she thinks that she has another episode of rhabdomyolysis. On asking specifically, she mentions that since the last 4 days, she has been having 'flu-like symptoms\" which she specifies that she has been having nasal congestion, cough, but no nausea, vomiting, headache, dizziness. No changes in bowel movements. She has been feeling muscle aches and weakness. In the ER, the patient had a CK done which was found to be at 217,088. At that time, the patient was given to the hospitalist team for further evaluation and management. The patient says that she has not been vaccinated for COVID-19 virus. Initially, reportedly she refused to get the testing done for COVID but then she agreed and was found to be COVID-19 positive. The patient has had two previous admissions with the last one in 2019 for rhabdomyolysis. At that time, the patient had a muscle biopsy which was equivocal.  The patient was supposed to see a , but says that she was never informed to see a .  She has a couple of other members including her mother and grandfather who have had at least one episode of rhabdomyolysis. Last admission when the patient was admitted, she was taking an over-the-counter medication, which she is not taking anymore. REVIEW OF SYSTEMS:  Pertinent positives as above. Rest of review of systems were done. They were all negative except for above. PAST MEDICAL HISTORY:  Rhabdomyolysis. PAST SURGICAL HISTORY:  None.     FAMILY HISTORY:  Significant for diabetes, rhabdomyolysis. SOCIAL HISTORY:  Nonsmoker. Alcohol, drinks socially. Non-drug abuser. ALLERGIES:  THE PATIENT IS REPORTEDLY ALLERGIC TO COMPAZINE, MIDOL, ASPIRIN, CONTRAST AGENT, IBUPROFEN, MOTRIN, AND ZOFRAN. PHYSICAL EXAMINATION:  VITAL SIGNS:  At the time when the patient was seen, the patient's vitals were as follows:  Blood pressure 150/107, pulse 90, afebrile, respiratory rate 14, saturating 100% on room air. GENERAL:  Not in acute distress. Comfortably lying in bed. HEENT:  Head:  Normocephalic, atraumatic. Eyes:  PERRLA. EOMI. Ears:  Bilaterally hearing normal.  No growth. Nose:  No polyps, no bleeding. Mouth:  Dry mucous membranes. Decent oral hygiene. NECK:  Supple. No JVD. No thyromegaly. RESPIRATORY:  Clear to auscultation bilaterally. No adventitious breath sounds. CARDIOVASCULAR:  S1, S2 normal.  No murmurs, rubs, or gallops. GI:  Bowel sounds present. Soft, nondistended. Mildly tender in the right upper quadrant, but no rebound, no guarding. NEUROLOGIC:  Cranial nerves II through XII are intact. Motor 5/5 bilaterally in upper limbs and lower limbs. Sensory normal.  PSYCHIATRIC:  Mood and affect appropriate. Alert, awake, and oriented x3. LABORATORY AND RADIOLOGICAL RESULTS:  WBC 4.0, hemoglobin 13.7, hematocrit 43, and platelet count of 975. Urinalysis shows small leukocyte esterase, 0-4 wbc's, and 2+ bacteria. Sodium 136, potassium 3.9, chloride 104, bicarbonate 27, BUN 8, creatinine 0.68. ALT of 182 and AST of 797. Urine drug screen is positive of THC. Rapid COVID is positive. Pregnancy test is negative. ASSESSMENT AND PLAN:  This is a 59-year-old [facility] American female with a past medical history of rhabdomyolysis. She comes over here because of recent nasal congestion, cough, and muscle aches and she was found to have COVID-19 positive and elevated CK suggestive of rhabdomyolysis. 1.  COVID-19 positive.   Currently, the patient seems quite stable respiratory wise. She is not requiring any supplemental oxygen. We will put the patient on vitamin C and zinc.  She does not qualify for steroids or remdesivir at this time. We will monitor the patient closely. We will put the patient on droplet precautions. 2.  Rhabdomyolysis, likely secondary to above. We will put the patient on IV fluids. I have already spoken to the nephrologist, greatly appreciate his recommendations. In light of COVID, we need to be very cautious about fluid overload. We will monitor the patient very closely. 3.  Urine drug screen positive of THC. We will  the patient once more stable. 4.  Transaminitis, likely secondary to rhabdomyolysis. Monitor closely. I spent about 50 minutes in taking care of the patient.       Ray Zepeda MD      PG/[id number]/  D:  12/26/2021 18:30  T:  12/26/2021 19:28  JOB #:  [id number]

## 2021-12-28 ENCOUNTER — PATIENT OUTREACH (OUTPATIENT)
Dept: CASE MANAGEMENT | Age: 31
End: 2021-12-28

## 2021-12-28 NOTE — PROGRESS NOTES
Patient contacted regarding COVID-19 diagnosis. Discussed COVID-19 related testing which was available at this time. Test results were positive. Patient informed of results, if available? yes. Care Transition Nurse contacted the patient by telephone to perform post discharge assessment. Call within 2 business days of discharge: Yes Verified name and  with patient as identifiers. Provided introduction to self, and explanation of the CTN/ACM role, and reason for call due to risk factors for infection and/or exposure to COVID-19. Symptoms reviewed with patient who verbalized the following symptoms: fatigue, cough, no new symptoms and no worsening symptoms      Due to no new or worsening symptoms encounter was not routed to provider for escalation. Discussed follow-up appointments. If no appointment was previously scheduled, appointment scheduling offered:  Patient will schedule own appt. [facility]  follow up appointment(s): No future appointments. Non-[facility] follow up appointment(s): pcp tbd    Interventions to address risk factors: Obtained and reviewed discharge summary and/or continuity of care documents     Advance Care Planning:   Does patient have an Advance Directive: not on file education deferred to future outreach    Educated patient about risk for severe COVID-19 due to risk factors according to CDC guidelines. CTN reviewed discharge instructions, medical action plan and red flag symptoms with the patient who verbalized understanding. Discussed COVID vaccination status: no. Education provided on COVID-19 vaccination as appropriate. Discussed exposure protocols and quarantine with CDC Guidelines. Patient was given an opportunity to verbalize any questions and concerns and agrees to contact CTN or health care provider for questions related to their healthcare.     Reviewed and educated patient on any new and changed medications related to discharge diagnosis     Was patient discharged with a pulse oximeter? no Discussed and confirmed pulse oximeter discharge instructions and when to notify provider or seek emergency care. CTN provided contact information. Plan for follow-up call in 5-7 days based on severity of symptoms and risk factors.

## 2021-12-29 NOTE — PROGRESS NOTES
Physician Progress Note      PATIENT:               Crispin Colon  CSN #:                  [id number]  :                       1990  ADMIT DATE:       2021 11:02 AM  Micaela Linares DATE:        2021 9:41 AM  RESPONDING  PROVIDER #:        Trupti Wilcox MD          QUERY TEXT:    Pt admitted with generalized weakness. Pt noted to have documented rhabdomyolysis. If possible, please document in progress notes and discharge summary if you are evaluating and/or treating any of the following: The medical record reflects the following:  Risk Factors:  40-year-old [place] American female with a past medical history of rhabdomyolysis of unknown origin. She comes over here because she has been feeling weak and she thinks that she has another episode  Clinical Indicators: generalized weakness, ck level 264,103, 'flu-like symptoms, muscle aches, has had two previous admissions with the last one in 2019 for rhabdomyolysis, pt is COVID +  Treatment: IVF bolus 2L, Sodium Bicarb,  vitamin C and zinc,    Per [employer]  Traumatic rhabdomyolysis cause examples: crush syndrome, prolonged immobilization  Nontraumatic rhabdomyolysis cause examples:  marked exertion, hyperthermia, metabolic myopathy, drugs or toxins, infections, electrolyte disorders. Options provided:  -- Traumatic rhabdomyolysis  -- Nontraumatic rhabdomyolysis  -- Other - I will add my own diagnosis  -- Disagree - Not applicable / Not valid  -- Disagree - Clinically unable to determine / Unknown  -- Refer to Clinical Documentation Reviewer    PROVIDER RESPONSE TEXT:    This patient has nontraumatic rhabdomyolysis. Query created by:  Momo Chan on 2021 10:42 AM      Electronically signed by:  Trupti Wilcox MD 2021 11:15 AM

## 2022-01-19 ENCOUNTER — PATIENT OUTREACH (OUTPATIENT)
Dept: CASE MANAGEMENT | Age: 32
End: 2022-01-19

## 2022-01-19 NOTE — PROGRESS NOTES
Follow Up Call    Challenges to be reviewed by the provider   Additional needs identified to be addressed with provider: no         Encounter was not routed to provider for escalation. Method of communication with provider: none. Contacted the patient by telephone to follow up after hospital visit. Status: improved continues with cough and sore throat but has returned to work    [address] follow up appointment(s): No future appointments. Follow up appointment completed? no.     Provided contact information for future needs. Plan for follow-up call in 7-10 days based on severity of symptoms and risk factors.   Plan for next call: symptom management-cough, sore throat     Minerva Huitron RN

## 2022-01-28 ENCOUNTER — PATIENT OUTREACH (OUTPATIENT)
Dept: CASE MANAGEMENT | Age: 32
End: 2022-01-28

## 2022-03-18 PROBLEM — M62.82 RHABDOMYOLYSIS: Status: ACTIVE | Noted: 2021-12-26

## 2022-03-19 PROBLEM — E87.6 HYPOKALEMIA: Status: ACTIVE | Noted: 2019-03-04

## 2022-03-19 PROBLEM — E66.01 SEVERE OBESITY (HCC): Status: ACTIVE | Noted: 2019-04-02

## 2022-05-03 ENCOUNTER — NURSE TRIAGE (OUTPATIENT)
Dept: OTHER | Facility: CLINIC | Age: 32
End: 2022-05-03

## 2022-05-03 NOTE — TELEPHONE ENCOUNTER
Received call from Gregor Dent at [facility] with Red Flag Complaint. Pt. Needs to establish care with PCP. Subjective: Caller states \"extreme fatigue\", after waking up after 6-7 hours of sleep,1 hour later is tired. No period 8 months and states not pregnant. Current Symptoms: Sleeping 10 hours a day,feeling tired,is able to go to work. Onset: 3 months    Associated Symptoms: NA    Pain Severity: Denies    Temperature: Denies    What has been tried:    LMP: 8 months ago     Pregnant: No Preg test neg 3 weeks ago. Recommended disposition: See PCP within 3 Days    Care advice provided, patient verbalizes understanding; denies any other questions or concerns; instructed to call back for any new or worsening symptoms. Patient/Caller agrees with recommended disposition; writer provided warm transfer to Erin at [facility] for appointment scheduling Advised [facility] if no appt. Available within next 3 days. Attention Provider: Thank you for allowing me to participate in the care of your patient. The patient was connected to triage in response to information provided to the [facility]. Please do not respond through this encounter as the response is not directed to a shared pool.     Reason for Disposition   Fatigue (i.e., tires easily, decreased energy) and persists > 1 week    Protocols used: WEAKNESS (GENERALIZED) AND FATIGUE-ADULT-OH

## 2022-05-05 ENCOUNTER — VIRTUAL VISIT (OUTPATIENT)
Dept: INTERNAL MEDICINE CLINIC | Age: 32
End: 2022-05-05
Payer: COMMERCIAL

## 2022-05-05 DIAGNOSIS — Z00.00 ENCOUNTER FOR MEDICAL EXAMINATION TO ESTABLISH CARE: Primary | ICD-10-CM

## 2022-05-05 DIAGNOSIS — Z86.73 HISTORY OF STROKE: ICD-10-CM

## 2022-05-05 DIAGNOSIS — Z87.39 HISTORY OF RHABDOMYOLYSIS: ICD-10-CM

## 2022-05-05 DIAGNOSIS — Z11.59 NEED FOR HEPATITIS C SCREENING TEST: ICD-10-CM

## 2022-05-05 DIAGNOSIS — N92.6 MISSED PERIOD: ICD-10-CM

## 2022-05-05 DIAGNOSIS — E55.9 VITAMIN D DEFICIENCY: ICD-10-CM

## 2022-05-05 PROCEDURE — 99203 OFFICE O/P NEW LOW 30 MIN: CPT | Performed by: INTERNAL MEDICINE

## 2022-05-05 NOTE — PROGRESS NOTES
Gamal Aj is a 28 y.o. female who was seen by synchronous (real-time) audio-video technology on 5/5/2022 for [address]:   Diagnoses and all orders for this visit:    1. Encounter for medical examination to establish care  -     METABOLIC PANEL, COMPREHENSIVE; Future  -     CBC WITH AUTOMATED DIFF; Future    2. History of stroke    3. History of rhabdomyolysis    4. Missed period  -     REFERRAL TO GYNECOLOGY  -     TSH 3RD GENERATION; Future  -     URINALYSIS W/MICROSCOPIC; Future  -     HCG QL SERUM; Future    5. BMI 40.0-44.9, adult ([facility] [address]  -     LIPID PANEL; Future  -     HEMOGLOBIN A1C WITH EAG; Future    6. Vitamin D deficiency  -     VITAMIN D, 25 HYDROXY; Future    7. Need for hepatitis C screening test  -     HEPATITIS C AB; Future      Follow-up and Dispositions    · Return in about 6 months (around 11/5/2022), or if symptoms worsen or fail to improve. Subjective:     Patient was seen via video to establish care. Last PCP was a few months ago. PMH of stroke rhabdomyolysis. Reports that she had a stroke back in 2012 due to the use of compazine. Reports that all her symptoms have resolved. Her last rhabdomyolysis flare was in 2019. Reports that she has missed her periods for now for the last 8 months. She also reports some fatigue. Has taken at home pregnancy test and all were negative. Last PAP was 2 years ago. Has been on [facility] in the past for irregular periods. Has not been on these for sometime now. Prior to Admission medications    Medication Sig Start Date End Date Taking? Authorizing Provider   cholecalciferol, VITAMIN D3, (VITAMIN D3) 5,000 unit tab tablet Take 1 Tab by mouth daily. Patient not taking: Reported on 5/5/2022 4/2/19   Meryle Ralph, MD   ondansetron (ZOFRAN ODT) 4 mg disintegrating tablet Take 1 Tab by mouth every six (6) hours as needed for Nausea.   Patient not taking: Reported on 5/5/2022 3/18/19   Maikel Bah NP   magnesium oxide (MAG-OX) 400 mg tablet Take 1 Tab by mouth daily. Patient not taking: Reported on 5/5/2022 3/18/19   Carolina Gonzalez NP   potassium chloride (KLOR-CON) 20 mEq pack Take 2 Packets by mouth daily. Patient not taking: Reported on 5/5/2022 3/18/19   JAYDEN Price, Please obtain BMP/Magnesium. Please call or fax results to number below. Pt will be seeing Dr. Choco Dyson on 4/2/2019    Dr. Choco Dyson  [phone]  Patient not taking: Reported on 5/5/2022 3/18/19 5/5/22  Carolina Gonzalez NP     Patient Active Problem List   Diagnosis Code    Traumatic rhabdomyolysis ([facility] [address] T79. 6XXA    Hypokalemia E87.6    Severe obesity ([facility]) E66.01    Rhabdomyolysis M62.82    Stroke [facility]) I63.9     Patient Active Problem List    Diagnosis Date Noted    Rhabdomyolysis 12/26/2021    Severe obesity ([facility] [address] 04/02/2019    Hypokalemia 03/04/2019    Traumatic rhabdomyolysis ([facility] [address] 05/12/2016    Stroke ([facility] [address] 05/05/2012     Current Outpatient Medications   Medication Sig Dispense Refill    cholecalciferol, VITAMIN D3, (VITAMIN D3) 5,000 unit tab tablet Take 1 Tab by mouth daily. (Patient not taking: Reported on 5/5/2022) 90 Tab 1    ondansetron (ZOFRAN ODT) 4 mg disintegrating tablet Take 1 Tab by mouth every six (6) hours as needed for Nausea. (Patient not taking: Reported on 5/5/2022) 30 Tab 1    magnesium oxide (MAG-OX) 400 mg tablet Take 1 Tab by mouth daily. (Patient not taking: Reported on 5/5/2022) 30 Tab 0    potassium chloride (KLOR-CON) 20 mEq pack Take 2 Packets by mouth daily.  (Patient not taking: Reported on 5/5/2022) 60 Packet 0     Allergies   Allergen Reactions    Compazine [Prochlorperazine Edisylate] Anaphylaxis     stroke    Midol [Acetaminophen-Pamabrom] Anaphylaxis     dont take again because of rabdo    Aspirin Other (comments)     Never take again because of rabdo    Compazine [Prochlorperazine Edisylate] Other (comments)     Stroke symptoms    Contrast Agent [Iodine] Other (comments)     Kidney problems    Ibuprofen Other (comments)     Kidney problem,rhabdo      Motrin [Ibuprofen] Nausea and Vomiting    Zofran [Ondansetron Hcl (Pf)] Nausea and Vomiting     Past Medical History:   Diagnosis Date    Rhabdomyolysis     Traumatic rhabdomyolysis ([facility] [address] 5/12/2016     History reviewed. No pertinent surgical history. Family History   Problem Relation Age of Onset    Diabetes Father     Diabetes Maternal Grandmother     Diabetes Maternal Grandfather     Cancer Paternal Grandmother         breast ca     Social History     Tobacco Use    Smoking status: Former Smoker    Smokeless tobacco: Never Used   Substance Use Topics    Alcohol use: No     Comment: rare       Review of Systems   Constitutional: Negative. Respiratory: Negative. Cardiovascular: Negative. Gastrointestinal: Negative for abdominal pain and nausea. Genitourinary:        Missed periods    Musculoskeletal: Negative. Neurological: Negative. Psychiatric/Behavioral: Negative. Objective:   No flowsheet data found.      [INSTRUCTIONS:  \"[x]\" Indicates a positive item  \"[]\" Indicates a negative item  -- DELETE ALL ITEMS NOT EXAMINED]    Constitutional: [x] Appears well-developed and well-nourished [x] No apparent distress      [] Abnormal -     Mental status: [x] Alert and awake  [x] Oriented to person/place/time [x] Able to follow commands    [] Abnormal -     Eyes:   EOM    [x]  Normal    [] Abnormal -   Sclera  [x]  Normal    [] Abnormal -          Discharge [x]  None visible   [] Abnormal -     HENT: [x] Normocephalic, atraumatic  [] Abnormal -   [x] Mouth/Throat: Mucous membranes are moist    External Ears [x] Normal  [] Abnormal -    Neck: [x] No visualized mass [] Abnormal -     Pulmonary/Chest: [x] Respiratory effort normal   [x] No visualized signs of difficulty breathing or respiratory distress        [] Abnormal -      Musculoskeletal:   [x] Normal gait with no signs of ataxia [x] Normal range of motion of neck        [] Abnormal -     Neurological:        [x] No Facial Asymmetry (Cranial nerve 7 motor function) (limited exam due to video visit)          [x] No gaze palsy        [] Abnormal -          Skin:        [x] No significant exanthematous lesions or discoloration noted on facial skin         [] Abnormal -            Psychiatric:       [x] Normal Affect [] Abnormal -        [x] No Hallucinations    Other pertinent observable physical exam findings:-        We discussed the expected course, resolution and complications of the diagnosis(es) in detail. Medication risks, benefits, costs, interactions, and alternatives were discussed as indicated. I advised her to contact the office if her condition worsens, changes or fails to improve as anticipated. She expressed understanding with the diagnosis(es) and plan. Yinka Betancur, was evaluated through a synchronous (real-time) audio-video encounter. The patient (or guardian if applicable) is aware that this is a billable service, which includes applicable co-pays. Verbal consent to proceed has been obtained. The visit was conducted pursuant to the emergency declaration under the [address] [place] [address] wa[facility] for Children authority and the [employer] and [employer] General Act. Patient identification was verified, and a caregiver was present when appropriate. The patient was located at home in a state where the provider was licensed to provide care.       Tessie Serrato NP

## 2022-05-17 ENCOUNTER — OFFICE VISIT (OUTPATIENT)
Dept: INTERNAL MEDICINE CLINIC | Age: 32
End: 2022-05-17
Payer: COMMERCIAL

## 2022-05-17 VITALS
HEIGHT: 64 IN | RESPIRATION RATE: 18 BRPM | WEIGHT: 252.6 LBS | DIASTOLIC BLOOD PRESSURE: 88 MMHG | BODY MASS INDEX: 43.13 KG/M2 | SYSTOLIC BLOOD PRESSURE: 122 MMHG | HEART RATE: 64 BPM | TEMPERATURE: 97.7 F

## 2022-05-17 DIAGNOSIS — E66.01 SEVERE OBESITY (HCC): ICD-10-CM

## 2022-05-17 DIAGNOSIS — N92.6 MISSED PERIOD: ICD-10-CM

## 2022-05-17 DIAGNOSIS — Z86.73 HISTORY OF STROKE: Primary | ICD-10-CM

## 2022-05-17 PROCEDURE — 99214 OFFICE O/P EST MOD 30 MIN: CPT | Performed by: INTERNAL MEDICINE

## 2022-05-17 NOTE — PROGRESS NOTES
HISTORY OF PRESENT ILLNESS  Lary Fountain is a 28 y.o. female. Patient was seen for an in person exam after establishing care. Reports that she continues with the fatigue, lower stomach pain and missed periods for the last several months. Did not get the labs done yet. Will still need to make an appointment with her GYN. PMH of a smoker. No stroke like activity. No residual weakness   Visit Vitals  /88 (BP 1 Location: Right upper arm, BP Patient Position: Sitting, BP Cuff Size: Large adult)   Pulse 64   Temp 97.7 °F (36.5 °C) (Temporal)   Resp 18   Ht 5' 4\" (1.626 m)   Wt 252 lb 9.6 oz (114.6 kg)   BMI 43.36 kg/m²     Past Medical History:   Diagnosis Date    Rhabdomyolysis     Traumatic rhabdomyolysis ([facility] [address] 5/12/2016     History reviewed. No pertinent surgical history. Family History   Problem Relation Age of Onset    Diabetes Father     Diabetes Maternal Grandmother     Diabetes Maternal Grandfather     Cancer Paternal Grandmother         breast ca     Outpatient Encounter Medications as of 5/17/2022   Medication Sig Dispense Refill    cholecalciferol, VITAMIN D3, (VITAMIN D3) 5,000 unit tab tablet Take 1 Tab by mouth daily. (Patient not taking: Reported on 5/5/2022) 90 Tab 1    ondansetron (ZOFRAN ODT) 4 mg disintegrating tablet Take 1 Tab by mouth every six (6) hours as needed for Nausea. (Patient not taking: Reported on 5/5/2022) 30 Tab 1    magnesium oxide (MAG-OX) 400 mg tablet Take 1 Tab by mouth daily. (Patient not taking: Reported on 5/5/2022) 30 Tab 0    potassium chloride (KLOR-CON) 20 mEq pack Take 2 Packets by mouth daily. (Patient not taking: Reported on 5/5/2022) 60 Packet 0     No facility-administered encounter medications on file as of 5/17/2022. HPI    Review of Systems   Constitutional: Negative. Respiratory: Negative. Cardiovascular: Negative. Gastrointestinal: Negative for constipation and vomiting. Lower pelvic pain    Genitourinary: Negative. Musculoskeletal: Negative. Neurological: Negative. Psychiatric/Behavioral: Negative. Physical Exam  Vitals and nursing note reviewed. Constitutional:       Appearance: She is obese. Cardiovascular:      Rate and Rhythm: Normal rate and regular rhythm. Pulmonary:      Effort: Pulmonary effort is normal.      Breath sounds: Normal breath sounds. Abdominal:      General: Bowel sounds are normal. There is no distension. Palpations: Abdomen is soft. Tenderness: There is no abdominal tenderness. Musculoskeletal:      Right lower leg: No edema. Left lower leg: No edema. Skin:     General: Skin is warm. Neurological:      Mental Status: She is alert and oriented to person, place, and time. Psychiatric:         Behavior: Behavior normal.         ASSESSMENT and PLAN  Diagnoses and all orders for this visit:    1. History of stroke    2. Missed period  - will need to get lab work done   -follow and make an appointment with GYN  3.  Severe obesity ([address]  - educated on weight loss and choices to make       Follow-up and Dispositions    · Return if symptoms worsen or fail to improve.       lab results and schedule of future lab studies reviewed with patient  reviewed diet, exercise and weight control  reviewed medications and side effects in detail

## 2022-05-17 NOTE — PROGRESS NOTES
Chief Complaint   Patient presents with    Labs     3 most recent [facility] Screens 5/17/2022   Little interest or pleasure in doing things Not at all   Feeling down, depressed, irritable, or hopeless Not at all   Total Score PHQ 2 0     Abuse Screening Questionnaire 5/17/2022   Do you ever feel afraid of your partner? N   Are you in a relationship with someone who physically or mentally threatens you? N   Is it safe for you to go home? Y     Visit Vitals  /88 (BP 1 Location: Right upper arm, BP Patient Position: Sitting, BP Cuff Size: Large adult)   Pulse 64   Temp 97.7 °F (36.5 °C) (Temporal)   Resp 18   Ht 5' 4\" (1.626 m)   Wt 252 lb 9.6 oz (114.6 kg)   BMI 43.36 kg/m²     1. \"Have you been to the ER, urgent care clinic since your last visit? Hospitalized since your last visit? \" no    2. \"Have you seen or consulted any other health care providers outside of the [address] [place] [address] since your last visit? \" no    3. For patients aged 39-70: Has the patient had a colonoscopy / FIT/ Cologuard? no      If the patient is female:    4. For patients aged 41-77: Has the patient had a mammogram within the past 2 years? no      5. For patients aged 21-65: Has the patient had a pap smear?  Uptodate

## 2022-05-18 LAB
25(OH)D3+25(OH)D2 SERPL-MCNC: 24.9 NG/ML (ref 30–100)
ALBUMIN SERPL-MCNC: 4.4 G/DL (ref 3.8–4.8)
ALBUMIN/GLOB SERPL: 1.4 {RATIO} (ref 1.2–2.2)
ALP SERPL-CCNC: 71 IU/L (ref 44–121)
ALT SERPL-CCNC: 17 IU/L (ref 0–32)
APPEARANCE UR: ABNORMAL
AST SERPL-CCNC: 11 IU/L (ref 0–40)
B-HCG SERPL QL: NEGATIVE MIU/ML
BACTERIA #/AREA URNS HPF: ABNORMAL /[HPF]
BASOPHILS # BLD AUTO: 0 X10E3/UL (ref 0–0.2)
BASOPHILS NFR BLD AUTO: 1 %
BILIRUB SERPL-MCNC: <0.2 MG/DL (ref 0–1.2)
BILIRUB UR QL STRIP: NEGATIVE
BUN SERPL-MCNC: 8 MG/DL (ref 6–20)
BUN/CREAT SERPL: 11 (ref 9–23)
CALCIUM SERPL-MCNC: 9.5 MG/DL (ref 8.7–10.2)
CASTS URNS QL MICRO: ABNORMAL /LPF
CHLORIDE SERPL-SCNC: 101 MMOL/L (ref 96–106)
CHOLEST SERPL-MCNC: 164 MG/DL (ref 100–199)
CO2 SERPL-SCNC: 21 MMOL/L (ref 20–29)
COLOR UR: YELLOW
CREAT SERPL-MCNC: 0.72 MG/DL (ref 0.57–1)
CRYSTALS URNS MICRO: ABNORMAL
EGFR: 114 ML/MIN/1.73
EOSINOPHIL # BLD AUTO: 0.1 X10E3/UL (ref 0–0.4)
EOSINOPHIL NFR BLD AUTO: 2 %
EPI CELLS #/AREA URNS HPF: >10 /HPF (ref 0–10)
ERYTHROCYTE [DISTWIDTH] IN BLOOD BY AUTOMATED COUNT: 14.1 % (ref 11.7–15.4)
EST. AVERAGE GLUCOSE BLD GHB EST-MCNC: 146 MG/DL
GLOBULIN SER CALC-MCNC: 3.2 G/DL (ref 1.5–4.5)
GLUCOSE SERPL-MCNC: 78 MG/DL (ref 65–99)
GLUCOSE UR QL STRIP: NEGATIVE
HBA1C MFR BLD: 6.7 % (ref 4.8–5.6)
HCT VFR BLD AUTO: 40 % (ref 34–46.6)
HCV AB S/CO SERPL IA: <0.1 S/CO RATIO (ref 0–0.9)
HDLC SERPL-MCNC: 41 MG/DL
HGB BLD-MCNC: 12.9 G/DL (ref 11.1–15.9)
HGB UR QL STRIP: NEGATIVE
IMM GRANULOCYTES # BLD AUTO: 0 X10E3/UL (ref 0–0.1)
IMM GRANULOCYTES NFR BLD AUTO: 0 %
IMP & REVIEW OF LAB RESULTS: NORMAL
KETONES UR QL STRIP: ABNORMAL
LDLC SERPL CALC-MCNC: 107 MG/DL (ref 0–99)
LEUKOCYTE ESTERASE UR QL STRIP: NEGATIVE
LYMPHOCYTES # BLD AUTO: 2.7 X10E3/UL (ref 0.7–3.1)
LYMPHOCYTES NFR BLD AUTO: 39 %
MCH RBC QN AUTO: 26.1 PG (ref 26.6–33)
MCHC RBC AUTO-ENTMCNC: 32.3 G/DL (ref 31.5–35.7)
MCV RBC AUTO: 81 FL (ref 79–97)
MICRO URNS: ABNORMAL
MICRO URNS: ABNORMAL
MONOCYTES # BLD AUTO: 0.5 X10E3/UL (ref 0.1–0.9)
MONOCYTES NFR BLD AUTO: 7 %
NEUTROPHILS # BLD AUTO: 3.7 X10E3/UL (ref 1.4–7)
NEUTROPHILS NFR BLD AUTO: 51 %
NITRITE UR QL STRIP: NEGATIVE
PH UR STRIP: 5.5 [PH] (ref 5–7.5)
PLATELET # BLD AUTO: 375 X10E3/UL (ref 150–450)
POTASSIUM SERPL-SCNC: 4.5 MMOL/L (ref 3.5–5.2)
PROT SERPL-MCNC: 7.6 G/DL (ref 6–8.5)
PROT UR QL STRIP: ABNORMAL
RBC # BLD AUTO: 4.95 X10E6/UL (ref 3.77–5.28)
RBC #/AREA URNS HPF: ABNORMAL /HPF (ref 0–2)
SODIUM SERPL-SCNC: 141 MMOL/L (ref 134–144)
SP GR UR STRIP: >=1.03 (ref 1–1.03)
TRIGL SERPL-MCNC: 86 MG/DL (ref 0–149)
TSH SERPL DL<=0.005 MIU/L-ACNC: 1.03 UIU/ML (ref 0.45–4.5)
UNIDENT CRYS URNS QL MICRO: PRESENT
UROBILINOGEN UR STRIP-MCNC: 1 MG/DL (ref 0.2–1)
VLDLC SERPL CALC-MCNC: 16 MG/DL (ref 5–40)
WBC # BLD AUTO: 7 X10E3/UL (ref 3.4–10.8)
WBC #/AREA URNS HPF: ABNORMAL /HPF (ref 0–5)

## 2022-05-18 NOTE — PROGRESS NOTES
Please call patient. She is a DM. Consider taking metformin daily     Urine is with bacteria and cloudy. Needs to increase water intake     LDL slightly high. Watch the processed foods, red meats and oils     Vitamin d low.  Take OTC daily

## 2022-05-19 ENCOUNTER — TELEPHONE (OUTPATIENT)
Dept: INTERNAL MEDICINE CLINIC | Age: 32
End: 2022-05-19

## 2022-05-19 DIAGNOSIS — E11.9 TYPE 2 DIABETES MELLITUS WITHOUT COMPLICATION, WITHOUT LONG-TERM CURRENT USE OF INSULIN (HCC): Primary | ICD-10-CM

## 2022-05-19 RX ORDER — METFORMIN HYDROCHLORIDE 500 MG/1
500 TABLET ORAL 2 TIMES DAILY WITH MEALS
Qty: 120 TABLET | Refills: 1 | Status: SHIPPED | OUTPATIENT
Start: 2022-05-19 | End: 2022-08-16

## 2022-05-19 NOTE — TELEPHONE ENCOUNTER
----- Message from Ranjana Vega NP sent at 5/18/2022  2:54 PM EDT -----  Please call patient. She is a DM. Consider taking metformin daily     Urine is with bacteria and cloudy. Needs to increase water intake     LDL slightly high. Watch the processed foods, red meats and oils     Vitamin d low.  Take OTC daily

## 2022-05-19 NOTE — TELEPHONE ENCOUNTER
Called and spoke with pt, discuss all lab results from 5/17, She is ok with starting metformin, will have provider to send in. Advised of all other recommendations from provider.

## 2022-05-19 NOTE — TELEPHONE ENCOUNTER
Returned call to pt and discussed lab results again with diet and exercise. Advised once medication has been snet in I will let her know. Will also mail out information for diet.      Will also send my chart message with information

## 2022-08-16 DIAGNOSIS — E11.9 TYPE 2 DIABETES MELLITUS WITHOUT COMPLICATION, WITHOUT LONG-TERM CURRENT USE OF INSULIN (HCC): ICD-10-CM

## 2022-08-16 RX ORDER — METFORMIN HYDROCHLORIDE 500 MG/1
TABLET ORAL
Qty: 180 TABLET | Refills: 1 | Status: SHIPPED | OUTPATIENT
Start: 2022-08-16 | End: 2022-09-20 | Stop reason: ALTCHOICE

## 2022-09-20 ENCOUNTER — VIRTUAL VISIT (OUTPATIENT)
Dept: INTERNAL MEDICINE CLINIC | Age: 32
End: 2022-09-20
Payer: COMMERCIAL

## 2022-09-20 DIAGNOSIS — E11.9 TYPE 2 DIABETES MELLITUS WITHOUT COMPLICATION, WITHOUT LONG-TERM CURRENT USE OF INSULIN (HCC): ICD-10-CM

## 2022-09-20 DIAGNOSIS — S89.92XA INJURY OF LEFT KNEE, INITIAL ENCOUNTER: Primary | ICD-10-CM

## 2022-09-20 DIAGNOSIS — Z86.73 HISTORY OF STROKE: ICD-10-CM

## 2022-09-20 DIAGNOSIS — E66.01 SEVERE OBESITY (HCC): ICD-10-CM

## 2022-09-20 PROCEDURE — 99213 OFFICE O/P EST LOW 20 MIN: CPT | Performed by: INTERNAL MEDICINE

## 2022-09-20 PROCEDURE — 3044F HG A1C LEVEL LT 7.0%: CPT | Performed by: INTERNAL MEDICINE

## 2022-09-20 RX ORDER — PREDNISONE 5 MG/1
TABLET ORAL
Qty: 1 DOSE PACK | Refills: 0 | Status: SHIPPED | OUTPATIENT
Start: 2022-09-20 | End: 2022-11-04 | Stop reason: ALTCHOICE

## 2022-09-20 NOTE — LETTER
NOTIFICATION RETURN TO WORK / SCHOOL    9/20/2022 1:57 PM    Ms. Yinka Betancur  [address] [place] [address]      To Whom It May Concern:    Yinka Betancur is currently under the care of [address]. She will return to work on 09/22/22. If there are questions or concerns please have the patient contact our office.         Sincerely,      Ranjana Barron MD

## 2022-09-20 NOTE — PROGRESS NOTES
Dee Vincent is a 28 y.o. female who was seen by synchronous (real-time) audio-video technology on 9/20/2022 for Knee Pain        Assessment & Plan:   Diagnoses and all orders for this visit:    1. Injury of left knee, initial encounter    She had a fall and injury to left knee. Knee is swollen and tender and painful to walk. Advised her to rest and ice pack and elevation for couple of days. She is not able to take NSAID since she had rhabdomyolysis couple of times. We will give,  -     predniSONE (STERAPRED) 5 mg dose pack; As directed for 6 days  Need to follow-up with PCP. 2. Type 2 diabetes mellitus without complication, without long-term current use of insulin (HCC)  A1c 6.7. Advised to be an ADA diet and exercise. 3. History of stroke  Doing well. 4. Severe obesity ([address]  Addressed weight, diet and exercise with patient. Decrease carbohydrates (white foods, sweet foods, sweet drinks and alcohol), increase green leafy vegetables and protein (lean meats and beans) with each meal. Avoid fried foods. Eat 3-5 small meals daily. Do not skip meals. Increase water intake. Increase physical activity to 30 minutes daily for health benefit or 60 minutes daily to prevent weight regain, as tolerated. Get 7-8 hours uninterrupted sleep nightly. I spent at least 22 minutes on this visit with this established patient. Subjective:   Ms. Katrina Mac is here for follow-up. Reported left knee pain and discomfort for last 10 days. She had a fall couple of weeks back when she slipped and fell and hit her left knee. Since then her knee is slightly swollen and tender. Her knee pain is progressively getting worse. She works full-time which is not helping her. She is diabetic, watching diet and exercise. Has history of stroke, doing well right now. Prior to Admission medications    Medication Sig Start Date End Date Taking?  Authorizing Provider   predniSONE (STERAPRED) 5 mg dose pack As directed for 6 days 9/20/22  Yes Nati Chen MD   metFORMIN (GLUCOPHAGE) 500 mg tablet TAKE 1 TABLET BY MOUTH TWICE A DAY WITH MEALS 8/16/22 9/20/22  Mp Lane NP   cholecalciferol, VITAMIN D3, (VITAMIN D3) 5,000 unit tab tablet Take 1 Tab by mouth daily. 4/2/19 9/20/22  Kiya Gilliam MD   ondansetron (ZOFRAN ODT) 4 mg disintegrating tablet Take 1 Tab by mouth every six (6) hours as needed for Nausea. 3/18/19 9/20/22  Lynnette Grayson NP   magnesium oxide (MAG-OX) 400 mg tablet Take 1 Tab by mouth daily. 3/18/19 9/20/22  Lynnette Grayson NP   potassium chloride (KLOR-CON) 20 mEq pack Take 2 Packets by mouth daily. 3/18/19 9/20/22  Lynnette Grayson NP     Past Medical History:   Diagnosis Date    Rhabdomyolysis     Traumatic rhabdomyolysis ([address] 5/12/2016       ROS significant for fall and left knee pain. Objective:   No flowsheet data found. Constitutional: [x] Appears well-developed and well-nourished [x] No apparent distress      [] Abnormal -     Mental status: [x] Alert and awake  [x] Oriented to person/place/time [x] Able to follow commands    [] Abnormal -       HENT: [x] Normocephalic, atraumatic  [] Abnormal -   [x] Mouth/Throat: Mucous membranes are moist    External Ears [x] Normal  [] Abnormal -    Neck: [x] No visualized mass [] Abnormal -     Pulmonary/Chest: [x] Respiratory effort normal   [x] No visualized signs of difficulty breathing or respiratory distress        [] Abnormal -      Musculoskeletal:   Left knee: Mildly swollen. Range of motion mildly restricted. Tenderness on palpation according to patient.     Neurological:        [x] No Facial Asymmetry (Cranial nerve 7 motor function) (limited exam due to video visit)          [x] No gaze palsy        [] Abnormal -          Skin:        [x] No significant exanthematous lesions or discoloration noted on facial skin         [] Abnormal -            Psychiatric:       [x] Normal Affect [] Abnormal -        [x] No Hallucinations    Other pertinent observable physical exam findings:-        We discussed the expected course, resolution and complications of the diagnosis(es) in detail. Medication risks, benefits, costs, interactions, and alternatives were discussed as indicated. I advised her to contact the office if her condition worsens, changes or fails to improve as anticipated. She expressed understanding with the diagnosis(es) and plan. Yinka Betancur, was evaluated through a synchronous (real-time) audio-video encounter. The patient (or guardian if applicable) is aware that this is a billable service, which includes applicable co-pays. This Virtual Visit was conducted with patient's (and/or legal guardian's) consent. The visit was conducted pursuant to the emergency declaration under the [address] [place] [address] authority and the [employer] and [employer] General Act. Patient identification was verified, and a caregiver was present when appropriate.   The patient was located at: Home: [address] [place] [address]  The provider was located at: Home: [unfilled]        Kirsty Farley MD

## 2022-11-04 ENCOUNTER — OFFICE VISIT (OUTPATIENT)
Dept: INTERNAL MEDICINE CLINIC | Age: 32
End: 2022-11-04
Payer: COMMERCIAL

## 2022-11-04 VITALS
BODY MASS INDEX: 41.11 KG/M2 | DIASTOLIC BLOOD PRESSURE: 82 MMHG | HEIGHT: 64 IN | WEIGHT: 240.8 LBS | SYSTOLIC BLOOD PRESSURE: 124 MMHG | RESPIRATION RATE: 12 BRPM | HEART RATE: 88 BPM | OXYGEN SATURATION: 97 %

## 2022-11-04 DIAGNOSIS — E78.2 MIXED HYPERLIPIDEMIA: ICD-10-CM

## 2022-11-04 DIAGNOSIS — E11.9 DIABETES MELLITUS TYPE 2, DIET-CONTROLLED (HCC): ICD-10-CM

## 2022-11-04 DIAGNOSIS — Z00.00 WELL ADULT EXAM: Primary | ICD-10-CM

## 2022-11-04 DIAGNOSIS — M25.562 PAIN IN BOTH KNEES, UNSPECIFIED CHRONICITY: ICD-10-CM

## 2022-11-04 DIAGNOSIS — M25.561 PAIN IN BOTH KNEES, UNSPECIFIED CHRONICITY: ICD-10-CM

## 2022-11-04 PROCEDURE — 99395 PREV VISIT EST AGE 18-39: CPT | Performed by: INTERNAL MEDICINE

## 2022-11-04 NOTE — PROGRESS NOTES
ADVISED PATIENT OF THE FOLLOWING HEALTH MAINTAINCE DUE  Health Maintenance Due   Topic Date Due    COVID-19 Vaccine (1) Never done    Pneumococcal 0-64 years (1 - PCV) Never done    DTaP/Tdap/Td series (1 - Tdap) Never done    Cervical cancer screen  Never done    Flu Vaccine (1) Never done      Chief Complaint   Patient presents with    Physical    Immunization/Injection     Talk about Tb testing. Pt is going into foster parenting. 1. \"Have you been to the ER, urgent care clinic since your last visit? Hospitalized since your last visit? \" No    2. \"Have you seen or consulted any other health care providers outside of the [address] [place] [address] since your last visit? \" No     3. For patients aged 39-70: Has the patient had a colonoscopy / FIT/ Cologuard? NA - based on age      If the patient is female:    4. For patients aged 41-77: Has the patient had a mammogram within the past 2 years? NA - based on age or sex      11. For patients aged 21-65: Has the patient had a pap smear? Yes - Care Gap present.  Rooming MA/LPN to request most recent results

## 2022-11-04 NOTE — LETTER
11/4/2022 2:07 PM    Ms. Ruthie Simpson        Dear Dr. Collin Ca,     Please fax us the most recent pap smear so that we may update the patient's records for continuity of care.      Our fax number: [phone]    Patient:   Cam Hoyos  1990                    Sincerely,      Hari Stewart NP

## 2022-11-04 NOTE — LETTER
NOTIFICATION RETURN TO WORK / SCHOOL    11/4/2022 1:57 PM    Ms. Yinka Betancur  [address] [place] [address]      To Whom It May Concern:    Yinka Betancur is currently under the care of [address]. Patient is not at risk for TB. Does not require TB screen or testing. If there are questions or concerns please have the patient contact our office.         Sincerely,      Carl Mack NP

## 2022-11-05 NOTE — PROGRESS NOTES
HISTORY OF PRESENT ILLNESS  Rafael Lindo is a 28 y.o. female. Patient was seen for a well exam.   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 Looking to do this soon. Needs a TB screen or release. Does not work with children. No travel or exposures. Denise Shack in her knees to a wooden floor a few months back. Knees still occassionally hurt. Did not apply ICE or take NSAIDs at the time. Some swelling on and off. No cracking. Last labs showed that she is a DM. Was not able to tolerate metformin. Visit Vitals  /82 (BP 1 Location: Left upper arm, BP Patient Position: Sitting, BP Cuff Size: Adult)   Pulse 88   Resp 12   Ht 5' 4\" (1.626 m)   Wt 240 lb 12.8 oz (109.2 kg)   LMP 10/16/2022 (Exact Date)   SpO2 97%   BMI 41.33 kg/m²     Past Medical History:   Diagnosis Date    Rhabdomyolysis     Traumatic rhabdomyolysis ([facility] [address] [facility] 5/12/2016     History reviewed. No pertinent surgical history. Family History   Problem Relation Age of Onset    Diabetes Father     Diabetes Maternal Grandmother     Diabetes Maternal Grandfather     Cancer Paternal Grandmother         breast ca     Outpatient Encounter Medications as of 11/4/2022   Medication Sig Dispense Refill    ergocalciferol, vitamin D2, (VITAMIN D2 PO) Take  by mouth.      prenatal vit/iron fum/folic ac (PRENATAL 1+1 PO) Take  by mouth. [DISCONTINUED] predniSONE (STERAPRED) 5 mg dose pack As directed for 6 days (Patient not taking: Reported on 11/4/2022) 1 Dose Pack 0     No facility-administered encounter medications on file as of 11/4/2022. HPI    Review of Systems   Constitutional: Negative. Respiratory: Negative. Cardiovascular: Negative. Gastrointestinal: Negative. Genitourinary: Negative. Musculoskeletal:  Positive for joint pain. Neurological: Negative. Psychiatric/Behavioral: Negative. Physical Exam  Vitals and nursing note reviewed. Constitutional:       Appearance: She is obese.    Eyes:      Pupils: Pupils are equal, round, and reactive to light. Cardiovascular:      Rate and Rhythm: Normal rate and regular rhythm. Pulmonary:      Effort: Pulmonary effort is normal.      Breath sounds: Normal breath sounds. Abdominal:      Palpations: Abdomen is soft. Musculoskeletal:      Cervical back: Neck supple. Right knee: No swelling. Normal range of motion. Normal meniscus. Left knee: No swelling or crepitus. Normal range of motion. Normal meniscus. Right lower leg: No edema. Left lower leg: No edema. Legs:    Skin:     General: Skin is warm. Neurological:      Mental Status: She is alert and oriented to person, place, and time. Psychiatric:         Behavior: Behavior normal.       ASSESSMENT and PLAN  Diagnoses and all orders for this visit:    1. Well adult exam    2. Mixed hyperlipidemia  -     LIPID PANEL; Future    3. Diabetes mellitus type 2, diet-controlled ([address]  -     HEMOGLOBIN A1C WITH EAG; Future        -     diabetic diet reviewed. Needs to work on daily intake with carb's in sugar intake    4.  Pain in both knees, unspecified chronicity        -     encouraged ICE intermittently, elevation and use of tylenol 650 mg every 6 hours      Follow-up and Dispositions    Return in about 6 months (around 5/4/2023), or if symptoms worsen or fail to improve.       lab results and schedule of future lab studies reviewed with patient  reviewed diet, exercise and weight control  reviewed medications and side effects in detail  specific diabetic recommendations: diabetic diet discussed in detail, written exchange diet given

## 2023-03-15 ENCOUNTER — OFFICE VISIT (OUTPATIENT)
Dept: INTERNAL MEDICINE CLINIC | Age: 33
End: 2023-03-15
Payer: COMMERCIAL

## 2023-03-15 VITALS
SYSTOLIC BLOOD PRESSURE: 128 MMHG | WEIGHT: 235 LBS | RESPIRATION RATE: 18 BRPM | BODY MASS INDEX: 40.12 KG/M2 | DIASTOLIC BLOOD PRESSURE: 88 MMHG | HEIGHT: 64 IN | TEMPERATURE: 97.8 F

## 2023-03-15 DIAGNOSIS — R20.2 NUMBNESS AND TINGLING OF LEFT LEG: Primary | ICD-10-CM

## 2023-03-15 DIAGNOSIS — R20.0 NUMBNESS AND TINGLING OF LEFT LEG: Primary | ICD-10-CM

## 2023-03-15 DIAGNOSIS — R20.9 COLD SENSATION OF SKIN: ICD-10-CM

## 2023-03-15 DIAGNOSIS — G47.9 SLEEPING DIFFICULTIES: ICD-10-CM

## 2023-03-15 PROCEDURE — 99213 OFFICE O/P EST LOW 20 MIN: CPT | Performed by: INTERNAL MEDICINE

## 2023-03-15 NOTE — PROGRESS NOTES
ADVISED PATIENT OF THE FOLLOWING HEALTH MAINTAINCE DUE  Health Maintenance Due   Topic Date Due    COVID-19 Vaccine (1) Never done    Pneumococcal 0-64 years (1 - PCV) Never done    Diabetic Alb to Cr ratio (uACR) test  Never done    Hepatitis B Vaccine (1 of 3 - Risk 3-dose series) Never done    DTaP/Tdap/Td series (1 - Tdap) Never done      Chief Complaint   Patient presents with    Leg Pain       1. \"Have you been to the ER, urgent care clinic since your last visit? Hospitalized since your last visit? \" No    2. \"Have you seen or consulted any other health care providers outside of the [address] [place] [address] since your last visit? \" No     3. For patients aged 39-70: Has the patient had a colonoscopy / FIT/ Cologuard? no      If the patient is female:    4. For patients aged 41-77: Has the patient had a mammogram within the past 2 years? no      5. For patients aged 21-65: Has the patient had a pap smear? Yes - Care Gap present.  Most recent result on file

## 2023-03-15 NOTE — PROGRESS NOTES
HISTORY OF PRESENT ILLNESS  Nicky Rubio is a 35 y.o. female. Patient was seen after the last few weeks she has had numbness, tingling and the sensation of coolness to the left leg. This did all began after falling to the knee a few months back. [facility] and [facility] was stable. Has some hypopigmentation to the lower knee. Occasional aches, but no calf pain. The numbness and tingling does radiate into the calf. No swelling, SOB or cough. No CP. Was on [facility] [employer] for 3 months a few years ago. Walking it out makes it better. Also notes that she is having some marital strain. Is not able to sleep well right now. Asking for medications to help. Has only tried tylenol PM.   Leg Pain   Associated symptoms include tingling. Visit Vitals  /88 (BP 1 Location: Right arm, BP Patient Position: Sitting, BP Cuff Size: Small adult)   Temp 97.8 °F (36.6 °C) (Oral)   Resp 18   Ht 5' 4\" (1.626 m)   Wt 235 lb (106.6 kg)   BMI 40.34 kg/m²     Past Medical History:   Diagnosis Date    Rhabdomyolysis     Traumatic rhabdomyolysis ([facility] [address] 5/12/2016     No past surgical history on file. Family History   Problem Relation Age of Onset    Diabetes Father     Diabetes Maternal Grandmother     Diabetes Maternal Grandfather     Cancer Paternal Grandmother         breast ca     Outpatient Encounter Medications as of 3/15/2023   Medication Sig Dispense Refill    ergocalciferol, vitamin D2, (VITAMIN D2 PO) Take  by mouth.      prenatal vit/iron fum/folic ac (PRENATAL 1+1 PO) Take  by mouth. No facility-administered encounter medications on file as of 3/15/2023. Review of Systems   Constitutional:  Negative for chills and fever. Respiratory:  Negative for cough and sputum production. Cardiovascular:  Negative for chest pain and palpitations. Gastrointestinal: Negative. Musculoskeletal: Negative. Neurological:  Positive for tingling and sensory change. Psychiatric/Behavioral:  The patient has insomnia. Physical Exam  Vitals and nursing note reviewed. Constitutional:       Appearance: She is obese. Cardiovascular:      Rate and Rhythm: Normal rate and regular rhythm. Pulses: Normal pulses. Pulmonary:      Effort: Pulmonary effort is normal.      Breath sounds: Normal breath sounds. Abdominal:      Palpations: Abdomen is soft. Musculoskeletal:         General: No swelling or tenderness. Right lower leg: No edema. Left lower leg: No edema. Skin:     General: Skin is warm. Neurological:      Mental Status: She is alert and oriented to person, place, and time. Psychiatric:         Mood and Affect: Mood normal.       ASSESSMENT and PLAN  Diagnoses and all orders for this visit:    1. Numbness and tingling of left leg  -     DUPLEX LOWER EXT VENOUS LEFT; Future        -     consider ORTHO for their opinion post fall and symptoms. 2. Cold sensation of skin  -     DUPLEX LOWER EXT VENOUS LEFT; Future    3.  Sleeping difficulties        -    suggested melatonin or Unisom first. Will consider trazodone if this does not help     Follow-up and Dispositions    Return if symptoms worsen or fail to improve.       lab results and schedule of future lab studies reviewed with patient  reviewed diet, exercise and weight control  reviewed medications and side effects in detail

## 2023-03-31 DIAGNOSIS — E11.9 DIABETES MELLITUS TYPE 2, DIET-CONTROLLED (HCC): ICD-10-CM

## 2023-03-31 DIAGNOSIS — E66.01 SEVERE OBESITY (HCC): ICD-10-CM

## 2023-03-31 DIAGNOSIS — E87.6 HYPOKALEMIA: ICD-10-CM

## 2023-03-31 DIAGNOSIS — E78.2 MIXED HYPERLIPIDEMIA: ICD-10-CM

## 2023-03-31 DIAGNOSIS — R20.0 NUMBNESS AND TINGLING OF LEFT LEG: ICD-10-CM

## 2023-03-31 DIAGNOSIS — R20.2 NUMBNESS AND TINGLING OF LEFT LEG: ICD-10-CM

## 2023-03-31 DIAGNOSIS — R20.9 COLD SENSATION OF SKIN: ICD-10-CM

## 2023-03-31 DIAGNOSIS — T79.6XXD TRAUMATIC RHABDOMYOLYSIS, SUBSEQUENT ENCOUNTER: ICD-10-CM

## 2023-03-31 DIAGNOSIS — I63.9 CEREBROVASCULAR ACCIDENT (CVA), UNSPECIFIED MECHANISM (HCC): Primary | ICD-10-CM

## 2024-04-05 ENCOUNTER — OFFICE VISIT (OUTPATIENT)
Age: 34
End: 2024-04-05
Payer: COMMERCIAL

## 2024-04-05 ENCOUNTER — HOSPITAL ENCOUNTER (OUTPATIENT)
Facility: HOSPITAL | Age: 34
Discharge: HOME | End: 2024-04-05
Payer: COMMERCIAL

## 2024-04-05 VITALS
TEMPERATURE: 98 F | BODY MASS INDEX: 41.48 KG/M2 | OXYGEN SATURATION: 98 % | SYSTOLIC BLOOD PRESSURE: 120 MMHG | RESPIRATION RATE: 17 BRPM | DIASTOLIC BLOOD PRESSURE: 78 MMHG | WEIGHT: 243 LBS | HEIGHT: 64 IN | HEART RATE: 88 BPM

## 2024-04-05 DIAGNOSIS — E11.69 TYPE 2 DIABETES MELLITUS WITH OTHER SPECIFIED COMPLICATION, WITHOUT LONG-TERM CURRENT USE OF INSULIN (HCC): ICD-10-CM

## 2024-04-05 DIAGNOSIS — K59.00 CONSTIPATION, UNSPECIFIED CONSTIPATION TYPE: ICD-10-CM

## 2024-04-05 DIAGNOSIS — R61 NIGHT SWEATS: ICD-10-CM

## 2024-04-05 DIAGNOSIS — E11.69 TYPE 2 DIABETES MELLITUS WITH OTHER SPECIFIED COMPLICATION, WITHOUT LONG-TERM CURRENT USE OF INSULIN (HCC): Primary | ICD-10-CM

## 2024-04-05 DIAGNOSIS — H66.92 LEFT OTITIS MEDIA, UNSPECIFIED OTITIS MEDIA TYPE: ICD-10-CM

## 2024-04-05 DIAGNOSIS — R19.4 CHANGE IN BOWEL HABITS: ICD-10-CM

## 2024-04-05 LAB — HBA1C MFR BLD: 5.9 %

## 2024-04-05 PROCEDURE — 74019 RADEX ABDOMEN 2 VIEWS: CPT

## 2024-04-05 PROCEDURE — 99214 OFFICE O/P EST MOD 30 MIN: CPT | Performed by: CLINICAL NURSE SPECIALIST

## 2024-04-05 PROCEDURE — 83036 HEMOGLOBIN GLYCOSYLATED A1C: CPT | Performed by: CLINICAL NURSE SPECIALIST

## 2024-04-05 RX ORDER — SEMAGLUTIDE 1.34 MG/ML
0.25 INJECTION, SOLUTION SUBCUTANEOUS WEEKLY
Qty: 3 ML | Refills: 0 | Status: SHIPPED | OUTPATIENT
Start: 2024-04-05 | End: 2024-05-05

## 2024-04-05 RX ORDER — POLYETHYLENE GLYCOL 3350 17 G/17G
17 POWDER, FOR SOLUTION ORAL DAILY PRN
Qty: 116 G | Refills: 1 | Status: SHIPPED | OUTPATIENT
Start: 2024-04-05 | End: 2024-04-05

## 2024-04-05 RX ORDER — AMOXICILLIN AND CLAVULANATE POTASSIUM 875; 125 MG/1; MG/1
1 TABLET, FILM COATED ORAL 2 TIMES DAILY
Qty: 20 TABLET | Refills: 0 | Status: SHIPPED | OUTPATIENT
Start: 2024-04-05 | End: 2024-04-15

## 2024-04-05 RX ORDER — POLYETHYLENE GLYCOL 3350 17 G/17G
17 POWDER, FOR SOLUTION ORAL DAILY PRN
Qty: 116 G | Refills: 1 | Status: SHIPPED | OUTPATIENT
Start: 2024-04-05 | End: 2024-05-05

## 2024-04-05 SDOH — ECONOMIC STABILITY: FOOD INSECURITY: WITHIN THE PAST 12 MONTHS, YOU WORRIED THAT YOUR FOOD WOULD RUN OUT BEFORE YOU GOT MONEY TO BUY MORE.: NEVER TRUE

## 2024-04-05 SDOH — ECONOMIC STABILITY: HOUSING INSECURITY
IN THE LAST 12 MONTHS, WAS THERE A TIME WHEN YOU DID NOT HAVE A STEADY PLACE TO SLEEP OR SLEPT IN A SHELTER (INCLUDING NOW)?: NO

## 2024-04-05 SDOH — ECONOMIC STABILITY: FOOD INSECURITY: WITHIN THE PAST 12 MONTHS, THE FOOD YOU BOUGHT JUST DIDN'T LAST AND YOU DIDN'T HAVE MONEY TO GET MORE.: NEVER TRUE

## 2024-04-05 SDOH — ECONOMIC STABILITY: INCOME INSECURITY: HOW HARD IS IT FOR YOU TO PAY FOR THE VERY BASICS LIKE FOOD, HOUSING, MEDICAL CARE, AND HEATING?: NOT HARD AT ALL

## 2024-04-05 ASSESSMENT — PATIENT HEALTH QUESTIONNAIRE - PHQ9
1. LITTLE INTEREST OR PLEASURE IN DOING THINGS: NOT AT ALL
SUM OF ALL RESPONSES TO PHQ QUESTIONS 1-9: 0
SUM OF ALL RESPONSES TO PHQ QUESTIONS 1-9: 0
SUM OF ALL RESPONSES TO PHQ9 QUESTIONS 1 & 2: 0
SUM OF ALL RESPONSES TO PHQ QUESTIONS 1-9: 0
2. FEELING DOWN, DEPRESSED OR HOPELESS: NOT AT ALL
SUM OF ALL RESPONSES TO PHQ QUESTIONS 1-9: 0

## 2024-04-05 NOTE — PROGRESS NOTES
Chief Complaint   Patient presents with    Follow-up Chronic Condition    Otalgia    Transient Ischemic Attack     \"Have you been to the ER, urgent care clinic since your last visit?  Hospitalized since your last visit?\"    NO    “Have you seen or consulted any other health care providers outside of [facility] since your last visit?”    NO            Click Here for Release of Records Request

## 2024-04-05 NOTE — PROGRESS NOTES
Alexandra Farah (:  1990) is a 34 y.o. female,Established patient, here for evaluation of the following chief complaint(s):  Follow-up Chronic Condition, Otalgia, and Transient Ischemic Attack         ASSESSMENT/PLAN:  1. Type 2 diabetes mellitus with other specified complication, without long-term current use of insulin (HCC)  -     AMB POC HEMOGLOBIN A1C      No follow-ups on file.         Subjective   SUBJECTIVE/OBJECTIVE:  HPI    Review of Systems       Objective   Physical Exam             An electronic signature was used to authenticate this note.    --FELIPE Banks - CNP

## 2024-04-09 ASSESSMENT — ENCOUNTER SYMPTOMS
BLOOD IN STOOL: 0
ABDOMINAL DISTENTION: 1
SHORTNESS OF BREATH: 0
CONSTIPATION: 1

## 2024-04-10 ENCOUNTER — TELEPHONE (OUTPATIENT)
Age: 34
End: 2024-04-10

## 2024-04-10 NOTE — TELEPHONE ENCOUNTER
Alexandra Farah was called and verbalized understanding on note below.     ----- Message from FELIPE Banks CNP sent at 4/9/2024  3:00 PM EDT -----  Abdominal xray shows stool. Has she tried prune juice and or miralax?

## 2024-04-24 DIAGNOSIS — H92.12 EAR DISCHARGE OF LEFT EAR: Primary | ICD-10-CM

## 2024-04-24 DIAGNOSIS — B37.2 SKIN YEAST INFECTION: ICD-10-CM

## 2024-04-24 RX ORDER — FLUCONAZOLE 150 MG/1
150 TABLET ORAL ONCE
Qty: 1 TABLET | Refills: 0 | Status: SHIPPED | OUTPATIENT
Start: 2024-04-24 | End: 2024-04-24

## 2024-05-07 ENCOUNTER — OFFICE VISIT (OUTPATIENT)
Age: 34
End: 2024-05-07
Payer: COMMERCIAL

## 2024-05-07 VITALS
RESPIRATION RATE: 17 BRPM | TEMPERATURE: 98 F | DIASTOLIC BLOOD PRESSURE: 65 MMHG | WEIGHT: 246.2 LBS | OXYGEN SATURATION: 98 % | SYSTOLIC BLOOD PRESSURE: 110 MMHG | BODY MASS INDEX: 42.03 KG/M2 | HEIGHT: 64 IN | HEART RATE: 87 BPM

## 2024-05-07 DIAGNOSIS — R22.0 NODULE OF SKIN OF HEAD: ICD-10-CM

## 2024-05-07 DIAGNOSIS — R51.9 OCCIPITAL PAIN: Primary | ICD-10-CM

## 2024-05-07 PROCEDURE — 99214 OFFICE O/P EST MOD 30 MIN: CPT | Performed by: INTERNAL MEDICINE

## 2024-05-07 RX ORDER — ACETAMINOPHEN AND CODEINE PHOSPHATE 300; 30 MG/1; MG/1
1 TABLET ORAL EVERY 8 HOURS PRN
Qty: 12 TABLET | Refills: 0 | Status: SHIPPED | OUTPATIENT
Start: 2024-05-07 | End: 2024-05-08 | Stop reason: RX

## 2024-05-07 NOTE — PROGRESS NOTES
Subjective    Alexandra Farah is a 34 y.o. female who presents today for the following:  Chief Complaint   Patient presents with    Mass    Stroke    Vaginal Discharge       History of Present Illness  The patient presents for evaluation of a bump on the back of her head.    The patient first observed a bump on the back of her head upon awakening on Saturday morning. The bump, which is tender and located beneath the skin, has not decreased in size. She denies any trauma to the head. The bump is not erythematous, and there is no tenderness surrounding the surrounding areas. This is a novel occurrence for her. She denies recent use of shampoo or conditioner, and her last hair washing session was the day after the discovery of the bump. The patient has a known history of psoriasis on her scalp and occasionally scratches the area, although it is not severe. She denies any systemic symptoms such as fevers, headaches, or dizziness. The bump has not exhibited any discharge or seepage. Despite attempts to manage the pain with Tylenol, heat, and ice, the pain persists. The patient reports difficulty sleeping due to the bump's proximity to the back of her head.   She is allergic to IBUPROFEN.        PMH/PSH/Allergies/Social History/medication list and most recent studies reviewed with patient.     reports that she has quit smoking. She has never used smokeless tobacco.    reports no history of alcohol use.   Results       Vitals:    05/07/24 0825   BP: 110/65   Pulse: 87   Resp: 17   Temp: 98 °F (36.7 °C)   SpO2: 98%     Body mass index is 42.26 kg/m².      5/7/2024     8:25 AM 4/5/2024    11:45 AM 3/15/2023     1:47 PM 11/4/2022    11:00 AM 5/17/2022     2:14 PM 12/26/2021     7:56 PM   Weight Metrics   Weight 246 lb 3.2 oz 243 lb 235 lb 240 lb 12.8 oz 252 lb 9.6 oz 242 lb   BMI (Calculated) 42.3 kg/m2 41.8 kg/m2 40.4 kg/m2 41.4 kg/m2 43.4 kg/m2 41.6 kg/m2       Past Medical History:   Diagnosis Date    Rhabdomyolysis

## 2024-05-07 NOTE — PROGRESS NOTES
Chief Complaint   Patient presents with    Mass    Stroke    Vaginal Discharge     \"Have you been to the ER, urgent care clinic since your last visit?  Hospitalized since your last visit?\"    NO    “Have you seen or consulted any other health care providers outside of [facility] since your last visit?”    NO            Click Here for Release of Records Request

## 2024-05-08 DIAGNOSIS — R51.9 OCCIPITAL PAIN: Primary | ICD-10-CM

## 2024-05-08 RX ORDER — TRAMADOL HYDROCHLORIDE 50 MG/1
50 TABLET ORAL EVERY 8 HOURS PRN
Qty: 8 TABLET | Refills: 0 | Status: SHIPPED | OUTPATIENT
Start: 2024-05-08 | End: 2024-05-11

## 2024-05-08 RX ORDER — NYSTATIN 100000 U/G
OINTMENT TOPICAL
Qty: 15 G | Refills: 0 | Status: SHIPPED | OUTPATIENT
Start: 2024-05-08

## 2024-08-19 DIAGNOSIS — J35.1 SWOLLEN TONSIL: Primary | ICD-10-CM

## (undated) DEVICE — NEEDLE HYPO 25GA L1.5IN BVL ORIENTED ECLIPSE

## (undated) DEVICE — DEVON™ KNEE AND BODY STRAP 60" X 3" (1.5 M X 7.6 CM): Brand: DEVON

## (undated) DEVICE — SUT VCRL 2-0 54IN UD --

## (undated) DEVICE — DRAPE,LAPAROTOMY,T,PEDI,STERILE: Brand: MEDLINE

## (undated) DEVICE — DBD-PACK,LAPAROTOMY,2 REINFORCED GOWNS: Brand: MEDLINE

## (undated) DEVICE — REM POLYHESIVE ADULT PATIENT RETURN ELECTRODE: Brand: VALLEYLAB

## (undated) DEVICE — (D)PREP SKN CHLRAPRP APPL 26ML -- CONVERT TO ITEM 371833

## (undated) DEVICE — SYR 10ML LUER LOK 1/5ML GRAD --

## (undated) DEVICE — 1200 GUARD II KIT W/5MM TUBE W/O VAC TUBE: Brand: GUARDIAN

## (undated) DEVICE — INFECTION CONTROL KIT SYS

## (undated) DEVICE — SUTURE VCRL SZ 3-0 L27IN ABSRB UD L26MM SH 1/2 CIR J416H

## (undated) DEVICE — GOWN,SIRUS,NONRNF,SETINSLV,2XL,18/CS: Brand: MEDLINE

## (undated) DEVICE — STERILE POLYISOPRENE POWDER-FREE SURGICAL GLOVES WITH EMOLLIENT COATING: Brand: PROTEXIS

## (undated) DEVICE — ROCKER SWITCH PENCIL BLADE ELECTRODE, HOLSTER: Brand: EDGE

## (undated) DEVICE — SUTURE MCRYL SZ 4-0 L27IN ABSRB UD L19MM PS-2 1/2 CIR PRIM Y426H

## (undated) DEVICE — TOWEL SURG W17XL27IN STD BLU COT NONFENESTRATED PREWASHED

## (undated) DEVICE — SURGICAL PROCEDURE PACK BASIN MAJ SET CUST NO CAUT

## (undated) DEVICE — APPLICATOR BNDG 1MM ADH PREMIERPRO EXOFIN

## (undated) DEVICE — INTENDED FOR TISSUE SEPARATION, AND OTHER PROCEDURES THAT REQUIRE A SHARP SURGICAL BLADE TO PUNCTURE OR CUT.: Brand: BARD-PARKER ® CARBON RIB-BACK BLADES

## (undated) DEVICE — SOLUTION IV 1000ML 0.9% SOD CHL

## (undated) DEVICE — PACK,BASIC,SIRUS,V: Brand: MEDLINE